# Patient Record
Sex: MALE | Race: WHITE | NOT HISPANIC OR LATINO | URBAN - METROPOLITAN AREA
[De-identification: names, ages, dates, MRNs, and addresses within clinical notes are randomized per-mention and may not be internally consistent; named-entity substitution may affect disease eponyms.]

---

## 2019-02-27 ENCOUNTER — HOSPITAL ENCOUNTER (OUTPATIENT)
Dept: MEDSURG UNIT | Facility: HOSPITAL | Age: 49
End: 2019-02-28
Attending: INTERNAL MEDICINE | Admitting: INTERNAL MEDICINE

## 2019-02-27 LAB
ABS NEUT (OLG): 8.96 X10(3)/MCL (ref 2.1–9.2)
ALBUMIN SERPL-MCNC: 3.9 GM/DL (ref 3.4–5)
ALBUMIN/GLOB SERPL: 0.9 RATIO (ref 1.1–2)
ALP SERPL-CCNC: 104 UNIT/L (ref 45–117)
ALT SERPL-CCNC: 97 UNIT/L (ref 12–78)
AMPHET UR QL SCN: NEGATIVE
APTT PPP: 27.1 SECOND(S) (ref 23.3–37)
AST SERPL-CCNC: 58 UNIT/L (ref 15–37)
BARBITURATE SCN PRESENT UR: NEGATIVE
BASOPHILS # BLD AUTO: 0.04 X10(3)/MCL
BASOPHILS NFR BLD AUTO: 0 %
BENZODIAZ UR QL SCN: NEGATIVE
BILIRUB SERPL-MCNC: 0.6 MG/DL (ref 0.2–1)
BILIRUBIN DIRECT+TOT PNL SERPL-MCNC: 0.2 MG/DL
BILIRUBIN DIRECT+TOT PNL SERPL-MCNC: 0.4 MG/DL
BUN SERPL-MCNC: 15 MG/DL (ref 7–18)
BUN SERPL-MCNC: 15 MG/DL (ref 7–18)
CALCIUM SERPL-MCNC: 8.9 MG/DL (ref 8.5–10.1)
CALCIUM SERPL-MCNC: 9 MG/DL (ref 8.5–10.1)
CANNABINOIDS UR QL SCN: NEGATIVE
CHLORIDE SERPL-SCNC: 100 MMOL/L (ref 98–107)
CHLORIDE SERPL-SCNC: 101 MMOL/L (ref 98–107)
CK MB SERPL-MCNC: 5.4 NG/ML (ref 1–3.6)
CK SERPL-CCNC: 180 UNIT/L (ref 39–308)
CO2 SERPL-SCNC: 28 MMOL/L (ref 21–32)
CO2 SERPL-SCNC: 31 MMOL/L (ref 21–32)
COCAINE UR QL SCN: NEGATIVE
CREAT SERPL-MCNC: 0.8 MG/DL (ref 0.6–1.3)
CREAT SERPL-MCNC: 0.8 MG/DL (ref 0.6–1.3)
CREAT/UREA NIT SERPL: 19
EOSINOPHIL # BLD AUTO: 0.07 X10(3)/MCL
EOSINOPHIL NFR BLD AUTO: 1 %
ERYTHROCYTE [DISTWIDTH] IN BLOOD BY AUTOMATED COUNT: 12.8 % (ref 11.5–14.5)
EST. AVERAGE GLUCOSE BLD GHB EST-MCNC: 180 MG/DL
GLOBULIN SER-MCNC: 4.5 GM/ML (ref 2.3–3.5)
GLUCOSE SERPL-MCNC: 220 MG/DL (ref 74–106)
GLUCOSE SERPL-MCNC: 246 MG/DL (ref 74–106)
HAV IGM SERPL QL IA: NONREACTIVE
HBA1C MFR BLD: 7.9 % (ref 4.2–6.3)
HBV CORE IGM SERPL QL IA: ABNORMAL
HBV SURFACE AG SERPL QL IA: NEGATIVE
HCT VFR BLD AUTO: 46.8 % (ref 40–51)
HCV AB SERPL QL IA: NONREACTIVE
HGB BLD-MCNC: 15.9 GM/DL (ref 13.5–17.5)
HIV 1+2 AB+HIV1 P24 AG SERPL QL IA: NONREACTIVE
IMM GRANULOCYTES # BLD AUTO: 0.07 10*3/UL
IMM GRANULOCYTES NFR BLD AUTO: 1 %
INR PPP: 0.97 (ref 0.9–1.2)
LYMPHOCYTES # BLD AUTO: 1.15 X10(3)/MCL
LYMPHOCYTES NFR BLD AUTO: 10 % (ref 13–40)
MAGNESIUM SERPL-MCNC: 2 MG/DL (ref 1.6–2.6)
MCH RBC QN AUTO: 31.7 PG (ref 26–34)
MCHC RBC AUTO-ENTMCNC: 34 GM/DL (ref 31–37)
MCV RBC AUTO: 93.2 FL (ref 80–100)
MONOCYTES # BLD AUTO: 0.79 X10(3)/MCL
MONOCYTES NFR BLD AUTO: 7 % (ref 4–12)
NEUTROPHILS # BLD AUTO: 8.96 X10(3)/MCL
NEUTROPHILS NFR BLD AUTO: 81 X10(3)/MCL
OPIATES UR QL SCN: NEGATIVE
PCP UR QL: NEGATIVE
PH UR STRIP.AUTO: 5 [PH] (ref 5–8)
PHOSPHATE SERPL-MCNC: 1.9 MG/DL (ref 2.5–4.9)
PHOSPHATE SERPL-MCNC: 2.7 MG/DL (ref 2.5–4.9)
PHOSPHATE SERPL-MCNC: 2.9 MG/DL (ref 2.5–4.9)
PLATELET # BLD AUTO: 245 X10(3)/MCL (ref 130–400)
PMV BLD AUTO: 10.8 FL (ref 7.4–10.4)
POC TROPONIN: 0.01 NG/ML (ref 0–0.08)
POTASSIUM SERPL-SCNC: 3.6 MMOL/L (ref 3.5–5.1)
POTASSIUM SERPL-SCNC: 3.8 MMOL/L (ref 3.5–5.1)
PROT SERPL-MCNC: 8.4 GM/DL (ref 6.4–8.2)
PROTHROMBIN TIME: 12.8 SECOND(S) (ref 11.9–14.4)
RBC # BLD AUTO: 5.02 X10(6)/MCL (ref 4.5–5.9)
SODIUM SERPL-SCNC: 136 MMOL/L (ref 136–145)
SODIUM SERPL-SCNC: 138 MMOL/L (ref 136–145)
TEMPERATURE, URINE (OHS): 25 DEGC (ref 20–25)
TROPONIN I SERPL-MCNC: 0.03 NG/ML (ref 0–0.05)
TSH SERPL-ACNC: 1.59 MIU/L (ref 0.36–3.74)
WBC # SPEC AUTO: 11.1 X10(3)/MCL (ref 4.5–11)

## 2019-02-28 LAB
ABS NEUT (OLG): 7.41 X10(3)/MCL (ref 2.1–9.2)
ALBUMIN SERPL-MCNC: 3.6 GM/DL (ref 3.4–5)
ALBUMIN/GLOB SERPL: 0.9 RATIO (ref 1.1–2)
ALP SERPL-CCNC: 89 UNIT/L (ref 45–117)
ALT SERPL-CCNC: 81 UNIT/L (ref 12–78)
AST SERPL-CCNC: 42 UNIT/L (ref 15–37)
BASOPHILS # BLD AUTO: 0.04 X10(3)/MCL
BASOPHILS NFR BLD AUTO: 0 %
BILIRUB SERPL-MCNC: 0.6 MG/DL (ref 0.2–1)
BILIRUBIN DIRECT+TOT PNL SERPL-MCNC: 0.2 MG/DL
BILIRUBIN DIRECT+TOT PNL SERPL-MCNC: 0.4 MG/DL
BUN SERPL-MCNC: 13 MG/DL (ref 7–18)
CALCIUM SERPL-MCNC: 8.7 MG/DL (ref 8.5–10.1)
CHLORIDE SERPL-SCNC: 104 MMOL/L (ref 98–107)
CHOLEST SERPL-MCNC: 258 MG/DL
CHOLEST/HDLC SERPL: 8.1 {RATIO} (ref 0–5)
CO2 SERPL-SCNC: 29 MMOL/L (ref 21–32)
CREAT SERPL-MCNC: 0.8 MG/DL (ref 0.6–1.3)
EOSINOPHIL # BLD AUTO: 0.13 10*3/UL
EOSINOPHIL NFR BLD AUTO: 1 %
ERYTHROCYTE [DISTWIDTH] IN BLOOD BY AUTOMATED COUNT: 12.7 % (ref 11.5–14.5)
GLOBULIN SER-MCNC: 3.9 GM/ML (ref 2.3–3.5)
GLUCOSE SERPL-MCNC: 159 MG/DL (ref 74–106)
HCT VFR BLD AUTO: 43.7 % (ref 40–51)
HDLC SERPL-MCNC: 32 MG/DL
HGB BLD-MCNC: 14.4 GM/DL (ref 13.5–17.5)
IMM GRANULOCYTES # BLD AUTO: 0.07 10*3/UL
IMM GRANULOCYTES NFR BLD AUTO: 1 %
LDLC SERPL CALC-MCNC: ABNORMAL MG/DL (ref 0–130)
LYMPHOCYTES # BLD AUTO: 2.17 X10(3)/MCL
LYMPHOCYTES NFR BLD AUTO: 20 % (ref 13–40)
MAGNESIUM SERPL-MCNC: 2.1 MG/DL (ref 1.6–2.6)
MCH RBC QN AUTO: 30.9 PG (ref 26–34)
MCHC RBC AUTO-ENTMCNC: 33 GM/DL (ref 31–37)
MCV RBC AUTO: 93.8 FL (ref 80–100)
MONOCYTES # BLD AUTO: 1.25 X10(3)/MCL
MONOCYTES NFR BLD AUTO: 11 % (ref 4–12)
NEUTROPHILS # BLD AUTO: 7.41 X10(3)/MCL
NEUTROPHILS NFR BLD AUTO: 67 X10(3)/MCL
PHOSPHATE SERPL-MCNC: 3.7 MG/DL (ref 2.5–4.9)
PLATELET # BLD AUTO: 255 X10(3)/MCL (ref 130–400)
PMV BLD AUTO: 11 FL (ref 7.4–10.4)
POTASSIUM SERPL-SCNC: 4 MMOL/L (ref 3.5–5.1)
PROT SERPL-MCNC: 7.5 GM/DL (ref 6.4–8.2)
RBC # BLD AUTO: 4.66 X10(6)/MCL (ref 4.5–5.9)
SODIUM SERPL-SCNC: 141 MMOL/L (ref 136–145)
TRIGL SERPL-MCNC: 522 MG/DL
TROPONIN I SERPL-MCNC: 0.02 NG/ML (ref 0–0.05)
VLDLC SERPL CALC-MCNC: ABNORMAL MG/DL
WBC # SPEC AUTO: 11.1 X10(3)/MCL (ref 4.5–11)

## 2019-06-04 ENCOUNTER — HISTORICAL (OUTPATIENT)
Dept: ADMINISTRATIVE | Facility: HOSPITAL | Age: 49
End: 2019-06-04

## 2019-06-04 LAB
ABS NEUT (OLG): 6.57 X10(3)/MCL (ref 2.1–9.2)
APTT PPP: 26.1 SECOND(S) (ref 23.3–37)
BASOPHILS # BLD AUTO: 0.05 X10(3)/MCL
BASOPHILS NFR BLD AUTO: 0 %
BUN SERPL-MCNC: 13 MG/DL (ref 7–18)
CALCIUM SERPL-MCNC: 8.7 MG/DL (ref 8.5–10.1)
CHLORIDE SERPL-SCNC: 102 MMOL/L (ref 98–107)
CO2 SERPL-SCNC: 31 MMOL/L (ref 21–32)
CREAT SERPL-MCNC: 0.8 MG/DL (ref 0.6–1.3)
CREAT/UREA NIT SERPL: 16
EOSINOPHIL # BLD AUTO: 0.36 X10(3)/MCL
EOSINOPHIL NFR BLD AUTO: 4 %
ERYTHROCYTE [DISTWIDTH] IN BLOOD BY AUTOMATED COUNT: 12.5 % (ref 11.5–14.5)
GLUCOSE SERPL-MCNC: 241 MG/DL (ref 74–106)
HCT VFR BLD AUTO: 42.2 % (ref 40–51)
HGB BLD-MCNC: 15 GM/DL (ref 13.5–17.5)
IMM GRANULOCYTES # BLD AUTO: 0.06 10*3/UL
IMM GRANULOCYTES NFR BLD AUTO: 1 %
INR PPP: 0.95 (ref 0.9–1.2)
LYMPHOCYTES # BLD AUTO: 1.7 X10(3)/MCL
LYMPHOCYTES NFR BLD AUTO: 18 % (ref 13–40)
MCH RBC QN AUTO: 33.2 PG (ref 26–34)
MCHC RBC AUTO-ENTMCNC: 35.5 GM/DL (ref 31–37)
MCV RBC AUTO: 93.4 FL (ref 80–100)
MONOCYTES # BLD AUTO: 0.84 X10(3)/MCL
MONOCYTES NFR BLD AUTO: 9 % (ref 4–12)
NEUTROPHILS # BLD AUTO: 6.57 X10(3)/MCL
NEUTROPHILS NFR BLD AUTO: 69 X10(3)/MCL
PLATELET # BLD AUTO: 227 X10(3)/MCL (ref 130–400)
PMV BLD AUTO: 10.7 FL (ref 7.4–10.4)
POTASSIUM SERPL-SCNC: 3.7 MMOL/L (ref 3.5–5.1)
PROTHROMBIN TIME: 12.6 SECOND(S) (ref 11.9–14.4)
RBC # BLD AUTO: 4.52 X10(6)/MCL (ref 4.5–5.9)
SODIUM SERPL-SCNC: 136 MMOL/L (ref 136–145)
WBC # SPEC AUTO: 9.6 X10(3)/MCL (ref 4.5–11)

## 2019-07-03 ENCOUNTER — HISTORICAL (OUTPATIENT)
Dept: CARDIOLOGY | Facility: HOSPITAL | Age: 49
End: 2019-07-03

## 2019-09-04 ENCOUNTER — HISTORICAL (OUTPATIENT)
Dept: ADMINISTRATIVE | Facility: HOSPITAL | Age: 49
End: 2019-09-04

## 2019-09-04 LAB
APTT PPP: 28.1 SECOND(S) (ref 23.3–37)
BUN SERPL-MCNC: 18 MG/DL (ref 7–18)
CALCIUM SERPL-MCNC: 8.7 MG/DL (ref 8.5–10.1)
CHLORIDE SERPL-SCNC: 98 MMOL/L (ref 98–107)
CO2 SERPL-SCNC: 27 MMOL/L (ref 21–32)
CREAT SERPL-MCNC: 0.8 MG/DL (ref 0.6–1.3)
CREAT/UREA NIT SERPL: 22
ERYTHROCYTE [DISTWIDTH] IN BLOOD BY AUTOMATED COUNT: 12.3 % (ref 11.5–14.5)
GLUCOSE SERPL-MCNC: 406 MG/DL (ref 74–106)
HCT VFR BLD AUTO: 45.5 % (ref 40–51)
HGB BLD-MCNC: 15.2 GM/DL (ref 13.5–17.5)
INR PPP: 0.94 (ref 0.9–1.2)
MCH RBC QN AUTO: 32 PG (ref 26–34)
MCHC RBC AUTO-ENTMCNC: 33.4 GM/DL (ref 31–37)
MCV RBC AUTO: 95.8 FL (ref 80–100)
PLATELET # BLD AUTO: 223 X10(3)/MCL (ref 130–400)
PMV BLD AUTO: 10.7 FL (ref 7.4–10.4)
POTASSIUM SERPL-SCNC: 4.1 MMOL/L (ref 3.5–5.1)
PROTHROMBIN TIME: 12.5 SECOND(S) (ref 11.9–14.4)
RBC # BLD AUTO: 4.75 X10(6)/MCL (ref 4.5–5.9)
SODIUM SERPL-SCNC: 134 MMOL/L (ref 136–145)
WBC # SPEC AUTO: 9.8 X10(3)/MCL (ref 4.5–11)

## 2019-09-25 ENCOUNTER — HOSPITAL ENCOUNTER (OUTPATIENT)
Dept: INTENSIVE CARE | Facility: HOSPITAL | Age: 49
End: 2019-09-26
Attending: INTERNAL MEDICINE | Admitting: PEDIATRICS

## 2019-09-25 LAB
ABS NEUT (OLG): 5.26 X10(3)/MCL (ref 2.1–9.2)
APTT PPP: 26.8 SECOND(S) (ref 23.3–37)
BASOPHILS # BLD AUTO: 0 X10(3)/MCL (ref 0–0.2)
BASOPHILS NFR BLD AUTO: 0 %
BUN SERPL-MCNC: 15 MG/DL (ref 7–18)
CALCIUM SERPL-MCNC: 8.4 MG/DL (ref 8.5–10.1)
CHLORIDE SERPL-SCNC: 101 MMOL/L (ref 98–107)
CHOLEST SERPL-MCNC: 202 MG/DL
CHOLEST/HDLC SERPL: 6.5 {RATIO} (ref 0–5)
CO2 SERPL-SCNC: 27 MMOL/L (ref 21–32)
CREAT SERPL-MCNC: 0.9 MG/DL (ref 0.6–1.3)
CREAT/UREA NIT SERPL: 17
EOSINOPHIL # BLD AUTO: 0.4 X10(3)/MCL (ref 0–0.9)
EOSINOPHIL NFR BLD AUTO: 4 %
ERYTHROCYTE [DISTWIDTH] IN BLOOD BY AUTOMATED COUNT: 12.5 % (ref 11.5–14.5)
GLUCOSE SERPL-MCNC: 303 MG/DL (ref 74–106)
HCT VFR BLD AUTO: 42 % (ref 40–51)
HDLC SERPL-MCNC: 31 MG/DL (ref 40–59)
HGB BLD-MCNC: 14.3 GM/DL (ref 13.5–17.5)
IMM GRANULOCYTES # BLD AUTO: 0.06 10*3/UL
IMM GRANULOCYTES NFR BLD AUTO: 1 %
INR PPP: 0.98 (ref 0.9–1.2)
LDLC SERPL CALC-MCNC: ABNORMAL MG/DL
LYMPHOCYTES # BLD AUTO: 1.6 X10(3)/MCL (ref 0.6–4.6)
LYMPHOCYTES NFR BLD AUTO: 19 %
MCH RBC QN AUTO: 32.4 PG (ref 26–34)
MCHC RBC AUTO-ENTMCNC: 34 GM/DL (ref 31–37)
MCV RBC AUTO: 95 FL (ref 80–100)
MONOCYTES # BLD AUTO: 0.9 X10(3)/MCL (ref 0.1–1.3)
MONOCYTES NFR BLD AUTO: 11 %
NEUTROPHILS # BLD AUTO: 5.26 X10(3)/MCL (ref 2.1–9.2)
NEUTROPHILS NFR BLD AUTO: 64 %
PLATELET # BLD AUTO: 208 X10(3)/MCL (ref 130–400)
PMV BLD AUTO: 10.6 FL (ref 7.4–10.4)
POTASSIUM SERPL-SCNC: 3.7 MMOL/L (ref 3.5–5.1)
PROTHROMBIN TIME: 12.9 SECOND(S) (ref 11.9–14.4)
RBC # BLD AUTO: 4.42 X10(6)/MCL (ref 4.5–5.9)
SODIUM SERPL-SCNC: 136 MMOL/L (ref 136–145)
TRIGL SERPL-MCNC: 898 MG/DL
VLDLC SERPL CALC-MCNC: ABNORMAL MG/DL
WBC # SPEC AUTO: 8.2 X10(3)/MCL (ref 4.5–11)

## 2019-09-26 LAB
ABS NEUT (OLG): 5.3 X10(3)/MCL (ref 2.1–9.2)
BASOPHILS # BLD AUTO: 0 X10(3)/MCL (ref 0–0.2)
BASOPHILS NFR BLD AUTO: 0 %
BUN SERPL-MCNC: 12 MG/DL (ref 7–18)
CALCIUM SERPL-MCNC: 8.3 MG/DL (ref 8.5–10.1)
CHLORIDE SERPL-SCNC: 102 MMOL/L (ref 98–107)
CO2 SERPL-SCNC: 29 MMOL/L (ref 21–32)
CREAT SERPL-MCNC: 0.7 MG/DL (ref 0.6–1.3)
CREAT/UREA NIT SERPL: 17
EOSINOPHIL # BLD AUTO: 0.3 X10(3)/MCL (ref 0–0.9)
EOSINOPHIL NFR BLD AUTO: 4 %
ERYTHROCYTE [DISTWIDTH] IN BLOOD BY AUTOMATED COUNT: 12.5 % (ref 11.5–14.5)
GLUCOSE SERPL-MCNC: 269 MG/DL (ref 74–106)
HCT VFR BLD AUTO: 42 % (ref 40–51)
HGB BLD-MCNC: 14.1 GM/DL (ref 13.5–17.5)
IMM GRANULOCYTES # BLD AUTO: 0.04 10*3/UL
IMM GRANULOCYTES NFR BLD AUTO: 0 %
LYMPHOCYTES # BLD AUTO: 2 X10(3)/MCL (ref 0.6–4.6)
LYMPHOCYTES NFR BLD AUTO: 23 %
MCH RBC QN AUTO: 32.1 PG (ref 26–34)
MCHC RBC AUTO-ENTMCNC: 33.6 GM/DL (ref 31–37)
MCV RBC AUTO: 95.7 FL (ref 80–100)
MONOCYTES # BLD AUTO: 0.9 X10(3)/MCL (ref 0.1–1.3)
MONOCYTES NFR BLD AUTO: 11 %
NEUTROPHILS # BLD AUTO: 5.3 X10(3)/MCL (ref 2.1–9.2)
NEUTROPHILS NFR BLD AUTO: 62 %
PLATELET # BLD AUTO: 213 X10(3)/MCL (ref 130–400)
PMV BLD AUTO: 10.5 FL (ref 7.4–10.4)
POTASSIUM SERPL-SCNC: 3.9 MMOL/L (ref 3.5–5.1)
RBC # BLD AUTO: 4.39 X10(6)/MCL (ref 4.5–5.9)
SODIUM SERPL-SCNC: 138 MMOL/L (ref 136–145)
TROPONIN I SERPL-MCNC: 1.42 NG/ML (ref 0–0.05)
WBC # SPEC AUTO: 8.6 X10(3)/MCL (ref 4.5–11)

## 2019-10-31 ENCOUNTER — HISTORICAL (OUTPATIENT)
Dept: RADIOLOGY | Facility: HOSPITAL | Age: 49
End: 2019-10-31

## 2020-01-17 ENCOUNTER — HISTORICAL (OUTPATIENT)
Dept: RADIOLOGY | Facility: HOSPITAL | Age: 50
End: 2020-01-17

## 2020-01-24 ENCOUNTER — HISTORICAL (OUTPATIENT)
Dept: ADMINISTRATIVE | Facility: HOSPITAL | Age: 50
End: 2020-01-24

## 2020-01-24 LAB
ABS NEUT (OLG): 4.96 X10(3)/MCL (ref 2.1–9.2)
ALBUMIN SERPL-MCNC: 3.8 GM/DL (ref 3.4–5)
ALBUMIN/GLOB SERPL: 0.9 RATIO (ref 1.1–2)
ALP SERPL-CCNC: 101 UNIT/L (ref 45–117)
ALT SERPL-CCNC: 68 UNIT/L (ref 12–78)
APPEARANCE, UA: CLEAR
AST SERPL-CCNC: 27 UNIT/L (ref 15–37)
BACTERIA #/AREA URNS AUTO: ABNORMAL /HPF
BASOPHILS # BLD AUTO: 0 X10(3)/MCL (ref 0–0.2)
BASOPHILS NFR BLD AUTO: 0 %
BILIRUB SERPL-MCNC: 0.7 MG/DL (ref 0.2–1)
BILIRUB UR QL STRIP: NEGATIVE
BILIRUBIN DIRECT+TOT PNL SERPL-MCNC: 0.2 MG/DL (ref 0–0.2)
BILIRUBIN DIRECT+TOT PNL SERPL-MCNC: 0.5 MG/DL
BUN SERPL-MCNC: 17 MG/DL (ref 7–18)
CALCIUM SERPL-MCNC: 9.1 MG/DL (ref 8.5–10.1)
CHLORIDE SERPL-SCNC: 98 MMOL/L (ref 98–107)
CHOLEST SERPL-MCNC: 188 MG/DL
CHOLEST/HDLC SERPL: 5.4 {RATIO} (ref 0–5)
CO2 SERPL-SCNC: 32 MMOL/L (ref 21–32)
COLOR UR: ABNORMAL
CREAT SERPL-MCNC: 0.8 MG/DL (ref 0.6–1.3)
CREAT UR-MCNC: 27 MG/DL
EOSINOPHIL # BLD AUTO: 0.4 X10(3)/MCL (ref 0–0.9)
EOSINOPHIL NFR BLD AUTO: 5 %
ERYTHROCYTE [DISTWIDTH] IN BLOOD BY AUTOMATED COUNT: 12.7 % (ref 11.5–14.5)
GLOBULIN SER-MCNC: 4.1 GM/ML (ref 2.3–3.5)
GLUCOSE (UA): 200 MG/DL
GLUCOSE SERPL-MCNC: 272 MG/DL (ref 74–106)
HCT VFR BLD AUTO: 45.9 % (ref 40–51)
HDLC SERPL-MCNC: 35 MG/DL (ref 40–59)
HGB BLD-MCNC: 15.4 GM/DL (ref 13.5–17.5)
HGB UR QL STRIP: NEGATIVE
HYALINE CASTS #/AREA URNS LPF: ABNORMAL /LPF
IMM GRANULOCYTES # BLD AUTO: 0.04 10*3/UL
IMM GRANULOCYTES NFR BLD AUTO: 0 %
KETONES UR QL STRIP: NEGATIVE
LDLC SERPL CALC-MCNC: ABNORMAL MG/DL
LEUKOCYTE ESTERASE UR QL STRIP: 75 LEU/UL
LYMPHOCYTES # BLD AUTO: 1.5 X10(3)/MCL (ref 0.6–4.6)
LYMPHOCYTES NFR BLD AUTO: 20 %
MCH RBC QN AUTO: 31.2 PG (ref 26–34)
MCHC RBC AUTO-ENTMCNC: 33.6 GM/DL (ref 31–37)
MCV RBC AUTO: 93.1 FL (ref 80–100)
MICROALBUMIN UR-MCNC: 6.1 MG/L (ref 0–19)
MICROALBUMIN/CREAT RATIO PNL UR: 22.6 MCG/MG CR (ref 0–29)
MONOCYTES # BLD AUTO: 0.8 X10(3)/MCL (ref 0.1–1.3)
MONOCYTES NFR BLD AUTO: 11 %
NEUTROPHILS # BLD AUTO: 4.96 X10(3)/MCL (ref 2.1–9.2)
NEUTROPHILS NFR BLD AUTO: 64 %
NITRITE UR QL STRIP: NEGATIVE
PH UR STRIP: 5 [PH] (ref 4.5–8)
PLATELET # BLD AUTO: 223 X10(3)/MCL (ref 130–400)
PMV BLD AUTO: 10.4 FL (ref 7.4–10.4)
POTASSIUM SERPL-SCNC: 3.9 MMOL/L (ref 3.5–5.1)
PROT SERPL-MCNC: 7.9 GM/DL (ref 6.4–8.2)
PROT UR QL STRIP: NEGATIVE
PSA SERPL-MCNC: 0.1 NG/ML
RBC # BLD AUTO: 4.93 X10(6)/MCL (ref 4.5–5.9)
RBC #/AREA URNS AUTO: ABNORMAL /HPF
SODIUM SERPL-SCNC: 135 MMOL/L (ref 136–145)
SP GR UR STRIP: 1.01 (ref 1–1.03)
SQUAMOUS #/AREA URNS LPF: ABNORMAL /LPF
TRIGL SERPL-MCNC: 627 MG/DL
TSH SERPL-ACNC: 1.19 MIU/L (ref 0.36–3.74)
UROBILINOGEN UR STRIP-ACNC: NORMAL
VLDLC SERPL CALC-MCNC: ABNORMAL MG/DL
WBC # SPEC AUTO: 7.8 X10(3)/MCL (ref 4.5–11)
WBC #/AREA URNS AUTO: ABNORMAL /HPF

## 2020-03-12 ENCOUNTER — HISTORICAL (OUTPATIENT)
Dept: INTERNAL MEDICINE | Facility: CLINIC | Age: 50
End: 2020-03-12

## 2020-03-12 LAB
EST. AVERAGE GLUCOSE BLD GHB EST-MCNC: 163 MG/DL
HBA1C MFR BLD: 7.3 % (ref 4.2–6.3)

## 2020-05-19 ENCOUNTER — HISTORICAL (OUTPATIENT)
Dept: INTERNAL MEDICINE | Facility: CLINIC | Age: 50
End: 2020-05-19

## 2020-05-19 LAB
APPEARANCE, UA: CLEAR
BACTERIA #/AREA URNS AUTO: ABNORMAL /HPF
BILIRUB UR QL STRIP: NEGATIVE
BUN SERPL-MCNC: 19 MG/DL (ref 7–18)
CALCIUM SERPL-MCNC: 9.6 MG/DL (ref 8.5–10.1)
CHLORIDE SERPL-SCNC: 104 MMOL/L (ref 98–107)
CHOLEST SERPL-MCNC: 163 MG/DL
CHOLEST/HDLC SERPL: 3.7 {RATIO} (ref 0–5)
CO2 SERPL-SCNC: 31 MMOL/L (ref 21–32)
COLOR UR: NORMAL
CREAT SERPL-MCNC: 1.2 MG/DL (ref 0.6–1.3)
CREAT/UREA NIT SERPL: 16
EST. AVERAGE GLUCOSE BLD GHB EST-MCNC: 157 MG/DL
GLUCOSE (UA): NEGATIVE
GLUCOSE SERPL-MCNC: 186 MG/DL (ref 74–106)
HBA1C MFR BLD: 7.1 % (ref 4.2–6.3)
HDLC SERPL-MCNC: 44 MG/DL (ref 40–59)
HGB UR QL STRIP: NEGATIVE
HYALINE CASTS #/AREA URNS LPF: ABNORMAL /LPF
KETONES UR QL STRIP: NEGATIVE
LDLC SERPL CALC-MCNC: 88 MG/DL
LEUKOCYTE ESTERASE UR QL STRIP: NEGATIVE
NITRITE UR QL STRIP: NEGATIVE
PH UR STRIP: 5 [PH] (ref 4.5–8)
POTASSIUM SERPL-SCNC: 3.8 MMOL/L (ref 3.5–5.1)
PROT UR QL STRIP: NEGATIVE
RBC #/AREA URNS AUTO: ABNORMAL /HPF
SODIUM SERPL-SCNC: 140 MMOL/L (ref 136–145)
SP GR UR STRIP: 1.01 (ref 1–1.03)
SQUAMOUS #/AREA URNS LPF: ABNORMAL /LPF
TRIGL SERPL-MCNC: 154 MG/DL
UROBILINOGEN UR STRIP-ACNC: NORMAL
VLDLC SERPL CALC-MCNC: 31 MG/DL
WBC #/AREA URNS AUTO: ABNORMAL /HPF

## 2020-08-24 ENCOUNTER — HISTORICAL (OUTPATIENT)
Dept: ADMINISTRATIVE | Facility: HOSPITAL | Age: 50
End: 2020-08-24

## 2020-08-24 LAB
ABS NEUT (OLG): 5.86 X10(3)/MCL (ref 2.1–9.2)
ALBUMIN SERPL-MCNC: 3.8 GM/DL (ref 3.4–5)
ALBUMIN/GLOB SERPL: 1 RATIO (ref 1.1–2)
ALP SERPL-CCNC: 68 UNIT/L (ref 45–117)
ALT SERPL-CCNC: 45 UNIT/L (ref 12–78)
AST SERPL-CCNC: 27 UNIT/L (ref 15–37)
BASOPHILS # BLD AUTO: 0 X10(3)/MCL (ref 0–0.2)
BASOPHILS NFR BLD AUTO: 1 %
BILIRUB SERPL-MCNC: 0.3 MG/DL (ref 0.2–1)
BILIRUBIN DIRECT+TOT PNL SERPL-MCNC: 0.1 MG/DL (ref 0–0.2)
BILIRUBIN DIRECT+TOT PNL SERPL-MCNC: 0.2 MG/DL
BUN SERPL-MCNC: 14 MG/DL (ref 7–18)
CALCIUM SERPL-MCNC: 9.2 MG/DL (ref 8.5–10.1)
CHLORIDE SERPL-SCNC: 104 MMOL/L (ref 98–107)
CO2 SERPL-SCNC: 26 MMOL/L (ref 21–32)
CREAT SERPL-MCNC: 1.1 MG/DL (ref 0.6–1.3)
EOSINOPHIL # BLD AUTO: 0.3 X10(3)/MCL (ref 0–0.9)
EOSINOPHIL NFR BLD AUTO: 4 %
ERYTHROCYTE [DISTWIDTH] IN BLOOD BY AUTOMATED COUNT: 12.2 % (ref 11.5–14.5)
GLOBULIN SER-MCNC: 3.9 GM/ML (ref 2.3–3.5)
GLUCOSE SERPL-MCNC: 386 MG/DL (ref 74–106)
HCT VFR BLD AUTO: 39.4 % (ref 40–51)
HGB BLD-MCNC: 13.5 GM/DL (ref 13.5–17.5)
IMM GRANULOCYTES # BLD AUTO: 0.06 10*3/UL
IMM GRANULOCYTES NFR BLD AUTO: 1 %
LYMPHOCYTES # BLD AUTO: 1.4 X10(3)/MCL (ref 0.6–4.6)
LYMPHOCYTES NFR BLD AUTO: 16 %
MCH RBC QN AUTO: 32 PG (ref 26–34)
MCHC RBC AUTO-ENTMCNC: 34.3 GM/DL (ref 31–37)
MCV RBC AUTO: 93.4 FL (ref 80–100)
MONOCYTES # BLD AUTO: 0.7 X10(3)/MCL (ref 0.1–1.3)
MONOCYTES NFR BLD AUTO: 8 %
NEUTROPHILS # BLD AUTO: 5.86 X10(3)/MCL (ref 2.1–9.2)
NEUTROPHILS NFR BLD AUTO: 70 %
PLATELET # BLD AUTO: 274 X10(3)/MCL (ref 130–400)
PMV BLD AUTO: 10.7 FL (ref 7.4–10.4)
POTASSIUM SERPL-SCNC: 4.1 MMOL/L (ref 3.5–5.1)
PROT SERPL-MCNC: 7.7 GM/DL (ref 6.4–8.2)
RBC # BLD AUTO: 4.22 X10(6)/MCL (ref 4.5–5.9)
SODIUM SERPL-SCNC: 138 MMOL/L (ref 136–145)
WBC # SPEC AUTO: 8.3 X10(3)/MCL (ref 4.5–11)

## 2020-11-17 ENCOUNTER — HISTORICAL (OUTPATIENT)
Dept: INTERNAL MEDICINE | Facility: CLINIC | Age: 50
End: 2020-11-17

## 2020-11-17 LAB
ALBUMIN SERPL-MCNC: 3.9 GM/DL (ref 3.5–5)
ALBUMIN/GLOB SERPL: 1.2 RATIO (ref 1.1–2)
ALP SERPL-CCNC: 51 UNIT/L (ref 40–150)
ALT SERPL-CCNC: 32 UNIT/L (ref 0–55)
APPEARANCE, UA: CLEAR
AST SERPL-CCNC: 23 UNIT/L (ref 5–34)
BACTERIA #/AREA URNS AUTO: ABNORMAL /HPF
BILIRUB SERPL-MCNC: 0.4 MG/DL
BILIRUB UR QL STRIP: NEGATIVE
BILIRUBIN DIRECT+TOT PNL SERPL-MCNC: 0.1 MG/DL (ref 0–0.8)
BILIRUBIN DIRECT+TOT PNL SERPL-MCNC: 0.3 MG/DL (ref 0–0.5)
BUN SERPL-MCNC: 15 MG/DL (ref 8.4–25.7)
CALCIUM SERPL-MCNC: 8.9 MG/DL (ref 8.4–10.2)
CHLORIDE SERPL-SCNC: 106 MMOL/L (ref 98–107)
CHOLEST SERPL-MCNC: 151 MG/DL
CHOLEST/HDLC SERPL: 4 {RATIO} (ref 0–5)
CO2 SERPL-SCNC: 26 MMOL/L (ref 22–29)
COLOR UR: NORMAL
CREAT SERPL-MCNC: 0.75 MG/DL (ref 0.73–1.18)
EST. AVERAGE GLUCOSE BLD GHB EST-MCNC: 145.6 MG/DL
GLOBULIN SER-MCNC: 3.3 GM/DL (ref 2.4–3.5)
GLUCOSE (UA): NEGATIVE
GLUCOSE SERPL-MCNC: 146 MG/DL (ref 74–100)
HBA1C MFR BLD: 6.7 %
HDLC SERPL-MCNC: 36 MG/DL (ref 35–60)
HGB UR QL STRIP: NEGATIVE
HYALINE CASTS #/AREA URNS LPF: ABNORMAL /LPF
KETONES UR QL STRIP: NEGATIVE
LDLC SERPL CALC-MCNC: 89 MG/DL (ref 50–140)
LEUKOCYTE ESTERASE UR QL STRIP: NEGATIVE
NITRITE UR QL STRIP: NEGATIVE
PH UR STRIP: 5.5 [PH] (ref 4.5–8)
POTASSIUM SERPL-SCNC: 3.8 MMOL/L (ref 3.5–5.1)
PROT SERPL-MCNC: 7.2 GM/DL (ref 6.4–8.3)
PROT UR QL STRIP: NEGATIVE
RBC #/AREA URNS AUTO: ABNORMAL /HPF
SODIUM SERPL-SCNC: 138 MMOL/L (ref 136–145)
SP GR UR STRIP: 1.02 (ref 1–1.03)
SQUAMOUS #/AREA URNS LPF: ABNORMAL /LPF
TRIGL SERPL-MCNC: 131 MG/DL (ref 34–140)
UROBILINOGEN UR STRIP-ACNC: NORMAL
VLDLC SERPL CALC-MCNC: 26 MG/DL
WBC #/AREA URNS AUTO: ABNORMAL /HPF

## 2020-12-07 ENCOUNTER — HISTORICAL (OUTPATIENT)
Dept: RADIOLOGY | Facility: HOSPITAL | Age: 50
End: 2020-12-07

## 2020-12-07 LAB
BUN SERPL-MCNC: 16 MG/DL (ref 8.4–25.7)
CALCIUM SERPL-MCNC: 9.3 MG/DL (ref 8.4–10.2)
CHLORIDE SERPL-SCNC: 107 MMOL/L (ref 98–107)
CO2 SERPL-SCNC: 25 MMOL/L (ref 22–29)
CREAT SERPL-MCNC: 0.9 MG/DL (ref 0.73–1.18)
CREAT/UREA NIT SERPL: 18
EST. AVERAGE GLUCOSE BLD GHB EST-MCNC: 137 MG/DL
GLUCOSE SERPL-MCNC: 153 MG/DL (ref 74–100)
HBA1C MFR BLD: 6.4 %
POTASSIUM SERPL-SCNC: 4.1 MMOL/L (ref 3.5–5.1)
SODIUM SERPL-SCNC: 140 MMOL/L (ref 136–145)

## 2021-02-04 ENCOUNTER — HISTORICAL (OUTPATIENT)
Dept: CARDIOLOGY | Facility: CLINIC | Age: 51
End: 2021-02-04

## 2021-02-04 LAB
BUN SERPL-MCNC: 15 MG/DL (ref 8.4–25.7)
CALCIUM SERPL-MCNC: 9 MG/DL (ref 8.4–10.2)
CHLORIDE SERPL-SCNC: 105 MMOL/L (ref 98–107)
CO2 SERPL-SCNC: 25 MMOL/L (ref 22–29)
CREAT SERPL-MCNC: 0.84 MG/DL (ref 0.73–1.18)
CREAT/UREA NIT SERPL: 18
GLUCOSE SERPL-MCNC: 188 MG/DL (ref 74–100)
POTASSIUM SERPL-SCNC: 4 MMOL/L (ref 3.5–5.1)
SODIUM SERPL-SCNC: 139 MMOL/L (ref 136–145)

## 2021-03-09 ENCOUNTER — HISTORICAL (OUTPATIENT)
Dept: INTERNAL MEDICINE | Facility: CLINIC | Age: 51
End: 2021-03-09

## 2021-03-11 ENCOUNTER — HISTORICAL (OUTPATIENT)
Dept: CARDIOLOGY | Facility: CLINIC | Age: 51
End: 2021-03-11

## 2021-03-11 LAB
ABS NEUT (OLG): 4.85 X10(3)/MCL (ref 2.1–9.2)
ALBUMIN SERPL-MCNC: 3.9 GM/DL (ref 3.5–5)
ALBUMIN/GLOB SERPL: 1.2 RATIO (ref 1.1–2)
ALP SERPL-CCNC: 52 UNIT/L (ref 40–150)
ALT SERPL-CCNC: 35 UNIT/L (ref 0–55)
AST SERPL-CCNC: 24 UNIT/L (ref 5–34)
BASOPHILS # BLD AUTO: 0 X10(3)/MCL (ref 0–0.2)
BASOPHILS NFR BLD AUTO: 0 %
BILIRUB SERPL-MCNC: 0.7 MG/DL
BILIRUBIN DIRECT+TOT PNL SERPL-MCNC: 0.3 MG/DL (ref 0–0.5)
BILIRUBIN DIRECT+TOT PNL SERPL-MCNC: 0.4 MG/DL (ref 0–0.8)
BUN SERPL-MCNC: 17 MG/DL (ref 8.4–25.7)
CALCIUM SERPL-MCNC: 9.5 MG/DL (ref 8.4–10.2)
CHLORIDE SERPL-SCNC: 102 MMOL/L (ref 98–107)
CHOLEST SERPL-MCNC: 145 MG/DL
CHOLEST/HDLC SERPL: 4 {RATIO} (ref 0–5)
CO2 SERPL-SCNC: 28 MMOL/L (ref 22–29)
CREAT SERPL-MCNC: 0.89 MG/DL (ref 0.73–1.18)
CREAT UR-MCNC: 102.6 MG/DL (ref 58–161)
EOSINOPHIL # BLD AUTO: 0.3 X10(3)/MCL (ref 0–0.9)
EOSINOPHIL NFR BLD AUTO: 5 %
ERYTHROCYTE [DISTWIDTH] IN BLOOD BY AUTOMATED COUNT: 12.6 % (ref 11.5–14.5)
EST. AVERAGE GLUCOSE BLD GHB EST-MCNC: 151.3 MG/DL
GLOBULIN SER-MCNC: 3.2 GM/DL (ref 2.4–3.5)
GLUCOSE SERPL-MCNC: 191 MG/DL (ref 74–100)
HBA1C MFR BLD: 6.9 %
HCT VFR BLD AUTO: 39.3 % (ref 40–51)
HDLC SERPL-MCNC: 39 MG/DL (ref 35–60)
HGB BLD-MCNC: 13.2 GM/DL (ref 13.5–17.5)
IMM GRANULOCYTES # BLD AUTO: 0.03 10*3/UL
IMM GRANULOCYTES NFR BLD AUTO: 0 %
LDLC SERPL CALC-MCNC: 81 MG/DL (ref 50–140)
LYMPHOCYTES # BLD AUTO: 1.3 X10(3)/MCL (ref 0.6–4.6)
LYMPHOCYTES NFR BLD AUTO: 17 %
MCH RBC QN AUTO: 31.2 PG (ref 26–34)
MCHC RBC AUTO-ENTMCNC: 33.6 GM/DL (ref 31–37)
MCV RBC AUTO: 92.9 FL (ref 80–100)
MICROALBUMIN UR-MCNC: 6.7 MG/L
MICROALBUMIN/CREAT RATIO PNL UR: 6.5 MG/GM CR (ref 0–30)
MONOCYTES # BLD AUTO: 0.8 X10(3)/MCL (ref 0.1–1.3)
MONOCYTES NFR BLD AUTO: 11 %
NEUTROPHILS # BLD AUTO: 4.85 X10(3)/MCL (ref 2.1–9.2)
NEUTROPHILS NFR BLD AUTO: 66 %
PLATELET # BLD AUTO: 286 X10(3)/MCL (ref 130–400)
PMV BLD AUTO: 9.7 FL (ref 7.4–10.4)
POTASSIUM SERPL-SCNC: 4.4 MMOL/L (ref 3.5–5.1)
PROT SERPL-MCNC: 7.1 GM/DL (ref 6.4–8.3)
PSA SERPL-MCNC: 0.13 NG/ML
RBC # BLD AUTO: 4.23 X10(6)/MCL (ref 4.5–5.9)
SODIUM SERPL-SCNC: 136 MMOL/L (ref 136–145)
TRIGL SERPL-MCNC: 125 MG/DL (ref 34–140)
TSH SERPL-ACNC: 0.87 UIU/ML (ref 0.35–4.94)
VLDLC SERPL CALC-MCNC: 25 MG/DL
WBC # SPEC AUTO: 7.3 X10(3)/MCL (ref 4.5–11)

## 2021-06-15 ENCOUNTER — HISTORICAL (OUTPATIENT)
Dept: INTERNAL MEDICINE | Facility: CLINIC | Age: 51
End: 2021-06-15

## 2021-06-15 LAB
EST. AVERAGE GLUCOSE BLD GHB EST-MCNC: 168.6 MG/DL
HBA1C MFR BLD: 7.5 %

## 2021-09-23 ENCOUNTER — HISTORICAL (OUTPATIENT)
Dept: INTERNAL MEDICINE | Facility: CLINIC | Age: 51
End: 2021-09-23

## 2021-09-23 LAB
ABS NEUT (OLG): 4.97 X10(3)/MCL (ref 2.1–9.2)
ALBUMIN SERPL-MCNC: 3.7 GM/DL (ref 3.5–5)
ALBUMIN/GLOB SERPL: 1.2 RATIO (ref 1.1–2)
ALP SERPL-CCNC: 53 UNIT/L (ref 40–150)
ALT SERPL-CCNC: 32 UNIT/L (ref 0–55)
AST SERPL-CCNC: 25 UNIT/L (ref 5–34)
BASOPHILS # BLD AUTO: 0 X10(3)/MCL (ref 0–0.2)
BASOPHILS NFR BLD AUTO: 0 %
BILIRUB SERPL-MCNC: 0.5 MG/DL
BILIRUBIN DIRECT+TOT PNL SERPL-MCNC: 0.2 MG/DL (ref 0–0.5)
BILIRUBIN DIRECT+TOT PNL SERPL-MCNC: 0.3 MG/DL (ref 0–0.8)
BUN SERPL-MCNC: 16.8 MG/DL (ref 8.4–25.7)
CALCIUM SERPL-MCNC: 9.3 MG/DL (ref 8.4–10.2)
CHLORIDE SERPL-SCNC: 106 MMOL/L (ref 98–107)
CO2 SERPL-SCNC: 26 MMOL/L (ref 22–29)
CREAT SERPL-MCNC: 0.85 MG/DL (ref 0.73–1.18)
EOSINOPHIL # BLD AUTO: 0.5 X10(3)/MCL (ref 0–0.9)
EOSINOPHIL NFR BLD AUTO: 7 %
ERYTHROCYTE [DISTWIDTH] IN BLOOD BY AUTOMATED COUNT: 12.5 % (ref 11.5–14.5)
EST. AVERAGE GLUCOSE BLD GHB EST-MCNC: 177.2 MG/DL
GLOBULIN SER-MCNC: 3.1 GM/DL (ref 2.4–3.5)
GLUCOSE SERPL-MCNC: 151 MG/DL (ref 74–100)
HBA1C MFR BLD: 7.8 %
HCT VFR BLD AUTO: 37.1 % (ref 40–51)
HGB BLD-MCNC: 12.7 GM/DL (ref 13.5–17.5)
IMM GRANULOCYTES # BLD AUTO: 0.04 10*3/UL
IMM GRANULOCYTES NFR BLD AUTO: 0 %
LYMPHOCYTES # BLD AUTO: 1.4 X10(3)/MCL (ref 0.6–4.6)
LYMPHOCYTES NFR BLD AUTO: 18 %
MCH RBC QN AUTO: 32.3 PG (ref 26–34)
MCHC RBC AUTO-ENTMCNC: 34.2 GM/DL (ref 31–37)
MCV RBC AUTO: 94.4 FL (ref 80–100)
MONOCYTES # BLD AUTO: 0.7 X10(3)/MCL (ref 0.1–1.3)
MONOCYTES NFR BLD AUTO: 9 %
NEUTROPHILS # BLD AUTO: 4.97 X10(3)/MCL (ref 2.1–9.2)
NEUTROPHILS NFR BLD AUTO: 65 %
NRBC BLD AUTO-RTO: 0 % (ref 0–0.2)
PLATELET # BLD AUTO: 245 X10(3)/MCL (ref 130–400)
PMV BLD AUTO: 10.2 FL (ref 7.4–10.4)
POTASSIUM SERPL-SCNC: 3.8 MMOL/L (ref 3.5–5.1)
PROT SERPL-MCNC: 6.8 GM/DL (ref 6.4–8.3)
RBC # BLD AUTO: 3.93 X10(6)/MCL (ref 4.5–5.9)
SODIUM SERPL-SCNC: 138 MMOL/L (ref 136–145)
WBC # SPEC AUTO: 7.6 X10(3)/MCL (ref 4.5–11)

## 2022-02-01 ENCOUNTER — HISTORICAL (OUTPATIENT)
Dept: INTERNAL MEDICINE | Facility: CLINIC | Age: 52
End: 2022-02-01

## 2022-02-01 LAB
EST. AVERAGE GLUCOSE BLD GHB EST-MCNC: 165.7 MG/DL
HBA1C MFR BLD: 7.4 %

## 2022-03-03 ENCOUNTER — HISTORICAL (OUTPATIENT)
Dept: INTERNAL MEDICINE | Facility: CLINIC | Age: 52
End: 2022-03-03

## 2022-03-03 LAB
ABS NEUT (OLG): 8.03 (ref 2.1–9.2)
ALBUMIN SERPL-MCNC: 4.1 G/DL (ref 3.5–5)
ALBUMIN/GLOB SERPL: 1.2 {RATIO} (ref 1.1–2)
ALP SERPL-CCNC: 53 U/L (ref 40–150)
ALT SERPL-CCNC: 30 U/L (ref 0–55)
APPEARANCE, UA: CLEAR
AST SERPL-CCNC: 21 U/L (ref 5–34)
BACTERIA SPEC CULT: NORMAL
BASOPHILS # BLD AUTO: 0 10*3/UL (ref 0–0.2)
BASOPHILS NFR BLD AUTO: 0 %
BILIRUB SERPL-MCNC: 0.4 MG/DL
BILIRUB UR QL STRIP: NEGATIVE
BILIRUBIN DIRECT+TOT PNL SERPL-MCNC: 0.2 (ref 0–0.5)
BILIRUBIN DIRECT+TOT PNL SERPL-MCNC: 0.2 (ref 0–0.8)
BUN SERPL-MCNC: 17.9 MG/DL (ref 8.4–25.7)
CALCIUM SERPL-MCNC: 9.4 MG/DL (ref 8.7–10.5)
CHLORIDE SERPL-SCNC: 105 MMOL/L (ref 98–107)
CHOLEST SERPL-MCNC: 137 MG/DL
CHOLEST/HDLC SERPL: 4 {RATIO} (ref 0–5)
CO2 SERPL-SCNC: 28 MMOL/L (ref 22–29)
COLOR UR: COLORLESS
CREAT SERPL-MCNC: 1.05 MG/DL (ref 0.73–1.18)
CREAT UR-MCNC: 38.8 MG/DL (ref 58–161)
EOSINOPHIL # BLD AUTO: 0.4 10*3/UL (ref 0–0.9)
EOSINOPHIL NFR BLD AUTO: 4 %
ERYTHROCYTE [DISTWIDTH] IN BLOOD BY AUTOMATED COUNT: 12.6 % (ref 11.5–14.5)
EST. AVERAGE GLUCOSE BLD GHB EST-MCNC: 157.1 MG/DL
FLAG2 (OHS): 80
FLAG3 (OHS): 90
FLAGS (OHS): 70
GLOBULIN SER-MCNC: 3.5 G/DL (ref 2.4–3.5)
GLUCOSE (UA): NORMAL
GLUCOSE SERPL-MCNC: 148 MG/DL (ref 74–100)
HBA1C MFR BLD: 7.1 %
HCT VFR BLD AUTO: 38.9 % (ref 40–51)
HDLC SERPL-MCNC: 39 MG/DL (ref 35–60)
HEMOLYSIS INTERF INDEX SERPL-ACNC: 4
HGB BLD-MCNC: 13.2 G/DL (ref 13.5–17.5)
HGB UR QL STRIP: NEGATIVE
HYALINE CASTS #/AREA URNS LPF: NORMAL /[LPF]
ICTERIC INTERF INDEX SERPL-ACNC: 0
IMM GRANULOCYTES # BLD AUTO: 0.04 10*3/UL
IMM GRANULOCYTES NFR BLD AUTO: 0 %
KETONES UR QL STRIP: NEGATIVE
LDLC SERPL CALC-MCNC: 68 MG/DL (ref 50–140)
LEUKOCYTE ESTERASE UR QL STRIP: 75
LIPEMIC INTERF INDEX SERPL-ACNC: 10
LOW EVENT # SUSPECT FLAG (OHS): 70
LYMPHOCYTES # BLD AUTO: 1.3 10*3/UL (ref 0.6–4.6)
LYMPHOCYTES NFR BLD AUTO: 12 %
MANUAL DIFF? (OHS): NO
MCH RBC QN AUTO: 32 PG (ref 26–34)
MCHC RBC AUTO-ENTMCNC: 33.9 G/DL (ref 31–37)
MCV RBC AUTO: 94.4 FL (ref 80–100)
MICROALBUMIN UR-MCNC: 6.7
MICROALBUMIN/CREAT RATIO PNL UR: 17.3 (ref 0–30)
MO BLASTS SUSPECT FLAG (OHS): 40
MONOCYTES # BLD AUTO: 0.8 10*3/UL (ref 0.1–1.3)
MONOCYTES NFR BLD AUTO: 8 %
MUCOUS THREADS URNS QL MICRO: NORMAL
NEUTROPHILS # BLD AUTO: 8.03 10*3/UL (ref 2.1–9.2)
NEUTROPHILS NFR BLD AUTO: 75 %
NITRITE UR QL STRIP: NEGATIVE
NRBC BLD AUTO-RTO: 0 % (ref 0–0.2)
PH UR STRIP: 5 [PH] (ref 4.5–8)
PLATELET # BLD AUTO: 315 10*3/UL (ref 130–400)
PLATELET CLUMPS SUSPECT FLAG (OHS): 10
PMV BLD AUTO: 10.4 FL (ref 7.4–10.4)
POTASSIUM SERPL-SCNC: 3.6 MMOL/L (ref 3.5–5.1)
PROT SERPL-MCNC: 7.6 G/DL (ref 6.4–8.3)
PROT UR QL STRIP: NEGATIVE
PSA SERPL-MCNC: 0.1 NG/ML
RBC # BLD AUTO: 4.12 10*6/UL (ref 4.5–5.9)
RBC #/AREA URNS HPF: NORMAL /[HPF] (ref 0–5)
SODIUM SERPL-SCNC: 140 MMOL/L (ref 136–145)
SP GR UR STRIP: 1.01 (ref 1–1.03)
SQUAMOUS EPITHELIAL, UA: NORMAL
TRIGL SERPL-MCNC: 150 MG/DL (ref 34–140)
TSH SERPL-ACNC: 0.87 M[IU]/L (ref 0.35–4.94)
UROBILINOGEN UR STRIP-ACNC: NORMAL
VLDLC SERPL CALC-MCNC: 30 MG/DL
WBC # SPEC AUTO: 10.7 10*3/UL (ref 4.5–11)
WBC #/AREA URNS HPF: NORMAL /[HPF] (ref 0–5)

## 2022-04-09 ENCOUNTER — HISTORICAL (OUTPATIENT)
Dept: ADMINISTRATIVE | Facility: HOSPITAL | Age: 52
End: 2022-04-09

## 2022-04-12 ENCOUNTER — HISTORICAL (OUTPATIENT)
Dept: INTERNAL MEDICINE | Facility: CLINIC | Age: 52
End: 2022-04-12

## 2022-04-12 LAB
ALBUMIN SERPL-MCNC: 3.6 G/DL (ref 3.5–5)
ALBUMIN/GLOB SERPL: 1.1 {RATIO} (ref 1.1–2)
ALP SERPL-CCNC: 47 U/L (ref 40–150)
ALT SERPL-CCNC: 25 U/L (ref 0–55)
AST SERPL-CCNC: 24 U/L (ref 5–34)
BILIRUB SERPL-MCNC: 0.4 MG/DL
BILIRUBIN DIRECT+TOT PNL SERPL-MCNC: 0.2 (ref 0–0.5)
BILIRUBIN DIRECT+TOT PNL SERPL-MCNC: 0.2 (ref 0–0.8)
BUN SERPL-MCNC: 20.8 MG/DL (ref 8.4–25.7)
CALCIUM SERPL-MCNC: 9 MG/DL (ref 8.7–10.5)
CHLORIDE SERPL-SCNC: 106 MMOL/L (ref 98–107)
CHOLEST SERPL-MCNC: 134 MG/DL
CHOLEST/HDLC SERPL: 4 {RATIO} (ref 0–5)
CO2 SERPL-SCNC: 27 MMOL/L (ref 22–29)
CREAT SERPL-MCNC: 1.13 MG/DL (ref 0.73–1.18)
GLOBULIN SER-MCNC: 3.2 G/DL (ref 2.4–3.5)
GLUCOSE SERPL-MCNC: 217 MG/DL (ref 74–100)
HDLC SERPL-MCNC: 35 MG/DL (ref 35–60)
HEMOLYSIS INTERF INDEX SERPL-ACNC: 8
ICTERIC INTERF INDEX SERPL-ACNC: 0
LDLC SERPL CALC-MCNC: 59 MG/DL (ref 50–140)
LIPEMIC INTERF INDEX SERPL-ACNC: 13
POTASSIUM SERPL-SCNC: 3.6 MMOL/L (ref 3.5–5.1)
PROT SERPL-MCNC: 6.8 G/DL (ref 6.4–8.3)
SODIUM SERPL-SCNC: 141 MMOL/L (ref 136–145)
TRIGL SERPL-MCNC: 200 MG/DL (ref 34–140)
VLDLC SERPL CALC-MCNC: 40 MG/DL

## 2022-04-27 VITALS
WEIGHT: 267.44 LBS | DIASTOLIC BLOOD PRESSURE: 75 MMHG | OXYGEN SATURATION: 96 % | HEIGHT: 65 IN | SYSTOLIC BLOOD PRESSURE: 115 MMHG | BODY MASS INDEX: 44.56 KG/M2

## 2022-04-29 DIAGNOSIS — Z12.11 SCREENING FOR MALIGNANT NEOPLASM OF COLON: Primary | ICD-10-CM

## 2022-04-29 DIAGNOSIS — E11.65 TYPE 2 DIABETES MELLITUS WITH HYPERGLYCEMIA, WITHOUT LONG-TERM CURRENT USE OF INSULIN: ICD-10-CM

## 2022-05-01 NOTE — H&P
Patient:   Mynor Recio            MRN: [id number]            FIN: [id number]               Age:   49 years     Sex:  Male     :  1970   Associated Diagnoses:   None   Author:   Darlin PAGAN, Jenise      DATE AND REASON OF ADMISSION:   PCP: N/A   Cardiologist: Dr. Raghavendra Delgado.   Patient was admitted to ICU after PCI x 3.     HISTORY OF PRESENT ILLNESS:   49 year old male with a past medical history of HFrEF - EF 30-35% 2019 - NYHA Class II-III, hypertension, hyperlipidemia, and morbid obesity. Patient has a LHC on 19 with RCA: 50% proximal lesion and 50% distal lesion. Luminal irregularities throughout RCA, PDA, and PLB; left circumflex: 80% proximal lesion. Today patient underwent LHC with PCI x3 in ostial OM1, mid/distal circumflex and proximal/mid circumflex. Patient was admitted to ICU post- procedure for observation. He denies any chest pain or SOB at this time. Feels good after procedure. He has been ambulating around the room. Tolerates oral intake well with no nausea or vomiting.      ALLERGIES:   No known allergies    REVIEW OF SYSTEMS:   As above in HPI otherwise negative    PAST MEDICAL HISTORY:   HFrEF - EF 30-35% 2019 - NYHA Class II-III, 2 vessel CAD, hypertension, hyperlipidemia, and morbid obesity    SOCIAL HISTORY: : Patient lives at home with his friend, patient reports previous history of heavy smoking, 1 pack/day for 20 years. Patient states that he quit 10 years ago. Denies any alcohol or illicit drug use.    FAMILY HISTORY:   Noncontributory     PHYSICAL EXAMINATION:   Temperature 36.4  (10:00)  Systolic Blood Pressure 159  (10:00)  Diastolic Blood Pressure 98  (10:00)  Pulse 94  (10:00)  SpO2 95  (10:00)  Respiratory Rate 18  (10:00)    GENERAL:  In no apparent distress, sitting on chair to watch TV  HEENT:  Pupils are equal and reactive. Oropharynx is normal.  NECK:  Supple. No adenopathy, no JVD.    CHEST: Clear breath sounds throughout  CARDIAC:  Regular  rate and rhythm.  S1 and S2, without murmurs, gallops, or rubs.  ABDOMEN:  Soft, NT, ND  EXTREMITIES:  No Edema, clubbing or cyanosis. Vascular access R wrist- no bleeding.   NEUROLOGIC EXAM:  Alert and oriented x 3.  No focal sensory or strength deficits. Speech normal.       LABORATORY DATA:   Labs Last 24 Hours   Chemistry  Hematology/Coagulation    Sodium Lvl: 136 mmol/L (19 09:38:00) PT: 12.9 second(s) (19 09:38:00)   Potassium Lvl: 3.7 mmol/L (19 09:38:00) INR: 0.98 (19 09:38:00)   Chloride: 101 mmol/L (19:38:00) PTT: 26.8 second(s) (19:38:00)   CO2: 27 mmol/L (19 09:38:00) POC ACT: 257 second(s) High (19 17:05:00)   Calcium Lvl: 8.4 mg/dL Low (19 09:38:00) WBC: 8.2 x10(3)/mcL (19 09:38:00)   Glucose Lvl: 303 mg/dL High (19 09:38:00) RBC: 4.42 x10(6)/mcL Low (19 09:38:00)   BUN: 15 mg/dL (19 09:38:00) Hgb: 14.3 gm/dL (19 09:38:00)   Creatinine: 0.9 mg/dL (19 09:38:00) Hct: 42 % (19 09:38:00)   BUN/Creat Ratio: 17 (19 09:38:00) Platelet: 208 x10(3)/mcL (19 09:38:00)   eGFR-AA: >105 (19 09:38:00) MCV: 95 fL (19 09:38:00)   eGFR-JEREMY: 95 mL/min (19 09:38:00) MCH: 32.4 pg (09/25/19 09:38:00)   Chol: 202 mg/dL High (19 09:38:00) MCHC: 34 gm/dL (19 09:38:00)   HDL: 31 mg/dL Low (19 09:38:00) RDW: 12.5 % (19 09:38:00)   Tri mg/dL High (19 09:38:00) MPV: 10.6 fL High (19 09:38:00)   LDL: See Comment. (19 09:38:00) Abs Neut: 5.26 x10(3)/mcL (19 09:38:00)   Chol/HDL: 6.5 High (19 09:38:00) Neutro Auto: 64 % (19 09:38:00)   VLDL: See Comment. (19 09:38:00) Lymph Auto: 19 % (19 09:38:00)    Mono Auto: 11 % (19 09:38:00)    Eos Auto: 4 % (19 09:38:00)    Abs Eos: 0.4 x10(3)/mcL (19 09:38:00)    Basophil Auto: 0 % (19 09:38:00)    Abs Neutro: 5.26 x10(3)/mcL (19 09:38:00)    Abs Lymph: 1.6  x10(3)/mcL (09/25/19 09:38:00)    Abs Mono: 0.9 x10(3)/mcL (09/25/19 09:38:00)    Abs Baso: 0 x10(3)/mcL (09/25/19 09:38:00)    IG%: 1 % (09/25/19 09:38:00)    IG#: 0.06 (09/25/19 09:38:00)       RADIOLOGICAL DATA:   None     IMPRESSION:  1.  Two vessel CAD s/p PCI x 3 today 9/25/19  2. HFrEF - EF 30-35%   3. DMII  4. HTN   5. HLD   6. Cigarette smoker.     PLAN:  Continue to monitor in  ICU for any signs of complications post procedure including chest pain, chest tightness, SOB, bleeding.   Follow cardiology recommendations:   - Monitor site and release air from TR band per protocol.   - Dual antiplatelet therapy (without any interruptions) for a minimum of 12 months.   - Patient is currently on Lipitor 40mg daily   Low SSI for DM  F/u AM labs and EKG    Nutrition: Cardiac diet   PPx: Lovenox 40mg to start in AM   Lines: peripheral lines

## 2022-05-01 NOTE — ED PROVIDER NOTES
Patient:   Mynor Recio            MRN: [id number]            FIN: [id number]               Age:   49 years     Sex:  Male     :  1970   Associated Diagnoses:   Uncontrolled hypertension; Uncontrolled diabetes mellitus; Acute CHF   Author:   Dandre PAGAN, Ja ULRICH      Basic Information   Time seen: Immediately upon arrival.   History source: Patient.   Arrival mode: Private vehicle.   History limitation: None.      History of Present Illness   The patient presents with difficulty breathing.  The onset was 3  days ago.  The course/duration of symptoms is constant.  Degree at onset mild.  Degree at present mild.  The Exacerbating factors is exertion.  The Relieving factors is rest.  Risk factors consist of diabetes mellitus.  Prior episodes: none.  Therapy today: none.  Associated symptoms: denies chest pain, denies fever, denies cough, denies nausea, denies abdominal pain, denies weight gain and denies hemoptysis.        Review of Systems   Constitutional symptoms:  No fever, no chills, no sweats, no weakness.    Skin symptoms:  No rash, no lesion.    Eye symptoms:  No recent vision problems,    Respiratory symptoms:  Negative except as documented in HPI, no orthopnea, no cough, no sputum production.    Cardiovascular symptoms:  No chest pain, no palpitations, no tachycardia, no syncope, no diaphoresis, no peripheral edema.    Gastrointestinal symptoms:  No abdominal pain, no nausea, no vomiting, no diarrhea, no constipation, no rectal bleeding.    Genitourinary symptoms:  No dysuria, no hematuria, no testicular pain.    Musculoskeletal symptoms:  No back pain, no Joint pain.    Neurologic symptoms:  Numbness, tingling, Bilateral hand/fingers area for 1 week, no headache, no dizziness.    Endocrine symptoms:  Polydipsia.             Additional review of systems information: All other systems reviewed and otherwise negative.      Health Status   Allergies:    Allergic Reactions  (Selected)  No Known Medication Allergies,    Allergies (1) Active Reaction  No Known Medication Allergies None Documented.   Medications:  (Selected)   Prescriptions  Prescribed  Flomax 0.4 mg oral capsule: 0.4 mg = 1 cap(s), Oral, Daily, # 30 cap(s), 0 Refill(s)  Zofran 4 mg oral tablet: 4 mg = 1 tab(s), Oral, q8hr, PRN PRN nausea, # 10 tab(s), 0 Refill(s)  Documented Medications  Documented  metFORMIN 1000 mg oral tablet: 1,000 mg = 1 tab(s), Oral, BID, 0 Refill(s).      Past Medical/ Family/ Social History   Medical history:    Active  Diabetes mellitus type 2 ([id number]).   Surgical history: Negative.   Family history:    No family history items have been selected or recorded..   Social history: Alcohol use: Denies, Tobacco use: Denies, Drug use: Denies.   Problem list:    No qualifying data available.      Physical Examination               Vital Signs      No qualifying data available.   Oxygen saturation.   General:  Alert, no acute distress.    Skin:  Warm, dry, pink, intact, no pallor.    Head:  Normocephalic.   Neck:  Supple, trachea midline, no JVD, no carotid bruit.    Eye:  Pupils are equal, round and reactive to light, normal conjunctiva.    Ears, nose, mouth and throat:  Oral mucosa moist.   Cardiovascular:  No murmur, Normal peripheral perfusion, No edema, irregularly irregular rhythm.    Respiratory:  Respirations are non-labored, breath sounds are equal, Symmetrical chest wall expansion, Breath sounds: Bilateral, base(s), crackles present.    Gastrointestinal:  Soft, Nontender, Non distended, Normal bowel sounds, No organomegaly.    Back:  Normal range of motion.   Musculoskeletal:  Normal ROM, normal strength, no swelling.    Neurological:  Alert and oriented to person, place, time, and situation, No focal neurological deficit observed, normal motor observed, normal speech observed, normal coordination observed.    Psychiatric:  Cooperative, appropriate mood & affect.       Medical Decision  Making   Electrocardiogram:  Time 2/27/2019 11:56:00, rate 118, EP Interp, The Rhythm is sinus tachycardia.  , The Axis is normal.  , Ectopy Frequent, premature ventricular contractions.    Results review:  Lab results : Lab View   2/27/2019 12:33 CST      POC Troponin              0.01 ng/mL    2/27/2019 12:25 CST      Potassium Lvl             3.6 mmol/L                             CO2                       28 mmol/L                             Magnesium Lvl             2.0 mg/dL                             Glucose Lvl               246 mg/dL  HI                             BUN                       15 mg/dL                             Creatinine                0.80 mg/dL                             Bili Total                0.6 mg/dL                             AST                       58 unit/L  HI                             ALT                       97 unit/L  HI                             Alk Phos                  104 unit/L                             Albumin Lvl               3.9 gm/dL                             Phosphorus                1.9 mg/dL  LOW                             NT pro BNP.               804 pg/mL  HI                             Total CK                  180 unit/L                             CK MB                     5.4 ng/mL  HI                             TSH                       1.590 mIU/L                             INR                       0.97                             WBC                       11.1 x10(3)/mcL  HI                             Hgb                       15.9 gm/dL                             Hct                       46.8 %                             Platelet                  245 x10(3)/mcL                             Neutro Auto               81 x10(3)/mcL  NA                             Lymph Auto                10 %  LOW                             Mono Auto                 7 %  ,    No qualifying data available.       Procedure   Critical care note    Total time: 30 minutes spent engaged in work directly related to patient care and/ or available for direct patient care.   Critical condition(s) addressed for impending deterioration include: cardiovascular.   Associated risk factors: hypertension.   Management: bedside assessment, supervision of care, Interpretation (chest x-ray, electrocardiogram), Interventions hemodynamic management.   Performed by: self.      Impression and Plan   Diagnosis   Uncontrolled hypertension (EWK55-TK I10)   Uncontrolled diabetes mellitus (TJH99-KC E11.65)   Acute CHF (BJQ95-YY I50.9)      Calls-Consults   -  2/27/2019 13:23:00 , Medicine, consult.    Plan   Condition: Stable.    Disposition: Admit time  2/27/2019 13:24:00, Admit to Inpatient Telemetry Unit.    Counseled: Patient, Regarding diagnosis, Regarding diagnostic results, Regarding treatment plan, Patient indicated understanding of instructions.

## 2022-05-02 NOTE — HISTORICAL [facility] CERNER
This is a historical note converted from Chris. Formatting and pictures may have been removed.  Please reference Chris for original formatting and attached multimedia. Chief Complaint  complains of shortness of breath for the past 3-4 days , worse this morning, pt is obese , pmh of diabetes. ekg taken, dr choudhary notified . complains of tingling in the fingers as well for the past 10 days  History of Present Illness  ?? The patient is a 50 yo man who with a PMH significant for DM type 2 and HTN who presents to the ED with complaints of shortness of breath. Patient states that he noticed a gradual worsening of symptoms beginning 10 days ago, however over the last 2-3 days SOB severely increased. Pt stated that he woke up this morning with the feeling of not being able to breathe. ?She states that at baseline he does have?dyspnea on exertion, but?this is also worsened.??Reports that he does have a history of hypertension and has never been on medications for it.? Otherwise patient denies chest pain, nausea/vomiting, palpitations, lower extremity swelling.? Patient has 2 pillow orthopnea, denies PND?or excessive daytime sleepiness. ?Denies any recent weight gain.? Denies any headaches, changes in vision, 8 focal neurological deficits,?urinary changes?or changes in bowel habit.  ?? The ED,?vital signs significant for blood pressure 185/91, tachycardia 118, respiratory rate 26, breathing 93% on room air.? Patient was then put on nasal cannula 2 L and improved O2 sats, tachycardia still persistent though improving.? Patient given 10 mg lisinopril with improvement in blood pressure, 20 mg IV Lasix also given with good urine output.? Medicine then consulted for new onset CHF.  ?  Social history: Patient lives at home with his friend,?patient reports previous history of?heavy smoking, 1 pack/day for 20 years.? Patient states that he quit 10 years ago. ?Denies any alcohol or illicit drug use.  Family history:  Noncontributory  Medical history:?Patient has diabetes mellitus, however does not?check his sugars on a regular basis. ?Does take metformin, reports compliance  Allergies?NKDA  Review of Systems  CONSTITUTIONAL: No weight loss, fever, chills, weakness or fatigue.  HEENT:?No visual loss, changes in vision.?No hearing loss, sneezing, congestion, runny nose or sore throat.  SKIN: No rash or itching. Skin thickening on L fingertips  CARDIOVASCULAR: No chest pain, chest pressure or chest discomfort. No palpitations or edema.  RESPIRATORY: No cough or sputum. +shortness of breath  GASTROINTESTINAL: No anorexia, nausea, vomiting or diarrhea. No abdominal pain or blood.  GENITOURINARY: No burning on urination, dysuria, hematuria, frequency/urgency.  NEUROLOGICAL: No headache, dizziness, syncope, paralysis, ataxia, numbness or tingling in the extremities. No change in bowel or bladder control.  MUSCULOSKELETAL: No muscle, back pain, joint pain or stiffness.  HEMATOLOGIC: No anemia, bleeding or bruising.  LYMPHATICS: No enlarged nodes.  ENDOCRINOLOGIC: No reports of sweating, cold or heat intolerance. No polyuria or polydipsia.  Physical Exam  Vitals & Measurements  T:?36.4? ?C (Oral)? HR:?111(Peripheral)? RR:?24? BP:?184/91? SpO2:?97%? WT:?135?kg?  General: No acute distress. Breathing on 2L via NC  Eye: Extraocular movements are intact, Normal conjunctiva. PERRLA.  HENT: Normal hearing, Oral mucosa is moist, no pharyngeal erythema  Neck: Supple, Non-tender, +?hepatojugular reflex, JVD  Respiratory: Crackles heard in bilateral bases. ?Respirations are non-labored, Breath sounds are equal, Symmetrical chest wall expansion.  Cardiovascular: Regular rhythm?and rate, normal?S1, S2, No murmurs?appreciated, Normal peripheral perfusion, 2+ pitting edema  Gastrointestinal: Soft, Non-tender, bowel sounds present, obese  Musculoskeletal: No tenderness, no swelling  Integumentary: Warm, Moist, No pallor. ?Mildly  diaphoretic.  Neurologic: Alert, Oriented, No gross focal deficits, Cranial Nerves II-XII are grossly intact.  Cognition and Speech: Oriented, Speech clear and coherent, Functional cognition intact.  Psychiatric: Cooperative, Appropriate mood & affect  Assessment/Plan  1. New onset CHF  -Suspect that etiology is uncontrolled HTN  -CXR results below, consistent with vascular congestion  -Ordered ECHO  -Pt given 20mg IV lasix in ED, currently having good urine output  -Will continue IV lasix and monitor volume status  -Clinically fits NYHA Class II  -Strict Is and Os, fluid restrict to 1.5L, daily weights  -Full dose aspirin given in ED, continue 81mg daily  -Will check EKG and trops q6hrs  -Initiate Atorvastatin 40mg daily  -Depending on ECHO results, will assess need for lexiscan/LHC, may need to be NPO after midnight  -After adequate diuresis, will initiate ACEi, beta blocker  ?   2. Hypertension, uncontrolled  -Admission /91  -Patient not currently on medications at home  -Given Lisinopril 10mg in the ED, SBP on my exam was in the 130s  -Will plan to continue ACEi upon discharge  ?   3. Diabetes Mellitus, uncontrolled  -Patient takes Metformin at home  -Will hold as inpatient and continue on sliding scale  -Presentation   -Ordered A1c  -Diabetic education to be given  ?   Disposition: Admit to telemetry unit. Continue IV diuresis for now, currently monitoring patients volume status. Fluid restriction, strict I/Os, daily weights. Lovenox for DVT prophylaxis. Possible lexiscan tomorrow if ECHO confirms reduced EF.   Problem List/Past Medical History  Ongoing  Diabetes mellitus type 2  Historical  No qualifying data  Medications  Inpatient  acetaminophen, 1000 mg= 2 tab(s), Oral, q6hr, PRN  acetaminophen, 650 mg= 2 tab(s), Oral, q6hr, PRN  Lovenox, 40 mg= 0.4 mL, Subcutaneous, Daily  Phenergan, 12.5 mg= 0.5 mL, IV Push, q4hr, PRN  Zofran, 4 mg= 2 mL, IV Push, q4hr,  PRN  Home  cyclobenzaprine  finasteride  metFORMIN  Allergies  No Known Medication Allergies  Social History  Tobacco  Former smoker, quit more than 30 days ago, No, 02/27/2019  Former smoker, Cigarettes, 01/05/2018  Family History  Non Contributory  Lab Results  Labs Last 24 Hours?  ?Chemistry? Hematology/Coagulation?   Sodium Lvl: 136 mmol/L (02/27/19 12:25:00) PT: 12.8 second(s) (02/27/19 12:25:00)   Potassium Lvl: 3.6 mmol/L (02/27/19 12:25:00) INR: 0.97 (02/27/19 12:25:00)   Chloride: 101 mmol/L (02/27/19 12:25:00) PTT: 27.1 second(s) (02/27/19 12:25:00)   CO2: 28 mmol/L (02/27/19 12:25:00) WBC:?11.1 x10(3)/mcL?High (02/27/19 12:25:00)   Calcium Lvl: 8.9 mg/dL (02/27/19 12:25:00) RBC: 5.02 x10(6)/mcL (02/27/19 12:25:00)   Magnesium Lvl: 2 mg/dL (02/27/19 12:25:00) Hgb: 15.9 gm/dL (02/27/19 12:25:00)   Glucose Lvl:?246 mg/dL?High (02/27/19 12:25:00) Hct: 46.8 % (02/27/19 12:25:00)   BUN: 15 mg/dL (02/27/19 12:25:00) Platelet: 245 x10(3)/mcL (02/27/19 12:25:00)   Creatinine: 0.8 mg/dL (02/27/19 12:25:00) MCV: 93.2 fL (02/27/19 12:25:00)   eGFR-AA: >105 (02/27/19 12:25:00) MCH: 31.7 pg (02/27/19 12:25:00)   eGFR-JEREMY: >105 (02/27/19 12:25:00) MCHC: 34 gm/dL (02/27/19 12:25:00)   Bili Total: 0.6 mg/dL (02/27/19 12:25:00) RDW: 12.8 % (02/27/19 12:25:00)   Bili Direct: 0.2 mg/dL (02/27/19 12:25:00) MPV:?10.8 fL?High (02/27/19 12:25:00)   Bili Indirect: 0.4 mg/dL (02/27/19 12:25:00) Abs Neut: 8.96 x10(3)/mcL (02/27/19 12:25:00)   AST:?58 unit/L?High (02/27/19 12:25:00) Neutro Auto: 81 x10(3)/mcL (02/27/19 12:25:00)   ALT:?97 unit/L?High (02/27/19 12:25:00) Lymph Auto:?10 %?Low (02/27/19 12:25:00)   Alk Phos: 104 unit/L (02/27/19 12:25:00) Mono Auto: 7 % (02/27/19 12:25:00)   Total Protein:?8.4 gm/dL?High (02/27/19 12:25:00) Eos Auto: 1 (02/27/19 12:25:00)   Albumin Lvl: 3.9 gm/dL (02/27/19 12:25:00) Abs Eos: 0.07 x10(3)/mcL (02/27/19 12:25:00)   Globulin:?4.5 gm/mL?High (02/27/19 12:25:00) Basophil Auto: 0 (02/27/19  12:25:00)   A/G Ratio:?0.9 ratio?Low (02/27/19 12:25:00) Abs Neutro: 8.96 x10(3)/mcL (02/27/19 12:25:00)   Phosphorus:?1.9 mg/dL?Low (02/27/19 12:25:00) Abs Lymph: 1.15 x10(3)/mcL (02/27/19 12:25:00)   NT pro BNP.:?804 pg/mL?High (02/27/19 12:25:00) Abs Mono: 0.79 x10(3)/mcL (02/27/19 12:25:00)   Total CK: 180 unit/L (02/27/19 12:25:00) Abs Baso: 0.04 x10(3)/mcL (02/27/19 12:25:00)   CK MB:?5.4 ng/mL?High (02/27/19 12:25:00) IG%: 1 % (02/27/19 12:25:00)   POC Troponin: 0.01 ng/mL (02/27/19 12:33:00) IG#: 0.07 (02/27/19 12:25:00)   TSH: 1.59 mIU/L (02/27/19 12:25:00)    ?  ?  Diagnostic Results  Accession:?[id number]  Order:?XR Chest 2 Views  Report Dt/Tm:?02/27/2019 13:26  Report:?  CHEST 2 VIEW RADIOGRAPHIC SERIES  ?  INDICATION: Chest Pain ?  No comparisons  ?  FINDINGS: The cardiac silhouette is enlarged. There is pulmonary  vascular congestion and mild increased interstitial markings in the  lower thorax. No sizable pleural effusion.  ?  IMPRESSION: Cardiogenic pulmonary edema likely      Essentially, a 49-year-old HM with past medical history of Bell?s palsy treated 10 years ago (does not remember treatment), chronic tobacco use, diabetes mellitus not on insulin, hypertension, chronic back pain who presents to the ED today with shortness of breath for the last several days. ?Patient reports that that for the last 7-10 days he is not short of breath but the last 2 days he became worse. ?He reports that exertion makes shortness of breath worse however denies any lower extremity edema, orthopnea but does report that he needs 2 pillows to sleep on however denies any PND. ?He reports that he can lay flat without getting short of breath. ?Patient denies any signs family history of cardiac disease however reviewing his clinical history, CXR he likely has a diagnosis of new onset CHF. ?Patients EKG was significant for multiple PVCs on negative troponins. Have obtained ECHO in the ED. ?Patient received Lasix 21 mg IV  once in ED with urine output of approximately 650 mL. ?Well monitor strict I/Os, will start 20-40mg IV BID depending on patients urine output, serum bicarb, creatinine and physical exam to adjust the dose of Lasix. ?Once the ECHO has returned, will decide a stress test such as Lexiscan vs treadmill stress test depending on physical tolerability and then decide on inpatient versus outpatient angiogram. ?Once patient is euvolemic, will start low-dose beta blocker, ACEi, high intensity statins, daily ASA. Awaiting UDS, A1C, hep panel, HIV, Mg/Phos level. Will place patient on CPAP 8mmHg at night, likely has underlying JOSE/OHS component.?   I was present with the resident during the history and physical examination.  ???  [x ] I discussed the case with the resident and agree with the findings and plan as documented in the residents note.  [ ] I discussed the case with the resident and agree with the findings and plan as documented in the residents note except:  New dx of heart failure  HX HTN, tobacco use, DM  Echo, TFTs, check Troponins  Diurese with lasix

## 2022-05-02 NOTE — HISTORICAL [facility]
This is a historical note converted from [employer]. Formatting and pictures may have been removed.  Please reference [employer] for original formatting and attached multimedia. Admit and Discharge Dates  Admit Date: 02/27/2019  Discharge Date: 02/28/2019  ?  Physicians  Attending Physician - Ramy PAGAN, Zoya AHN  Admitting Physician - Ramy PAGAN, Zoya AHN  Primary Care Physician - No PCP, No  ?  Discharge Diagnosis  Acute CHF?I50.9  HFrEF (heart failure with reduced ejection fraction)?I50.20  Shortness of breath?[id number]  Uncontrolled diabetes mellitus?E11.65  Uncontrolled hypertension?I10  Surgical Procedures  No procedures recorded for this visit.  Immunizations  No immunizations recorded for this visit.  ?  Admission Information  Patient is a?50 y/o man who with a significant past medical history of DM type 2 and HTN?admitted on 2018 for new onset of systolic heart failure. ?Patient presented to the emergency department complaining of shortness of breath that was worsening over the last 3 days. ?  ?  Significant Findings  On admission, patient was hypertensive, tachycardic, and fluid overloaded with a proBNP of 804.? Chest x-ray show pulmonary vascular congestion consistent with cardiogenic pulmonary edema.? EKG showed no ST elevation no acute ischemic changes.? Echocardiogram showed left ventricular ejection fraction of 30-35%.? Overall, hospital course was uneventful.? Patient was given Lasix with improvement of symptoms.? Has a total output of approximately 3 L since admission.? Completed Lexiscan on this admission.? Was started on lisinopril in order to control his hypertension.? Patient tolerated medications well.? He states he has significantly improved.? States that he would like to be discharged so that he can go home and attend to his pets and to get ready to go to work.? Patient is comfortable.? States his shortness improved and has no trouble breathing. denies any fever, headaches, near  syncope, vision changes,dizziness, chest pain,abdominal pain, nausea, vomiting, constipation, diarrhea, blood in the stool, dysuria, hematuria, numbness or tingling, and any weaknesses.  ?  ?  Time Spent on discharge  >45 minutes  Objective  Vitals & Measurements  T:?36.7? ?C (Oral)? TMIN:?36.4? ?C (Oral)? TMAX:?37.5? ?C (Oral)? HR:?82(Monitored)? RR:?20? BP:?136/73? SpO2:?95%? WT:?132.7?kg?  Physical Exam  General: No acute distress. Breathing on 2L via NC  Eye: Extraocular movements are intact, Normal conjunctiva. PERRLA.  HENT: Normal hearing, Oral mucosa is moist, no pharyngeal erythema  Neck: Supple, Non-tender, +?hepatojugular reflex, JVD  Respiratory: Crackles heard in bilateral bases. ?Respirations are non-labored, Breath sounds are equal, Symmetrical chest wall expansion.  Cardiovascular: Regular rhythm?and rate, normal?S1, S2, No murmurs?appreciated, Normal peripheral perfusion, 2+ pitting edema  Gastrointestinal: Soft, Non-tender, bowel sounds present, obese  Musculoskeletal: No tenderness, no swelling  Integumentary: Warm, Moist, No pallor. ?Mildly diaphoretic.  Neurologic: Alert, Oriented, No gross focal deficits, Cranial Nerves II-XII are grossly intact.  Cognition and Speech: Oriented, Speech clear and coherent, Functional cognition intact.  Psychiatric: Cooperative, Appropriate mood & affect  Patient Discharge Condition  Vital signs are stable. ?Ambulating without any difficulty. ?Tolerating p.o. intake without any nausea or vomiting. Patient is in stable condition and has no questions or concerns.  Discharge Disposition  Patient was counseled regarding any abnormal labs, differential diagnosis, treatment options, risk-benefit, lifestyle changes, prognosis, current condition, and medications. Patient was interactive and attentive.? Patients questions were answered in a respectful and timely manner. Patient was instructed to follow-up with PCP within 1 week and cardiology clinic within 2-4weeks and to  continue taking medications as prescribed.? Patient verbalized understanding and agrees to treatment plan.? Patient is stable for discharge.? Patient has no other questions or concerns at this time.? ED precautions discussed with the patient.  ?   Discharge medications:Lisinopril 5 mg daily, atorvastatin, Lasix 40 mg daily   Discharge Medication Reconciliation  Prescribed  atorvastatin (atorvastatin 20 mg oral tablet)?40 mg, Oral, Daily  Continue  cyclobenzaprine  finasteride  metFORMIN  ?  Education and Orders Provided  Heart Failure  Hypertension  Discharge - 02/28/19 9:39:00 CST, Home, May be discharged once has scripts/medications?  ?  Follow up  [facility] - Cardiology Clinic, within 1 to 2 weeks  [facility] - Medicine Clinic, within 2 to 4 weeks  ?     [1]?IM Admission H & P; Federico PAGAN, Amarilys 02/27/2019 14:22 CST   I was present with the Resident during discharge day management.  ???  [x ] I discussed the case with the Resident and agree with the discharge plan as above.  [ ] I discussed the case with the Resident and agree with the discharge plan as above except:  ???  Time spent on discharge [45 ] minutes  Lexiscan performed, will followup on results with referral to cardiology clinic  Will add beta blocker for HF treatment

## 2022-05-02 NOTE — HISTORICAL [facility]
This is a historical note converted from [employer]. Formatting and pictures may have been removed.  Please reference [employer] for original formatting and attached multimedia. Admit and Discharge Dates  Admit Date: 09/25/2019  Discharge Date: 09/26/2019  Physicians  Attending Physician - Raghavendra Delgado MD  Primary Care Physician - No PCP, No  Discharge Diagnosis  CAD s/p PCI x3  HFrEF  HTN  HLD  DM2  Surgical Procedures  No procedures recorded for this visit.  Immunizations  No immunizations recorded for this visit.  Admission Information  49 year old male with a past medical history of HFrEF - EF 30-35% Feb. 2019 - NYHA Class II-III, hypertension, hyperlipidemia, and morbid obesity. Patient has a LHC on 07/2/19 with RCA: 50% proximal lesion and 50% distal lesion. Luminal irregularities throughout RCA, PDA, and PLB; left circumflex: 80% proximal lesion. Today patient underwent LHC with PCI x3 in ostial OM1, mid/distal circumflex and proximal/mid circumflex. Patient was admitted to ICU post- procedure for observation. He denies any chest pain or SOB at this time. Feels good after procedure. He has been ambulating around the room. Tolerates oral intake well with no nausea or vomiting.  Hospital Course  Pt admitted to ICU for careful monitoring?following LHC with PCI. Procedure tolerated well, see?Cards note for detail. Pt had no events overnight including no chest pain. Ambulated in AM without difficultly. No signs of bleeding/hemorrhage at access site. Troponin <2.0, no need to trend per Cards recs. Pt discharged to home in clinically and hemodynamically stable condition.  Significant Findings  Labs Last 24 Hours?  ?Chemistry? Hematology/Coagulation?   Sodium Lvl: 138 mmol/L (09/26/19 04:25:00) WBC: 8.6 x10(3)/mcL (09/26/19 04:25:00)   Potassium Lvl: 3.9 mmol/L (09/26/19 04:25:00) RBC:?4.39 x10(6)/mcL?Low (09/26/19 04:25:00)   Chloride: 102 mmol/L (09/26/19 04:25:00) Hgb: 14.1 gm/dL (09/26/19 04:25:00)   CO2: 29 mmol/L  (09/26/19 04:25:00) Hct: 42 % (09/26/19 04:25:00)   Calcium Lvl:?8.3 mg/dL?Low (09/26/19 04:25:00) Platelet: 213 x10(3)/mcL (09/26/19 04:25:00)   Glucose Lvl:?269 mg/dL?High (09/26/19 04:25:00) MCV: 95.7 fL (09/26/19 04:25:00)   BUN: 12 mg/dL (09/26/19 04:25:00) MCH: 32.1 pg (09/26/19 04:25:00)   Creatinine: 0.7 mg/dL (09/26/19 04:25:00) MCHC: 33.6 gm/dL (09/26/19 04:25:00)   BUN/Creat Ratio: 17 (09/26/19 04:25:00) RDW: 12.5 % (09/26/19 04:25:00)   eGFR-AA: >105 (09/26/19 04:25:00) MPV:?10.5 fL?High (09/26/19 04:25:00)   eGFR-JEREMY: >105 (09/26/19 04:25:00) Abs Neut: 5.3 x10(3)/mcL (09/26/19 04:25:00)   Troponin-I:?1.42 ng/mL?Critical (09/26/19 04:25:00) Neutro Auto: 62 % (09/26/19 04:25:00)    Lymph Auto: 23 % (09/26/19 04:25:00)    Mono Auto: 11 % (09/26/19 04:25:00)    Eos Auto: 4 % (09/26/19 04:25:00)    Abs Eos: 0.3 x10(3)/mcL (09/26/19 04:25:00)    Basophil Auto: 0 % (09/26/19 04:25:00)    Abs Neutro: 5.3 x10(3)/mcL (09/26/19 04:25:00)    Abs Lymph: 2 x10(3)/mcL (09/26/19 04:25:00)    Abs Mono: 0.9 x10(3)/mcL (09/26/19 04:25:00)    Abs Baso: 0 x10(3)/mcL (09/26/19 04:25:00)    IG%: 0 % (09/26/19 04:25:00)    IG#: 0.04 (09/26/19 04:25:00)   Time Spent on discharge  >30 min  Objective  Vitals & Measurements  T:?36.8? ?C (Oral)? TMIN:?36.4? ?C (Oral)? TMAX:?37.2? ?C (Oral)? HR:?81(Peripheral)? HR:?79(Monitored)? RR:?16? BP:?131/87? SpO2:?95%? WT:?132.3?kg? BMI:?46.88?  Physical Exam  General:?AAO x 3, no acute distress, well appearing obese male  Eye: PERRLA, EOMI, clear conjunctiva, eyelids normal  HENT:?oropharynx without erythema/exudate, oropharynx and nasal mucosal surfaces moist  Respiratory:?clear to auscultation bilaterally, normal respiratory?rate and inspiratory effort  Cardiovascular:?RRR w/o murmurs, gallops or rubs, normal peripheral pulses, no LE edema  Gastrointestinal:?non-distended, normal bowel sounds, soft, non-tender, without masses to palpation  Integumentary: radial access site clean and no  sign of hematoma  Neurologic: moves all extremities spontaneously, speech is clear, cognition in tact  Patient Discharge Condition  stable  Discharge Disposition  discharge to home   Discharge Medication Reconciliation  Prescribed  clopidogrel (clopidogrel 75 mg oral tablet)?75 mg, Oral, Daily  nitroglycerin (nitroglycerin 0.4 mg sublingual TAB)?0.4 mg, SL, q5min, PRN as needed for chest pain  Continue  FLUoxetine (fluoxetine 20 mg oral capsule)?20 mg, Oral, Daily  aspirin (aspirin 81 mg oral Delayed Release (EC) tablet)?81 mg, Oral, Daily  aspirin (aspirin 81 mg oral Delayed Release (EC) tablet)?81 mg, Oral, Daily  atorvastatin (atorvastatin 40 mg oral tablet)?40 mg, Oral, Daily  cyclobenzaprine (cyclobenzaprine 10 mg oral tablet)?10 mg, Oral, BID  finasteride (finasteride 5 mg oral tablet)?5 mg, Oral, Daily  furosemide (Lasix 40 mg oral tablet)?40 mg, Oral, BID  glimepiride (glimepiride 4 mg oral tablet)?4 mg, Oral, Daily  glyBURIDE (glyBURIDE micronized 6 mg oral tablet)?6 mg, Oral, Daily  ibuprofen (Motrin 800 mg oral tablet)?1 tab(s), Oral, BID  metFORMIN  metoprolol (metoprolol succinate 50 mg oral tablet extended release)?50 mg, Oral, Daily  Education and Orders Provided  Radial Site Care ISIS UPDATED 4-2019 (HANNA)  Moderate Conscious Sedation, Adult, Care After  Surgical Site Infection (Custom) (Custom)  Discharge - 09/26/19 9:25:00 CDT, Home?  Follow up  [facility] Card[facility]  ????Keep scheduled follow up  [facility] Medicine Clinic, within 2 to 4 weeks  ????Post Wards Visit with Firm 2  Report to Emergency Department if symptoms return or worsen

## 2022-05-02 NOTE — HISTORICAL [facility]ER
This is a historical note converted from [employer]. Formatting and pictures may have been removed.  Please reference [employer] for original formatting and attached multimedia. Chief Complaint  F/U  History of Present Illness  Pt is a?50 Years?old? Male here for f/u visit. PMH uncontrolled diabetes,?hypertension,?HLD, HFrEF (LVEF?30-35%;?2/27/2019), and?s/p PCI with?stent x2 (OM1 and mid circumflex; September 2019), ventral hernia, morbid obesity, depression, and tobacco abuse.?Followed by [facility] cardio clinic. Reports med compliance. Reports CBGs ranging?in the high 100s?when checked sporadically in the am. Is not following ADA/dash diet; continues not to exercise.?Did not complete labs prior to visit as instructed; will complete prior to leaving today, pt is fasting. Denies chest pain, shortness of breath,?cough, fever, headache,?dizziness, weakness, abdominal pain, nausea,?vomiting, diarrhea, constipation, dysuria, SI/HI.  Review of Systems  Constitutional: negative except as stated in HPI  Eye: negative except as stated in HPI  ENT: negative except as stated in HPI  Respiratory: negative except as stated in HPI  Cardiovascular: negative except as stated in HPI  Gastrointestinal: negative except as stated in HPI  Genitourinary: negative except as stated in HPI  Heme/Lymph: negative except as stated in HPI  Endocrine: negative except as stated in HPI  Immunologic: negative except as stated in HPI  Musculoskeletal: negative except as stated in HPI  Integumentary: negative except as stated in HPI  Neurologic: negative except as stated in HPI  ?   All Other ROS_ negative except as stated in HPI  Physical Exam  Vitals & Measurements  T:?36.9? ?C (Oral)? HR:?91(Peripheral)? RR:?20? BP:?114/70?  HT:?168.00?cm? WT:?121.800?kg? BMI:?43.15?  General: Alert and oriented, No acute distress.  Head: Normocephalic, Atraumatic.  Eye: Pupils are equal, round and reactive to light, Extraocular movements are intact, Normal  conjunctiva, Sclera non-icteric.  Ears/Nose/Mouth/Throat: Normal hearing, Oral mucosa is moist, No pharyngeal erythema.  Neck/Thyroid: Supple, Non-tender, No lymphadenopathy, No thyromegaly, No carotid bruit, No JVD, Full range of motion.  Respiratory: Clear to auscultation bilaterally, Non-labored Respirations, Breath sounds are equal, Symmetrical chest wall expansion, No wheezes, rales or rhonchi.  Cardiovascular: Regular rate and rhythm, Normal S1/S2, No murmurs, rubs or gallops.?Normal peripheral perfusion, No edema.  Gastrointestinal: Soft, morbidly obese, Non-tender, Non-distended, Normal bowel sounds, No palpable organomegaly. Round, soft hernia to right abdomen lateral to umbilicus.  Genitourinary: No costovertebral angle tenderness, No inguinal tenderness.  Musculoskeletal: Normal range of motion, Normal strength, No tenderness, Normal gait.  Integumentary: Warm, Dry, Intact, No suspicious lesions or rashes.  Neurologic: No focal deficits, Cranial Nerves II-XII are grossly intact, Motor strength normal upper and lower extremities, Sensory exam intact.  Psychiatric: Normal interaction, Coherent speech, Euthymic mood, Appropriate affect.  Feet:? 2+ pedal pulses bilaterally.  Assessment/Plan  1.?Diabetes?E11.9  POC?A1C?10.5 not at goal.? Previous A1C?7.1.?  Refilled metformin 1000 mg BID.  Restart?glimepiride to 4 mg BID.  Increase januvia to 100 mg daily.  A1C in 1 month.  Continue medications as prescribed.  Follow ADA diet.  Avoid soda, simple sweets, and limit rice/pasta/bread/starches and consume brown options when possible.?  Maintain healthy weight with BMI goal <30.?  Perform aerobic exercise for 150 minutes per week (or 5 days a week for 30 minutes each day).?  Examine feet daily.?  Obtain annual dilated eye exam.  Eye exam: 1/24/20  Foot exam: 1/24/20  Ordered:  Clinic Follow up, *Est. 11/24/20 3:00:00 CST, Order for future visit, Diabetes  HLD (hyperlipidemia)  Benign essential hypertension   Coronary artery disease  Depression  Heart failure with reduced ejection fraction  Obesity, Class III, BMI 40-49.9 (morbid obesity...  Comprehensive Metabolic Panel, Routine collect, *Est. 11/24/20 3:00:00 CST, Blood, Order for future visit, *Est. Stop date 11/24/20 3:00:00 CST, Lab Collect, Diabetes, 08/24/20 13:28:00 CDT  Hemoglobin A1C [facility], Routine collect, *Est. 11/24/20 3:00:00 CST, Blood, Order for future visit, *Est. Stop date 11/24/20 3:00:00 CST, Lab Collect, Diabetes, 08/24/20 13:27:00 CDT  Urinalysis with Microscopic if Indicated, Routine collect, Urine, Order for future visit, *Est. 11/24/20 3:00:00 CST, *Est. Stop date 11/24/20 3:00:00 CST, Nurse collect, Diabetes  ?  2.?HLD (hyperlipidemia)?E78.5  LDL?88/ Trig?154 / HDL - 44.  Refilled fenofibrate 145 mg daily.  Continue atorvastatin 80 mg.  Encouraged to  fenofibrate as prescribed.  Follow a low cholesterol, low saturated fat diet with less than 200 mg of cholesterol a day.?  Avoid fried foods and high saturated fats (de jesus, sausage, cookies, cakes, chips, cheese, whole milk, butter, mayonnaise, creamy dressings, gravy and cream sauces).  Add flax seed or fish oil supplements to diet.?  Increase dietary fiber.?  Regular exercise improves cholesterol levels.  Physical activity 5 times a week for 30 minutes per day (or 150 minutes per week).?  Stressed importance of dietary modifications.  Ordered:  1160F- Medication reconciliation completed during visit, HLD (hyperlipidemia)  Benign essential hypertension  Coronary artery disease  Depression  Heart failure with reduced ejection fraction  Obesity, Class III, BMI 40-49.9 (morbid obesity)  Ventral hernia  Uncontrolled diabetes mellitus, [facility] Int Me...  Clinic Follow up, *Est. 11/24/20 3:00:00 CST, Order for future visit, Diabetes  HLD (hyperlipidemia)  Benign essential hypertension  Coronary artery disease  Depression  Heart failure with reduced ejection fraction  Obesity, Class III,  BMI 40-49.9 (morbid obesity...  Lipid Panel, Routine collect, *Est. 11/24/20 3:00:00 CST, Blood, Order for future visit, *Est. Stop date 11/24/20 3:00:00 CST, Lab Collect, HLD (hyperlipidemia), 08/24/20 13:28:00 CDT  Office/Outpatient Visit Level 4 Established [id number] PC, HLD (hyperlipidemia)  Benign essential hypertension  Coronary artery disease  Depression  Heart failure with reduced ejection fraction  Obesity, Class III, BMI 40-49.9 (morbid obesity)  Ventral hernia  Uncontrolled diabetes mellitus, [facility] Int Me...  ?  3.?Benign essential hypertension?I10  ?At goal.  Lisinopril d/cd per cardio.  Pt started on Entresto at last cardio visit.  Follow a low sodium (less than 2 grams of sodium per day), DASH diet.?  Continue medications as prescribed.  Monitor blood pressure and report any consistent values greater than 140/90 and keep a log.  Maintain healthy weight with a BMI goal of <30.?  Aerobic exercise for 150 minutes per week (or 5 days a week for 30 minutes each day).  Ordered:  1160F- Medication reconciliation completed during visit, HLD (hyperlipidemia)  Benign essential hypertension  Coronary artery disease  Depression  Heart failure with reduced ejection fraction  Obesity, Class III, BMI 40-49.9 (morbid obesity)  Ventral hernia  Uncontrolled diabetes mellitus, [facility] Int Me...  Clinic Follow up, *Est. 11/24/20 3:00:00 CST, Order for future visit, Diabetes  HLD (hyperlipidemia)  Benign essential hypertension  Coronary artery disease  Depression  Heart failure with reduced ejection fraction  Obesity, Class III, BMI 40-49.9 (morbid obesity...  Office/Outpatient Visit Level 4 Established [id number] PC, HLD (hyperlipidemia)  Benign essential hypertension  Coronary artery disease  Depression  Heart failure with reduced ejection fraction  Obesity, Class III, BMI 40-49.9 (morbid obesity)  Ventral hernia  Uncontrolled diabetes mellitus, [facility] Int Me...  ?  4.?Coronary artery disease?I25.10  Continue  daily Aspirin and plavix.  Keep cardio appts/diagnostics as scheduled.  Stressed importance of adequate LDL, HA1C, and BP control.  Discussed appropriate lifestyle?changes including smoking cessation, exercise, weight loss, and dietary modifications.  ED precautions reviewed including SOB, HALL, chest pain, dizziness, near syncope, palpitations,?or jaw/back/neck discomfort.?  Ordered:  1160F- Medication reconciliation completed during visit, HLD (hyperlipidemia)  Benign essential hypertension  Coronary artery disease  Depression  Heart failure with reduced ejection fraction  Obesity, Class III, BMI 40-49.9 (morbid obesity)  Ventral hernia  Uncontrolled diabetes mellitus, [facility] Int Me...  Clinic Follow up, *Est. 11/24/20 3:00:00 CST, Order for future visit, Diabetes  HLD (hyperlipidemia)  Benign essential hypertension  Coronary artery disease  Depression  Heart failure with reduced ejection fraction  Obesity, Class III, BMI 40-49.9 (morbid obesity...  Office/Outpatient Visit Level 4 Established [id number] , HLD (hyperlipidemia)  Benign essential hypertension  Coronary artery disease  Depression  Heart failure with reduced ejection fraction  Obesity, Class III, BMI 40-49.9 (morbid obesity)  Ventral hernia  Uncontrolled diabetes mellitus, [facility] Int Me...  ?  5.?Depression?F32.9  ?Refilled prozac 20 mg.  Denies SI/HI.  Read positive daily meditations, avoid negative?media, set healthy boundaries.  Exercise daily, keep consistent sleep pattern, eat?a healthy diet.  Establish good social support, make changes to reduce stress.  Do not drink alcohol or use illicit drugs.  Reports any symptoms of suicidal/homicidal ideations or self?harm?immediately, go to nearest emergency room.  Ordered:  1160F- Medication reconciliation completed during visit, HLD (hyperlipidemia)  Benign essential hypertension  Coronary artery disease  Depression  Heart failure with reduced ejection fraction  Obesity, Class III, BMI  40-49.9 (morbid obesity)  Ventral hernia  Uncontrolled diabetes mellitus, [facility] Int Me...  Clinic Follow up, *Est. 11/24/20 3:00:00 CST, Order for future visit, Diabetes  HLD (hyperlipidemia)  Benign essential hypertension  Coronary artery disease  Depression  Heart failure with reduced ejection fraction  Obesity, Class III, BMI 40-49.9 (morbid obesity...  Office/Outpatient Visit Level 4 Established [id number] PC, HLD (hyperlipidemia)  Benign essential hypertension  Coronary artery disease  Depression  Heart failure with reduced ejection fraction  Obesity, Class III, BMI 40-49.9 (morbid obesity)  Ventral hernia  Uncontrolled diabetes mellitus, [facility] Int Me...  ?  6.?Heart failure with reduced ejection fraction?I50.22  ?Continue lasix and entresto as prescribed per cardio.?  Keep?Cardiology Clinic appts as scheduled.  Last ECHO on?2/2019 showed EF of?30-35%.  Notify the clinic if you gain?3 or more pounds in one day?or 5 or more pounds in one week.  Stressed importance of daily morning weights after urination but prior to breakfast on the same scale.  Low Sodium Diet (Dash Diet - less than 2 grams of sodium per day).  Follow a?low cholesterol, low saturated fat diet with less that 200mg of cholesterol a day.  Cut down on alcohol if you have more than 1 drink a day (for women) or 2 drinks a day (for men).  Maintain healthy weight with goal BMI <30.?Perform 30 minutes of daily physical activity 5 days per week.  Report to ER for chest pain, SOB, difficulty breathing, abdominal?distention?or significant edema to lower extremities.  Ordered:  1160F- Medication reconciliation completed during visit, HLD (hyperlipidemia)  Benign essential hypertension  Coronary artery disease  Depression  Heart failure with reduced ejection fraction  Obesity, Class III, BMI 40-49.9 (morbid obesity)  Ventral hernia  Uncontrolled diabetes mellitus, [facility] Int Me...  Clinic Follow up, *Est. 11/24/20 3:00:00 CST, Order for future  visit, Diabetes  HLD (hyperlipidemia)  Benign essential hypertension  Coronary artery disease  Depression  Heart failure with reduced ejection fraction  Obesity, Class III, BMI 40-49.9 (morbid obesity...  Office/Outpatient Visit Level 4 Established [id number] PC, HLD (hyperlipidemia)  Benign essential hypertension  Coronary artery disease  Depression  Heart failure with reduced ejection fraction  Obesity, Class III, BMI 40-49.9 (morbid obesity)  Ventral hernia  Uncontrolled diabetes mellitus, [facility] Int Me...  ?  7.?Obesity, Class III, BMI 40-49.9 (morbid obesity)?E66.01  ?BMI 43. Goal BMI <30.  Refilled contrave (will begin to wean in January).  Aerobic exercise 150 minutes per week.  Avoid soda, simple sugars, sweets, excessive rice, pasta, potatoes or bread.?  Choose brown options when available and portion control.  Limit fast foods and fried foods.?  Choose complex carbs in moderation (ex: green, leafy vegetables, beans, oatmeal).  Eat plenty of fresh fruits and vegetables with lean meats daily.?  Consider permanent healthy lifestyle changes.  Ordered:  1160F- Medication reconciliation completed during visit, HLD (hyperlipidemia)  Benign essential hypertension  Coronary artery disease  Depression  Heart failure with reduced ejection fraction  Obesity, Class III, BMI 40-49.9 (morbid obesity)  Ventral hernia  Uncontrolled diabetes mellitus, [facility] Int Me...  Clinic Follow up, *Est. 11/24/20 3:00:00 CST, Order for future visit, Diabetes  HLD (hyperlipidemia)  Benign essential hypertension  Coronary artery disease  Depression  Heart failure with reduced ejection fraction  Obesity, Class III, BMI 40-49.9 (morbid obesity...  Office/Outpatient Visit Level 4 Established 42422 PC, HLD (hyperlipidemia)  Benign essential hypertension  Coronary artery disease  Depression  Heart failure with reduced ejection fraction  Obesity, Class III, BMI 40-49.9 (morbid obesity)  Ventral hernia  Uncontrolled  diabetes mellitus, [facility] Int Me...  ?  8.?Ventral hernia?K43.9  ?Encouraged to follow ADA/dash diet as instructed.  Aerobic exercise 150 minutes per week.  Will re-refer to surgery clinic if loss weight as advised per surgery clinic.  Ordered:  1160F- Medication reconciliation completed during visit, HLD (hyperlipidemia)  Benign essential hypertension  Coronary artery disease  Depression  Heart failure with reduced ejection fraction  Obesity, Class III, BMI 40-49.9 (morbid obesity)  Ventral hernia  Uncontrolled diabetes mellitus, [facility] Int Me...  Clinic Follow up, *Est. 11/24/20 3:00:00 CST, Order for future visit, Diabetes  HLD (hyperlipidemia)  Benign essential hypertension  Coronary artery disease  Depression  Heart failure with reduced ejection fraction  Obesity, Class III, BMI 40-49.9 (morbid obesity...  Office/Outpatient Visit Level 4 Established 26735 PC, HLD (hyperlipidemia)  Benign essential hypertension  Coronary artery disease  Depression  Heart failure with reduced ejection fraction  Obesity, Class III, BMI 40-49.9 (morbid obesity)  Ventral hernia  Uncontrolled diabetes mellitus, [facility] Int Me...  ?  9.?Wellness examination?Z00.00  Prostate Cancer Screening?- PSA 0.1 on 1/24/20.?Follow up?annually.  Colon Cancer Screening -?FIT negative on 1/24/20. Follow up annually.  Vaccinations: ?Flu - /?Pneumonia - /? Tetanus?- declines all vaccines  Labwork -?UTD  ?  Orders:  bupropion-naltrexone, 2 tab(s), Oral, qAM, do not crush or chew, # 120 tab(s), 0 Refill(s), Pharmacy: [facility], 168, cm, Height/Length Dosing, 08/24/20 13:35:00 CDT, 121.8, kg, Weight Dosing, 08/24/20 13:35:00 CDT  cyclobenzaprine, 10 mg = 1 tab(s), Oral, BID, # 60 tab(s), 1 Refill(s), Pharmacy: [place] [address] [id number], 168, cm, Height/Length Dosing, 08/24/20 13:35:00 CDT, 121.8, kg, Weight Dosing, 08/24/20 13:35:00 CDT  finasteride, 5 mg = 1 tab(s), Oral, Daily, # 30 tab(s), 3 Refill(s), Pharmacy: [id number], 168, cm, Height/Length Dosing, 08/24/20 13:35:00 CDT, 121.8, kg, Weight Dosing, 08/24/20 13:35:00 CDT  FLUoxetine, 20 mg = 1 cap(s), Oral, Daily, # 30 cap(s), 3 Refill(s), Pharmacy: [place] [address] [facility], 168, cm, Height/Length Dosing, 08/24/20 13:35:00 CDT, 121.8, kg, Weight Dosing, 08/24/20 13:35:00 CDT  glimepiride, 4 mg = 1 tab(s), Oral, Daily, # 30 tab(s), 3 Refill(s), Pharmacy: [place] [address] [facility], 168, cm, Height/Length Dosing, 08/24/20 13:35:00 CDT, 121.8, kg, Weight Dosing, 08/24/20 13:35:00 CDT  ibuprofen, 800 mg = 1 tab(s), Oral, BID, # 180 tab(s), 1 Refill(s), Pharmacy: [place] [address] [facility], 168, cm, Height/Length Dosing, 08/24/20 13:35:00 CDT, 121.8, kg, Weight Dosing, 08/24/20 13:35:00 CDT  metFORMIN, 1,000 mg = 1 tab(s), Oral, BID, # 60 tab(s), 3 Refill(s), Pharmacy: [id number], 168, cm, Height/Length Dosing, 08/24/20 13:35:00 CDT, 121.8, kg, Weight Dosing, 08/24/20 13:35:00 CDT  sitaGLIPtin, 50 mg = 1 tab(s), Oral, Daily, # 90 tab(s), 0 Refill(s), Pharmacy: [facility], 168, cm, Height/Length Dosing, 08/24/20 13:35:00 CDT, 121.8, kg, Weight Dosing, 08/24/20 13:35:00 CDT  Basic Metabolic Panel, Routine collect, *Est. 09/24/20 3:00:00 CDT, Blood, Order for future visit, *Est. Stop date 09/24/20 3:00:00 CDT, Lab Collect, Uncontrolled diabetes mellitus, 08/24/20 13:53:00 CDT  Hemoglobin A1C [facility], Routine collect, *Est. 09/24/20 3:00:00 CDT, Blood, Order for future visit, *Est. Stop date 09/24/20 3:00:00 CDT, Lab Collect, Uncontrolled diabetes mellitus, 08/24/20 13:53:00 CDT  Hemoglobin A1c [facility] [id number] POC, 08/24/20 13:27:00 CDT, [facility]  Will call with abn lab results.  A1C in 1 month.  RTC 3 months and prn. Labs one week prior to appt.  Referrals  Clinic Follow up, *Est. 11/23/20 14:00:00 CST, Order for future visit, Diabetes, [facility]   Problem List/Past Medical History  Ongoing  Benign essential hypertension  Coronary artery  disease  Depression  Diabetes  Heart failure with reduced ejection fraction  HLD (hyperlipidemia)  Obesity, Class III, BMI 40-49.9 (morbid obesity)  Ventral hernia  Historical  No qualifying data  Procedure/Surgical History  Dilation of Coronary Artery, One Artery with Three Drug-eluting Intraluminal Devices, Percutaneous Approach (09/25/2019)  Percutaneous transcatheter placement of drug eluting intracoronary stent(s), with coronary angioplasty when performed; single major coronary artery or branch (09/25/2019)  Catheter placement in coronary artery(s) for coronary angiography, including intraprocedural injection(s) for coronary angiography, imaging supervision and interpretation; with left heart catheterization including intraprocedural injection(s) for left dung (07/03/2019)  Fluoroscopy of Left Heart using Low Osmolar Contrast (07/03/2019)  Fluoroscopy of Multiple Coronary Arteries using Low Osmolar Contrast (07/03/2019)  Measurement of Cardiac Sampling and Pressure, Left Heart, Percutaneous Approach (07/03/2019)  Tonsillectomy (1983)   Medications  aspirin 81 mg oral Delayed Release (EC) tablet, 81 mg= 1 tab(s), Oral, Daily, 11 refills  atorvastatin 80 mg oral tablet, 80 mg= 1 tab(s), Oral, Once a day (at bedtime), 3 refills  clopidogrel 75 mg oral tablet, 75 mg= 1 tab(s), Oral, Daily, 3 refills  Contrave 8 mg-90 mg oral tablet, extended release, 2 tab(s), Oral, qAM  cyclobenzaprine 10 mg oral tablet, 10 mg= 1 tab(s), Oral, BID, 1 refills  Entresto 24 mg-26 mg oral tablet, 1 tab(s), Oral, BID, 9 refills  fenofibrate 145 mg oral tablet, 145 mg= 1 tab(s), Oral, Daily, 3 refills  finasteride 5 mg oral tablet, 5 mg= 1 tab(s), Oral, Daily, 3 refills  fluoxetine 20 mg oral capsule, 20 mg= 1 cap(s), Oral, Daily, 3 refills  glimepiride 4 mg oral tablet, 4 mg= 1 tab(s), Oral, Daily, 3 refills  Glucometer, See Instructions   mg oral tablet, 800 mg= 1 tab(s), Oral, BID, 1 refills  Januvia 100 mg oral tablet, 100 mg=  1 tab(s), Oral, Daily  Lancets and strips, See Instructions, 11 refills  Lasix 40 mg oral tablet, 40 mg= 1 tab(s), Oral, BID, 3 refills  metFORMIN 1000 mg oral tablet, 1000 mg= 1 tab(s), Oral, BID, 3 refills  metoprolol succinate 50 mg oral tablet extended release, 50 mg= 1 tab(s), Oral, Daily, 3 refills  nitroglycerin 0.4 mg sublingual TAB, 0.4 mg= 1 tab(s), SL, q5min, PRN  Allergies  No Known Medication Allergies  Social History  Abuse/Neglect  No, 08/24/2020  Alcohol  Never, 10/06/2019  Employment/School  Employed, 03/12/2019  Exercise  Home/Environment  Lives with Spouse., 03/12/2019  Nutrition/Health  Regular, 03/12/2019  Sexual  Gender Identity Identifies as male., 03/12/2019  Spiritual/Cultural  Latter day, Yes, 01/24/2020  Substance Use  Never, 10/06/2019  Tobacco  Former smoker, quit more than 30 days ago, No, 08/24/2020  Family History  Hypertension.: Mother.  Immunizations  Vaccine Date Status   influenza virus vaccine, inactivated 11/19/2019 Given   Health Maintenance  Health Maintenance  ???Pending?(in the next year)  ??? ??OverDue  ??? ? ? ?Diabetes Maintenance-Microalbumin due??and every?  ??? ? ? ?Influenza Vaccine due??and every?  ??? ? ? ?HF-LVEF due??02/27/20??and every 1??year(s)  ??? ? ? ?HF-ACEI/ARB Prescribed if Clinically Indicated due??02/27/20??and every 1??year(s)  ??? ??Due?  ??? ? ? ?HF-Ejection Fraction Past 13 Months if Hospitalized due??08/24/20??and every 1??year(s)  ??? ? ? ?HF-Fluid Restriction/Low Sodium Diet Education due??08/24/20??and every 1??year(s)  ??? ? ? ?Zoster Vaccine due??08/24/20??and every?  ??? ??Refused?  ??? ? ? ?Tetanus Vaccine due??08/24/20??and every 10??year(s)  ??? ??Due In Future?  ??? ? ? ?Obesity Screening not due until??01/01/21??and every 1??year(s)  ??? ? ? ?Alcohol Misuse Screening not due until??01/02/21??and every 1??year(s)  ??? ? ? ?Functional Assessment not due until??01/02/21??and every 1??year(s)  ??? ? ? ?Colorectal Screening not due  until??01/23/21??and every 1??year(s)  ??? ? ? ?Diabetes Maintenance-Foot Exam not due until??01/23/21??and every 1??year(s)  ??? ? ? ?Hypertension Management-Education not due until??01/24/21??and every 1??year(s)  ??? ? ? ?Hypertension Management-BMP not due until??05/19/21??and every 1??year(s)  ??? ? ? ?Diabetes Maintenance-Fasting Lipid Profile not due until??05/19/21??and every 1??year(s)  ??? ? ? ?Diabetes Maintenance-Serum Creatinine not due until??05/19/21??and every 1??year(s)  ??? ? ? ?ADL Screening not due until??05/21/21??and every 1??year(s)  ??? ? ? ?Aspirin Therapy for CVD Prevention not due until??05/21/21??and every 1??year(s)  ???Satisfied?(in the past 1 year)  ??? ??Satisfied?  ??? ? ? ?ADL Screening on??05/21/20.??Satisfied by Gabriella Howard LPN  ??? ? ? ?Alcohol Misuse Screening on??01/24/20.??Satisfied by Sadia Morillo NP  ??? ? ? ?Aspirin Therapy for CVD Prevention on??05/21/20.??Satisfied by Khalif ZAMBRANO, Stacey B  ??? ? ? ?Blood Pressure Screening on??08/24/20.??Satisfied by Gabriella Howard LPN  ??? ? ? ?Body Mass Index Check on??08/24/20.??Satisfied by Gabriella Howard LPN  ??? ? ? ?Colorectal Screening on??01/24/20.??Satisfied by Sherron Russ  ??? ? ? ?Coronary Artery Disease Maintenance-Lipid Lowering Therapy on??05/21/20.??Satisfied by Khalif ZAMBRANO, Stacey B  ??? ? ? ?Depression Screening on??08/24/20.??Satisfied by Gabriella Howard LPN  ??? ? ? ?Diabetes Maintenance-HgbA1c on??08/24/20.??Satisfied by Gabriella Howard LPN  ??? ? ? ?Diabetes Maintenance-Fasting Lipid Profile on??05/19/20.??Satisfied by Heath Dumont Jr.  ??? ? ? ?Diabetes Maintenance-Serum Creatinine on??05/19/20.??Satisfied by Heath Dumont Jr.  ??? ? ? ?Diabetes Maintenance-Eye Exam on??02/02/20.??Satisfied by Car Cuellar MD  ??? ? ? ?Diabetes Maintenance-Microalbumin on??01/24/20.??Satisfied by Marie Rabago  ??? ? ? ?Diabetes Maintenance-Foot Exam  on??01/24/20.??Satisfied by Sadia Morillo NP  ??? ? ? ?Diabetes Screening on??05/19/20.??Satisfied by Heath Dumont Jr.  ??? ? ? ?Functional Assessment on??08/24/20.??Satisfied by Gabriella Howard LPN  ??? ? ? ?HF-Beta Blocker Prescribed if Clinically Indicated on??03/11/20.??Satisfied by Xochitl Mcmahan  ??? ? ? ?Hypertension Management-Blood Pressure on??08/24/20.??Satisfied by Gabriella Howard LPN  ??? ? ? ?Hypertension Management-BMP on??05/19/20.??Satisfied by Heath Dumont Jr.  ??? ? ? ?Hypertension Management-Education on??01/24/20.??Satisfied by Sadia Morillo NP  ??? ? ? ?Influenza Vaccine on??11/19/19.??Satisfied by Alecia Burns LPN  ??? ? ? ?Lipid Screening on??05/19/20.??Satisfied by Heath Dumont Jr.  ??? ? ? ?Obesity Screening on??08/24/20.??Satisfied by Gabriella Howard LPN  ??? ??Refused?  ??? ? ? ?Tetanus Vaccine on??08/24/20.??Recorded by Gabriella Howard LPN??Reason: Patient Refuses  ??? ? ? ?Zoster Vaccine on??08/24/20.??Recorded by Gabriella Howard LPN??Reason: Patient Refuses  ?  Lab Results  Test Name Test Result Date/Time   Hgb A1C POC 10.5 08/24/2020 13:27 CDT

## 2022-05-02 NOTE — HISTORICAL [facility] CERNER
This is a historical note converted from Chris. Formatting and pictures may have been removed.  Please reference Chris for original formatting and attached multimedia. Chief Complaint  Here today for a follow up after [facility]. Patient does not wiskh to go to  for PCI. He states that he wants to have it completed here. Patient has been out of metoprolol and lisinipril for about 4 days. Needs new 90 day prescriptions. SOB with activity.  History of Present Illness  49 year old male with a past medical history of?heart failure with reduced ejection fraction, hypertension, hyperlipidemia,?and morbid obesity?presents for follow up?and ongoing care.? Patient was referred to [facility] for PCI in July?2019,?however he did not follow up stating he would like to have his PCI here at?[facility].??He denies chest pain, but continues to endorse shortness of breath with exertion. ?He also?denies palpitations, orthopnea, PND, or syncope. ?He states he has been out of his lisinopril and metoprolol for approximately 4 days and is requesting refills. ?Patient also states he developed?edema to his upper lip approximately 3 days ago.? He denies difficulty?swallowing or tongue swelling.? He states he ran out of his lisinopril prior to the start of the swelling.  ?   Left heart cath July 2, 2019:  Coronary anatomy:  Left main: No significant coronary artery disease.  LAD: Luminal irregularities  RI: No significant coronary artery disease  RCA: Dominant vessel, 50% proximal lesion and 50% distal lesion.? Luminal irregularities throughout RCA, PDA, and PLB.  Left circumflex: 80% proximal lesion.??  Review of Systems  Constitutional: negative for fever,chills, sweats, weakness, fatigue, decreased activity?????  Eye: negative  ENMT: upper lip swelling  Respiratory: HALL  Cardiovascular: negative  Gastrointestinal: negative?for nausea, vomiting, abdominal pain, constipation, diarrhea  Genitourinary: negative  Endocrine: negative  Musculoskeletal:  negative  Integumentary: negative  Neurologic: negative  Psychiatric: negative  All Other ROS: negative  Physical Exam  Vitals & Measurements  T:?36.9? ?C (Oral)? HR:?100(Peripheral)? RR:?18? BP:?148/88? SpO2:?96%? WT:?133.6?kg? WT:?133.6?kg?  General: alert and oriented/no acute distress  Eye: EOMI/normal conjunctiva  HENT: normocephalic/normal hearing/swelling noted to upper lip  Neck: supple/nontender/no carotid bruit/no JVD  Respiratory: lungs CTA/nonlabored respirations/BS equal/symmetrical expansion/no chest wall tenderness  Cardiovascular: normal rate/normal rhythm/no murmur/normal peripheral perfusion/no edema  Gastrointestinal: soft/nontender/obese abdomen  Musculoskeletal: normal ROM/normal strength  Integumentary: warm/dry/pink/intact  Neurologic: alert/oriented/normal sensory/no focal deficits  Psychiatric: cooperative/appropriate mood and affect/normal judgment  Assessment/Plan  CAD  LHC July 2019:? see results under HPI  Will schedule LHC with PCI  Risk, benefits, and alternatives of the procedure discussed with patient he wishes to proceed  Continue aspirin,?atorvastatin, and metoprolol succinate  We will hold off on resumption of lisinopril at this time due to swelling to upper lip - will plan to resume after swelling is evaluated and resolved  ?  ?   HFrEF?- EF 30-35% Feb. 2019 - NYHA Class II-III  He is euvolemic, warm, dry on exam today  Continues to endorse shortness of breath with exertion  Continue GDMT?with?Lasix and Metoprolol Succinate? - holding off?on lisinopril at this time due to swelling to upper lip  Counseled on low sodium diet  Will plan to repeat Echo after PCI - will discuss at follow up appt  ?   Hypertension  BP not at?goal -?148/88  Patient has been out of his Lisinopril and Metoprolol Succinate x 4 days - will send refill for Metoprolol Succinate?  Holding Lisinopril for now due to lip swelling  ?   Diabetes mellitus type 2  Continue management per PCP?  ?   Morbid  obesity  Counseled on diet?and exercise?  ?   Swelling to upper lip  He denies difficulty swallowing or tongue swelling  Recommended evaluation in the ER or walk in clinic  Will hold Lisinopril until after evaluation and lip swelling has resolved  Patient states swelling started 3 days ago, but he had been out of his Lisinopril x 4 days  ?  ?  Schedule LHC with PCI  Follow up in Cardiology Clinic after LHC  ?  ?   Problem List/Past Medical History  Ongoing  Abnormal nuclear stress test  Benign essential hypertension  Diabetes mellitus type 2  Heart failure with reduced ejection fraction  Historical  No qualifying data  Procedure/Surgical History  Catheter placement in coronary artery(s) for coronary angiography, including intraprocedural injection(s) for coronary angiography, imaging supervision and interpretation; with left heart catheterization including intraprocedural injection(s) for left dung (07/03/2019)  Fluoroscopy of Left Heart using Low Osmolar Contrast (07/03/2019)  Fluoroscopy of Multiple Coronary Arteries using Low Osmolar Contrast (07/03/2019)  Measurement of Cardiac Sampling and Pressure, Left Heart, Percutaneous Approach (07/03/2019)   Medications  aspirin 81 mg oral Delayed Release (EC) tablet, 81 mg= 1 tab(s), Oral, Daily, 9 refills  atorvastatin 20 mg oral tablet, 40 mg= 2 tab(s), Oral, Daily, 9 refills  cyclobenzaprine 10 mg oral tablet, 10 mg= 1 tab(s), Oral, BID  finasteride 5 mg oral tablet, 5 mg= 1 tab(s), Oral, Daily  fluoxetine 20 mg oral capsule, 20 mg= 1 cap(s), Oral, Daily  glimepiride 4 mg oral tablet, 4 mg= 1 tab(s), Oral, Daily  glyBURIDE micronized 6 mg oral tablet, 6 mg= 1 tab(s), Oral, Daily  Lasix 40 mg oral tablet, 40 mg= 1 tab(s), Oral, BID, 5 refills  lisinopril 10 mg oral tablet, 10 mg= 1 tab(s), Oral, Daily, 4 refills  metFORMIN  metoprolol succinate 50 mg oral capsule, extended release, 50 mg= 1 cap(s), Oral, Daily, 5 refills  Motrin 800 mg oral tablet, 1 tab(s), Oral,  BID  Allergies  No Known Medication Allergies  Social History  Abuse/Neglect  No, 07/03/2019  Alcohol  Never, 03/12/2019  Employment/School  Employed, 03/12/2019  Home/Environment  Lives with Spouse., 03/12/2019  Nutrition/Health  Regular, 03/12/2019  Sexual  Gender Identity Identifies as male., 03/12/2019  Substance Use  Never, 03/12/2019  Tobacco  Former smoker, quit more than 30 days ago, N/A, 07/03/2019  Health Maintenance  Health Maintenance  ???Pending?(in the next year)  ??? ??OverDue  ??? ? ? ?Alcohol Misuse Screening due??01/01/19??and every 1??year(s)  ??? ? ? ?Functional Assessment due??01/01/19??and every 1??year(s)  ??? ? ? ?Diabetes Maintenance-Urine Dipstick due??01/05/19??and every 1??year(s)  ??? ??Due?  ??? ? ? ?Colorectal Screening due??09/04/19??and every?  ??? ? ? ?Diabetes Maintenance-Eye Exam due??09/04/19??and every?  ??? ? ? ?Diabetes Maintenance-Foot Exam due??09/04/19??and every?  ??? ? ? ?HF-Ejection Fraction Past 13 Months if Hospitalized due??09/04/19??and every 1??year(s)  ??? ? ? ?HF-Fluid Restriction/Low Sodium Diet Education due??09/04/19??Variable frequency  ??? ? ? ?Hypertension Management-Education due??09/04/19??and every 1??year(s)  ??? ? ? ?Influenza Vaccine due??09/04/19??and every?  ??? ? ? ?Tetanus Vaccine due??09/04/19??and every 10??year(s)  ??? ??Due In Future?  ??? ? ? ?Obesity Screening not due until??01/01/20??and every 1??year(s)  ??? ? ? ?Diabetes Maintenance-HgbA1c not due until??02/27/20??and every 1??year(s)  ??? ? ? ?Diabetes Maintenance-Fasting Lipid Profile not due until??02/28/20??and every 1??year(s)  ??? ? ? ?Aspirin Therapy for CVD Prevention not due until??03/12/20??and every 1??year(s)  ??? ? ? ?Depression Screening not due until??04/16/20??and every 1??year(s)  ??? ? ? ?ADL Screening not due until??04/17/20??and every 1??year(s)  ??? ? ? ?Blood Pressure Screening not due until??06/03/20??and every 1??year(s)  ??? ? ? ?Hypertension Management-BMP not due  until??06/03/20??and every 1??year(s)  ??? ? ? ?Hypertension Management-Blood Pressure not due until??06/03/20??and every 1??year(s)  ??? ? ? ?HF-ACEI/ARB Prescribed if Clinically Indicated not due until??06/04/20??and every 1??year(s)  ??? ? ? ?Diabetes Maintenance-Serum Creatinine not due until??06/04/20??and every 1??year(s)  ??? ? ? ?Body Mass Index Check not due until??07/02/20??and every 1??year(s)  ??? ? ? ?HF-Heart Failure Education not due until??07/02/20??and every 1??year(s)  ???Satisfied?(in the past 1 year)  ??? ??Satisfied?  ??? ? ? ?ADL Screening on??04/17/19.??Satisfied by Radha Jaquez LPN  ??? ? ? ?Aspirin Therapy for CVD Prevention on??03/12/19.??Satisfied by Heath Cm MD  ??? ? ? ?Blood Pressure Screening on??07/03/19.??Satisfied by Colten FRENCH, Stacey D.  ??? ? ? ?Body Mass Index Check on??07/03/19.??Satisfied by Colten FRENCH, Stacey D.  ??? ? ? ?Depression Screening on??04/17/19.??Satisfied by Radha Jaquez LPN  ??? ? ? ?Diabetes Maintenance-Serum Creatinine on??06/04/19.??Satisfied by Marie Rabago  ??? ? ? ?Diabetes Maintenance-Fasting Lipid Profile on??02/28/19.??Satisfied by Rocio Vidales  ??? ? ? ?Diabetes Maintenance-HgbA1c on??02/27/19.??Satisfied by Daphne Lanier  ??? ? ? ?Diabetes Screening on??06/04/19.??Satisfied by Marie Rabago  ??? ? ? ?HF-ACEI/ARB Prescribed if Clinically Indicated on??06/04/19.??Satisfied by Lenny Mesa MD  ??? ? ? ?HF-Beta Blocker Prescribed if Clinically Indicated on??07/03/19.??Satisfied by Imtiaz Lovelace MD  ??? ? ? ?Hypertension Management-Blood Pressure on??07/03/19.??Satisfied by Colten FRENCH, Stacey BRUMFIELD.  ??? ? ? ?Hypertension Management-BMP on??06/04/19.??Satisfied by Marie Rabago  ??? ? ? ?Influenza Vaccine on??02/27/19.??Satisfied by Yanira Robertson RN  ??? ? ? ?Lipid Screening on??02/28/19.??Satisfied by Rocio Vidales  ??? ? ? ?Obesity Screening on??07/03/19.??Satisfied by Stacey Abel RN.  ??? ? ? ?Smoking Cessation  (Coronary Artery Disease) on??02/28/19.??Satisfied by Ruby Conrad LPN  ??? ? ? ?Smoking Cessation (Diabetes) on??02/28/19.??Satisfied by Ruby Conrad LPN  ?

## 2022-05-02 NOTE — HISTORICAL [facility] CERNER
This is a historical note converted from Chris. Formatting and pictures may have been removed.  Please reference Chris for original formatting and attached multimedia. Chief Complaint  Need PCP,Med refills  History of Present Illness  Pt is a?49 Years?old? Male here to establish PCP in [facility]. PMH diabetes type 2,?hypertension,?HLD, HFrEF (LVEF?30-35%;?2/27/2019), and?s/p PCI with?stent x2 (OM1 and mid circumflex; September 2019), ventral hernia, morbid obesity, depression, and tobacco abuse.?Followed by [facility] cardio clinic; was evaluated by [facility] surg clinic for hernia repair-needs to lose ~ 50 lbs prior to consideration of surgical intervention. Does not follow ADA/dash diet or exercise. Reports takes meds as prescribed. States does not have glucometer, does not know what last A1C is. Is amendable to diabetes education. States quit smoking 15?ago, onset age?16, smoked?1.5 ppd at highest point.?Declines pneumo and tetanus vaccines today. Denies chest pain, shortness of breath,?cough, fever, headache,?dizziness, weakness, abdominal pain, nausea,?vomiting, diarrhea, constipation, dysuria, depression, anxiety, SI/HI.  Review of Systems  Constitutional: negative except as stated in HPI  Eye: negative except as stated in HPI  ENT: negative except as stated in HPI  Respiratory: negative except as stated in HPI  Cardiovascular: negative except as stated in HPI  Gastrointestinal: negative except as stated in HPI  Genitourinary: negative except as stated in HPI  Heme/Lymph: negative except as stated in HPI  Endocrine: negative except as stated in HPI  Immunologic: negative except as stated in HPI  Musculoskeletal: negative except as stated in HPI  Integumentary: negative except as stated in HPI  Neurologic: negative except as stated in HPI  ?   All Other ROS_ negative except as stated in HPI  Physical Exam  Vitals & Measurements  T:?36.6? ?C (Oral)? HR:?85(Peripheral)? RR:?20? BP:?137/81?  HT:?168?cm? WT:?129.6?kg?  BMI:?45.92?  General: Alert and oriented, No acute distress.  Head: Normocephalic, Atraumatic.  Eye: Pupils are equal, round and reactive to light, Extraocular movements are intact, Normal conjunctiva, Sclera non-icteric.  Ears/Nose/Mouth/Throat: Normal hearing, Oral mucosa is moist, No pharyngeal erythema.  Neck/Thyroid: Supple, Non-tender, No lymphadenopathy, No thyromegaly, No carotid bruit, No JVD, Full range of motion.  Respiratory: Clear to auscultation bilaterally, Non-labored Respirations, Breath sounds are equal, Symmetrical chest wall expansion, No wheezes, rales or rhonchi.  Cardiovascular: Regular rate and rhythm, Normal S1/S2, No murmurs, rubs or gallops.?Normal peripheral perfusion, No edema.  Gastrointestinal: Soft, morbidly obese, Non-tender, Non-distended, Normal bowel sounds, No palpable organomegaly. Round, soft hernia to right abdomen lateral to umbilicus.  Genitourinary: No costovertebral angle tenderness, No inguinal tenderness.  Musculoskeletal: Normal range of motion, Normal strength, No tenderness, Normal gait.  Integumentary: Warm, Dry, Intact, No suspicious lesions or rashes.  Neurologic: No focal deficits, Cranial Nerves II-XII are grossly intact, Motor strength normal upper and lower extremities, Sensory exam intact.  Psychiatric: Normal interaction, Coherent speech, Euthymic mood, Appropriate affect.  Feet:? 2+ pedal pulses bilaterally.  Assessment/Plan  1.?Uncontrolled diabetes mellitus?E11.65  ?A1C?9.5 not at goal.? Previous A1C?7.9.?  D/C glyburide.  Increase glimiperide to 8 mg daily.  Refilled metformin.  Rx januvia 25 mg daily.  A1C in 1 month.  Continue medications as prescribed.  Referral to diabetes education; encouraged to keep apt when scheduled.  Rx glucometer, strips and lancets.  Encouraged to take CBG at least BID.  Follow ADA diet.  Avoid soda, simple sweets, and limit rice/pasta/bread/starches and consume brown options when possible.?  Maintain healthy weight with BMI  goal <30.?  Perform aerobic exercise for 150 minutes per week (or 5 days a week for 30 minutes each day).?  Examine feet daily.?  Obtain annual dilated eye exam.  Eye exam: 1/24/20  Foot exam: 1/24/20  Ordered:  Basic Metabolic Panel, Routine collect, *Est. 04/24/20 3:00:00 CDT, Blood, Order for future visit, *Est. Stop date 04/24/20 3:00:00 CDT, Lab Collect, Uncontrolled diabetes mellitus  Benign essential hypertension, 01/24/20 10:16:00 CST  Clinic Follow up, *Est. 03/12/20 13:40:00 CDT, Order for future visit, Obesity, Class III, BMI 40-49.9 (morbid obesity), [facility]  Hemoglobin A1C [facility], Routine collect, *Est. 04/24/20 3:00:00 CDT, Blood, Order for future visit, *Est. Stop date 04/24/20 3:00:00 CDT, Lab Collect, Uncontrolled diabetes mellitus, 01/24/20 10:16:00 CST  Hemoglobin A1C [facility], Routine collect, *Est. 02/24/20 3:00:00 CST, Blood, Order for future visit, *Est. Stop date 02/24/20 3:00:00 CST, Lab Collect, Uncontrolled diabetes mellitus, 01/24/20 10:16:00 CST  Internal Referral to Nutrition, uncontrolled diabetes, *Est. 02/24/20 3:00:00 CST, Future Visit?, Uncontrolled diabetes mellitus  HLD (hyperlipidemia)  Benign essential hypertension  Urinalysis with Microscopic if Indicated, Routine collect, Urine, Order for future visit, *Est. 04/24/20 3:00:00 CDT, *Est. Stop date 04/24/20 3:00:00 CDT, Nurse collect, Uncontrolled diabetes mellitus  Benign essential hypertension  ?  2.?Coronary artery disease?I25.10  ?Continue daily Aspirin, and plavix.  Keep cardio appts/diagnostics as scheduled.  Stressed importance of adequate LDL, HA1C, and BP control.  Discussed appropriate lifestyle?changes including smoking cessation, exercise, weight loss, and dietary modifications.  ED precautions reviewed including SOB, HALL, chest pain, dizziness, near syncope, palpitations,?or jaw/back/neck discomfort.?  Ordered:  1160F- Medication reconciliation completed during visit, Diabetes mellitus  Coronary artery disease   HLD (hyperlipidemia)  Benign essential hypertension  Heart failure with reduced ejection fraction  Ventral hernia  Obesity, Class III, BMI 40-49.9 (morbid obesity)  Wellness examination  Screening fo...  Office/Outpatient Visit Level 4 Established [id number] PC, Diabetes mellitus  Coronary artery disease  HLD (hyperlipidemia)  Benign essential hypertension  Heart failure with reduced ejection fraction  Ventral hernia  Obesity, Class III, BMI 40-49.9 (morbid obesity)  Wellness examination  Screening fo...  ?  3.?Heart failure with reduced ejection fraction?I50.22  ?Continue metoprolol and lasix.?  Keep?Cardiology Clinic appts as scheduled.  Last ECHO?2/2019 showed EF of?30-35%.  Notify the clinic if you gain?3 or more pounds in one day?or 5 or more pounds in one week.  Stressed importance of daily morning weights after urination but prior to breakfast on the same scale.  Low Sodium Diet (Dash Diet - less than 2 grams of sodium per day).  Follow a?low cholesterol, low saturated fat diet with less that 200mg of cholesterol a day.  Cut down on alcohol if you have more than 1 drink a day (for women) or 2 drinks a day (for men).  Maintain healthy weight with goal BMI <30.?Perform 30 minutes of daily physical activity 5 days per week.  Report to ER for chest pain, SOB, difficulty breathing, abdominal?distention?or significant edema to lower extremities.  Ordered:  1160F- Medication reconciliation completed during visit, Diabetes mellitus  Coronary artery disease  HLD (hyperlipidemia)  Benign essential hypertension  Heart failure with reduced ejection fraction  Ventral hernia  Obesity, Class III, BMI 40-49.9 (morbid obesity)  Wellness examination  Screening fo...  Office/Outpatient Visit Level 4 Established [id number] PC, Diabetes mellitus  Coronary artery disease  HLD (hyperlipidemia)  Benign essential hypertension  Heart failure with reduced ejection fraction  Ventral hernia  Obesity, Class III, BMI  40-49.9 (morbid obesity)  Wellness examination  Screening fo...  ?  4.?Ventral hernia?K43.9  ?Encouraged ~ 50 lbs as instructed per surgery clinic.  Keep surg clinic appts as scheduled.  ED precautions advsed.  Ordered:  1160F- Medication reconciliation completed during visit, Diabetes mellitus  Coronary artery disease  HLD (hyperlipidemia)  Benign essential hypertension  Heart failure with reduced ejection fraction  Ventral hernia  Obesity, Class III, BMI 40-49.9 (morbid obesity)  Wellness examination  Screening fo...  Office/Outpatient Visit Level 4 Established [id number] PC, Diabetes mellitus  Coronary artery disease  HLD (hyperlipidemia)  Benign essential hypertension  Heart failure with reduced ejection fraction  Ventral hernia  Obesity, Class III, BMI 40-49.9 (morbid obesity)  Wellness examination  Screening fo...  ?  5.?Obesity, Class III, BMI 40-49.9 (morbid obesity)?E66.01  ?BMI 45. Goal BMI <30.  Rx contrave as instructed.  RTC 1 month to eval.  Aerobic exercise 150 minutes per week.  Avoid soda, simple sugars, sweets, excessive rice, pasta, potatoes or bread.?  Choose brown options when available and portion control.  Limit fast foods and fried foods.?  Choose complex carbs in moderation (ex: green, leafy vegetables, beans, oatmeal).  Eat plenty of fresh fruits and vegetables with lean meats daily.?  Consider permanent healthy lifestyle changes.  Ordered:  1160F- Medication reconciliation completed during visit, Diabetes mellitus  Coronary artery disease  HLD (hyperlipidemia)  Benign essential hypertension  Heart failure with reduced ejection fraction  Ventral hernia  Obesity, Class III, BMI 40-49.9 (morbid obesity)  Wellness examination  Screening fo...  Clinic Follow up, *Est. 03/12/20 13:40:00 CDT, Order for future visit, Obesity, Class III, BMI 40-49.9 (morbid obesity), [facility]  Office/Outpatient Visit Level 4 Established [id number] PC, Diabetes mellitus  Coronary artery disease   HLD (hyperlipidemia)  Benign essential hypertension  Heart failure with reduced ejection fraction  Ventral hernia  Obesity, Class III, BMI 40-49.9 (morbid obesity)  Wellness examination  Screening fo...  ?  6.?Wellness examination?Z00.00  ?Prostate Cancer Screening?- PSA 0.1 today.?Follow up?annually.  Colon Cancer Screening -?FIT ordered. Follow up annually.  Vaccinations: ?Flu - /?Pneumonia - /? Tetanus?- declines all vaccines  Labwork -?ordered; pt is fasting.  Ordered:  1160F- Medication reconciliation completed during visit, Diabetes mellitus  Coronary artery disease  HLD (hyperlipidemia)  Benign essential hypertension  Heart failure with reduced ejection fraction  Ventral hernia  Obesity, Class III, BMI 40-49.9 (morbid obesity)  Wellness examination  Screening fo...  Office/Outpatient Visit Level 4 Established [id number] PC, Diabetes mellitus  Coronary artery disease  HLD (hyperlipidemia)  Benign essential hypertension  Heart failure with reduced ejection fraction  Ventral hernia  Obesity, Class III, BMI 40-49.9 (morbid obesity)  Wellness examination  Screening fo...  ?  7.?Screening for malignant neoplasm of colon?Z12.11  ?FIT ordered.  Ordered:  1160F- Medication reconciliation completed during visit, Diabetes mellitus  Coronary artery disease  HLD (hyperlipidemia)  Benign essential hypertension  Heart failure with reduced ejection fraction  Ventral hernia  Obesity, Class III, BMI 40-49.9 (morbid obesity)  Wellness examination  Screening fo...  Occult Blood Fecal Immunoassay, Routine collect, Stool, Order for future visit, *Est. 01/24/20 3:00:00 CST, *Est. Stop date 01/24/20 3:00:00 CST, Nurse collect, Screening for malignant neoplasm of colon  Office/Outpatient Visit Level 4 Established [id number] PC, Diabetes mellitus  Coronary artery disease  HLD (hyperlipidemia)  Benign essential hypertension  Heart failure with reduced ejection fraction  Ventral hernia  Obesity, Class III,  BMI 40-49.9 (morbid obesity)  Wellness examination  Screening fo...  ?  8.?Depression?F32.9  ?D/C prozac day before starting contrave.  Denies SI/HI.  Read positive daily meditations, avoid negative?media, set healthy boundaries.  Exercise daily, keep consistent sleep pattern, eat?a healthy diet.  Establish good social support, make changes to reduce stress.  Do not drink alcohol or use illicit drugs.  Reports any symptoms of suicidal/homicidal ideations or self?harm?immediately, go to nearest emergency room.  ?  Benign essential hypertension?I10  ?At goal.  Refilled lisinopril.  Follow a low sodium (less than 2 grams of sodium per day), DASH diet.?  Continue medications as prescribed.  Monitor blood pressure and report any consistent values greater than 140/90 and keep a log.  Encouraged?smoking cessation to aid in BP reduction and co-morbidities.?  Maintain healthy weight with a BMI goal of <30.?  Aerobic exercise for 150 minutes per week (or 5 days a week for 30 minutes each day).  Ordered:  1160F- Medication reconciliation completed during visit, Diabetes mellitus  Coronary artery disease  HLD (hyperlipidemia)  Benign essential hypertension  Heart failure with reduced ejection fraction  Ventral hernia  Obesity, Class III, BMI 40-49.9 (morbid obesity)  Wellness examination  Screening fo...  Basic Metabolic Panel, Routine collect, *Est. 04/24/20 3:00:00 CDT, Blood, Order for future visit, *Est. Stop date 04/24/20 3:00:00 CDT, Lab Collect, Uncontrolled diabetes mellitus  Benign essential hypertension, 01/24/20 10:16:00 CST  Internal Referral to Nutrition, uncontrolled diabetes, *Est. 02/24/20 3:00:00 CST, Future Visit?, Uncontrolled diabetes mellitus  HLD (hyperlipidemia)  Benign essential hypertension  Office/Outpatient Visit Level 4 Established [id number] PC, Diabetes mellitus  Coronary artery disease  HLD (hyperlipidemia)  Benign essential hypertension  Heart failure with reduced ejection fraction   Ventral hernia  Obesity, Class III, BMI 40-49.9 (morbid obesity)  Wellness examination  Screening fo...  Urinalysis with Microscopic if Indicated, Routine collect, Urine, Order for future visit, *Est. 04/24/20 3:00:00 CDT, *Est. Stop date 04/24/20 3:00:00 CDT, Nurse collect, Uncontrolled diabetes mellitus  Benign essential hypertension  ?  HLD (hyperlipidemia)?E78.5  ?LDL?unable to calc?/ Trig?627 / HDL - 35.  Increase atorvastatin to 80 mg daily at bedtime.  Rx fenofibrate 145 mg daily.  Continue statin as prescribed.?  Follow a low cholesterol, low saturated fat diet with less than 200 mg of cholesterol a day.?  Avoid fried foods and high saturated fats (de jesus, sausage, cookies, cakes, chips, cheese, whole milk, butter, mayonnaise, creamy dressings, gravy and cream sauces).  Add flax seed or fish oil supplements to diet.?  Increase dietary fiber.?  Regular exercise improves cholesterol levels.  Physical activity 5 times a week for 30 minutes per day (or 150 minutes per week).?  Stressed importance of dietary modifications.  Ordered:  1160F- Medication reconciliation completed during visit, Diabetes mellitus  Coronary artery disease  HLD (hyperlipidemia)  Benign essential hypertension  Heart failure with reduced ejection fraction  Ventral hernia  Obesity, Class III, BMI 40-49.9 (morbid obesity)  Wellness examination  Screening fo...  Internal Referral to Nutrition, uncontrolled diabetes, *Est. 02/24/20 3:00:00 CST, Future Visit?, Uncontrolled diabetes mellitus  HLD (hyperlipidemia)  Benign essential hypertension  Lipid Panel, Routine collect, *Est. 04/24/20 3:00:00 CDT, Blood, Order for future visit, *Est. Stop date 04/24/20 3:00:00 CDT, Lab Collect, HLD (hyperlipidemia), 01/24/20 10:16:00 CST  Office/Outpatient Visit Level 4 Established [id number] PC, Diabetes mellitus  Coronary artery disease  HLD (hyperlipidemia)  Benign essential hypertension  Heart failure with reduced ejection fraction  Ventral  hernia  Obesity, Class III, BMI 40-49.9 (morbid obesity)  Wellness examination  Screening fo...  ?  Orders:  atorvastatin, 80 mg = 1 tab(s), Oral, Once a day (at bedtime), # 30 tab(s), 3 Refill(s), Pharmacy: [id number], 168, cm, Height/Length Dosing, 01/24/20 9:14:00 CST, 129.6, kg, Weight Dosing, 01/24/20 9:14:00 CST  bupropion-naltrexone, 2 tab(s), Oral, BID, See instructions do not crush or chew, # 120 tab(s), 0 Refill(s), Pharmacy: [facility], 168, cm, Height/Length Dosing, 01/24/20 9:14:00 CST, 129.6, kg, Weight Dosing, 01/24/20 9:14:00 CST  fenofibrate, 145 mg = 1 tab(s), Oral, Daily, # 30 tab(s), 3 Refill(s), Pharmacy: [place] [facility] [facility], 168, cm, Height/Length Dosing, 01/24/20 9:14:00 CST, 129.6, kg, Weight Dosing, 01/24/20 9:14:00 CST  finasteride, 5 mg = 1 tab(s), Oral, Daily, # 30 tab(s), 3 Refill(s), Pharmacy: [facility], 168, cm, Height/Length Dosing, 01/24/20 9:14:00 CST, 129.6, kg, Weight Dosing, 01/24/20 9:14:00 CST  FLUoxetine, 20 mg = 1 cap(s), Oral, Daily, # 30 cap(s), 3 Refill(s), Pharmacy: [facility], 168, cm, Height/Length Dosing, 01/24/20 9:14:00 CST, 129.6, kg, Weight Dosing, 01/24/20 9:14:00 CST  glimepiride, 8 mg = 2 tab(s), Oral, Daily, with the first meal of the day, # 60 tab(s), 3 Refill(s), Pharmacy: [facility], 168, cm, Height/Length Dosing, 01/24/20 9:14:00 CST, 129.6, kg, Weight Dosing, 01/24/20 9:14:00 CST  lisinopril, 10 mg = 1 tab(s), Oral, Daily, # 30 tab(s), 1 Refill(s), Pharmacy: [facility] [facility], 168, cm, Height/Length Dosing, 01/24/20 9:14:00 CST, 129.6, kg, Weight Dosing, 01/24/20 9:14:00 CST  metFORMIN, 1,000 mg = 1 tab(s), Oral, BID, # 60 tab(s), 3 Refill(s), Pharmacy: [facility], 168, cm, Height/Length Dosing, 01/24/20 9:14:00 CST, 129.6, kg, Weight Dosing, 01/24/20 9:14:00 CST  Misc Prescription, Glucometer, See Instructions, Use to check CBG TID, # 1 EA, 0 Refill(s)  Misc Prescription,  Lancets and strips, See Instructions, Use to check CBGs TID, # 100 EA, 11 Refill(s)  sitaGLIPtin, 25 mg = 1 tab(s), Oral, Daily, # 90 tab(s), 1 Refill(s), Pharmacy: [facility], 168, cm, Height/Length Dosing, 01/24/20 9:14:00 CST, 129.6, kg, Weight Dosing, 01/24/20 9:14:00 CST  Will call with lab results.  RTC 1 month with A1C.  RTC 3 months and prn. Labs one week prior to appt.  Referrals  Internal Referral to Nutrition, uncontrolled diabetes, *Est. 02/24/20 3:00:00 CST, Future Visit?, Uncontrolled diabetes mellitus  HLD (hyperlipidemia)  Benign essential hypertension  [facility] Internal Referral to [facility], Specialty: Ophthalmology, Reason: diabetes, Start: 01/24/20 9:30:00 CST  Clinic Follow up, *Est. 03/12/20 13:40:00 CDT, Order for future visit, Obesity, Class III, BMI 40-49.9 (morbid obesity), [facility]   Problem List/Past Medical History  Ongoing  Benign essential hypertension  Coronary artery disease  Heart failure with reduced ejection fraction  HLD (hyperlipidemia)  Obesity, Class III, BMI 40-49.9 (morbid obesity)  Uncontrolled diabetes mellitus  Ventral hernia  Historical  No qualifying data  Procedure/Surgical History  Dilation of Coronary Artery, One Artery with Three Drug-eluting Intraluminal Devices, Percutaneous Approach (09/25/2019)  Percutaneous transcatheter placement of drug eluting intracoronary stent(s), with coronary angioplasty when performed; single major coronary artery or branch (09/25/2019)  Catheter placement in coronary artery(s) for coronary angiography, including intraprocedural injection(s) for coronary angiography, imaging supervision and interpretation; with left heart catheterization including intraprocedural injection(s) for left dung (07/03/2019)  Fluoroscopy of Left Heart using Low Osmolar Contrast (07/03/2019)  Fluoroscopy of Multiple Coronary Arteries using Low Osmolar Contrast (07/03/2019)  Measurement of Cardiac Sampling and Pressure, Left  Heart, Percutaneous Approach (07/03/2019)  Tonsillectomy (1983)   Medications  aspirin 81 mg oral Delayed Release (EC) tablet, 81 mg= 1 tab(s), Oral, Daily, 11 refills  atorvastatin 80 mg oral tablet, 80 mg= 1 tab(s), Oral, Once a day (at bedtime), 3 refills  clopidogrel 75 mg oral tablet, 75 mg= 1 tab(s), Oral, Daily, 11 refills  Contrave 8 mg-90 mg oral tablet, extended release, 2 tab(s), Oral, BID  cyclobenzaprine 10 mg oral tablet, 10 mg= 1 tab(s), Oral, BID  fenofibrate 145 mg oral tablet, 145 mg= 1 tab(s), Oral, Daily, 3 refills  finasteride 5 mg oral tablet, 5 mg= 1 tab(s), Oral, Daily, 3 refills  fluoxetine 20 mg oral capsule, 20 mg= 1 cap(s), Oral, Daily, 3 refills  glimepiride 4 mg oral tablet, 8 mg= 2 tab(s), Oral, Daily, 3 refills  Glucometer, See Instructions  Januvia 25 mg oral tablet, 25 mg= 1 tab(s), Oral, Daily, 1 refills  Lancets and strips, See Instructions, 11 refills  Lasix 40 mg oral tablet, 40 mg= 1 tab(s), Oral, BID, 6 refills  lisinopril 10 mg oral tablet, 10 mg= 1 tab(s), Oral, Daily, 1 refills  metFORMIN 1000 mg oral tablet, 1000 mg= 1 tab(s), Oral, BID, 3 refills  metoprolol succinate 50 mg oral tablet extended release, 50 mg= 1 tab(s), Oral, Daily, 3 refills  Motrin 800 mg oral tablet, 1 tab(s), Oral, BID  nitroglycerin 0.4 mg sublingual TAB, 0.4 mg= 1 tab(s), SL, q5min, PRN  Allergies  No Known Medication Allergies  Social History  Abuse/Neglect  No, No, Yes, 01/24/2020  Alcohol  Never, 10/06/2019  Employment/School  Employed, 03/12/2019  Exercise  Home/Environment  Lives with Spouse., 03/12/2019  Nutrition/Health  Regular, 03/12/2019  Sexual  Gender Identity Identifies as male., 03/12/2019  Spiritual/Cultural  Voodoo, Yes, 01/24/2020  Substance Use  Never, 10/06/2019  Tobacco  Former smoker, quit more than 30 days ago, Cigarettes, No, 01/24/2020  Family History  Hypertension.: Mother.  Immunizations  Vaccine Date Status   influenza virus vaccine, inactivated 11/19/2019 Given   Health  Maintenance  Health Maintenance  ???Pending?(in the next year)  ??? ??OverDue  ??? ? ? ?Coronary Artery Disease Maintenance-Antiplatelet Agent Prescribed due??and every?  ??? ? ? ?Coronary Artery Disease Maintenance-Lipid Lowering Therapy due??and every?  ??? ??Due?  ??? ? ? ?Functional Assessment due??01/01/20??and every 1??year(s)  ??? ? ? ?Colorectal Screening due??01/24/20??and every?  ??? ? ? ?Diabetes Maintenance-Eye Exam due??01/24/20??and every?  ??? ? ? ?HF-Ejection Fraction Past 13 Months if Hospitalized due??01/24/20??and every 1??year(s)  ??? ? ? ?HF-Fluid Restriction/Low Sodium Diet Education due??01/24/20??Variable frequency  ??? ??Refused?  ??? ? ? ?Tetanus Vaccine due??01/24/20??and every 10??year(s)  ??? ??Due In Future?  ??? ? ? ?Aspirin Therapy for CVD Prevention not due until??09/26/20??and every 1??year(s)  ??? ? ? ?ADL Screening not due until??10/21/20??and every 1??year(s)  ??? ? ? ?Alcohol Misuse Screening not due until??01/01/21??and every 1??year(s)  ??? ? ? ?Obesity Screening not due until??01/01/21??and every 1??year(s)  ??? ? ? ?Blood Pressure Screening not due until??01/23/21??and every 1??year(s)  ??? ? ? ?Body Mass Index Check not due until??01/23/21??and every 1??year(s)  ??? ? ? ?Diabetes Maintenance-Fasting Lipid Profile not due until??01/23/21??and every 1??year(s)  ??? ? ? ?Diabetes Maintenance-Foot Exam not due until??01/23/21??and every 1??year(s)  ??? ? ? ?Diabetes Maintenance-HgbA1c not due until??01/23/21??and every 1??year(s)  ??? ? ? ?HF-Heart Failure Education not due until??01/23/21??and every 1??year(s)  ??? ? ? ?Hypertension Management-BMP not due until??01/23/21??and every 1??year(s)  ??? ? ? ?Hypertension Management-Blood Pressure not due until??01/23/21??and every 1??year(s)  ???Satisfied?(in the past 1 year)  ??? ??Satisfied?  ??? ? ? ?ADL Screening on??10/21/19.??Satisfied by Radha Jaquez LPN  ??? ? ? ?Alcohol Misuse Screening on??01/24/20.??Satisfied by Ilia  Sadia WASHINGTON  ??? ? ? ?Aspirin Therapy for CVD Prevention on??09/26/19.??Satisfied by Susanne Grider RN  ??? ? ? ?Blood Pressure Screening on??01/24/20.??Satisfied by Matilda Cat LPN S.  ??? ? ? ?Body Mass Index Check on??01/24/20.??Satisfied by Day Matilda BUCK S.  ??? ? ? ?Coronary Artery Disease Maintenance-Lipid Lowering Therapy on??01/24/20.??Satisfied by Sadia Morillo NP  ??? ? ? ?Depression Screening on??10/21/19.??Satisfied by Radha Jaquez LPN  ??? ? ? ?Diabetes Maintenance-Fasting Lipid Profile on??01/24/20.??Satisfied by Marie Rabago  ??? ? ? ?Diabetes Maintenance-Serum Creatinine on??01/24/20.??Satisfied by Marie Rabago  ??? ? ? ?Diabetes Maintenance-Urine Dipstick on??01/24/20.??Satisfied by Roxy Moeller  ??? ? ? ?Diabetes Maintenance-Foot Exam on??01/24/20.??Satisfied by Sadia Morillo NP  ??? ? ? ?Diabetes Maintenance-HgbA1c on??01/24/20.??Satisfied by Matilda Cat LPN SYamilet  ??? ? ? ?Diabetes Screening on??01/24/20.??Satisfied by Marie Rabago  ??? ? ? ?HF-ACEI/ARB Prescribed if Clinically Indicated on??01/24/20.??Satisfied by Sadia Morillo NP  ??? ? ? ?HF-Beta Blocker Prescribed if Clinically Indicated on??09/12/19.??Satisfied by Xochitl Mcmahan  ??? ? ? ?Hypertension Management-BMP on??01/24/20.??Satisfied by Marie Rabago  ??? ? ? ?Hypertension Management-Education on??01/24/20.??Satisfied by Sadia Morillo NP  ??? ? ? ?Hypertension Management-Blood Pressure on??01/24/20.??Satisfied by Matilda Cat LPN.  ??? ? ? ?Influenza Vaccine on??11/19/19.??Satisfied by Alecia Burns LPN  ??? ? ? ?Lipid Screening on??01/24/20.??Satisfied by Marie Rabago  ??? ? ? ?Obesity Screening on??01/24/20.??Satisfied by Matilda Cat LPN  ??? ? ? ?Smoking Cessation (Coronary Artery Disease) on??02/28/19.??Satisfied by Ruby Conrad LPN  ??? ? ? ?Smoking Cessation (Diabetes) on??02/28/19.??Satisfied by Ruby Conrad LPN  ??? ??Refused?  ???  ? ? ?Tetanus Vaccine on??01/24/20.??Recorded by Day Matilda BUCK??Reason: Patient Refuses  ?  Lab Results  Test Name Test Result Date/Time Comments   Sodium Lvl 135 mmol/L (Low) 01/24/2020 11:26 CST    Potassium Lvl 3.9 mmol/L 01/24/2020 11:26 CST    Chloride 98 mmol/L 01/24/2020 11:26 CST    CO2 32 mmol/L 01/24/2020 11:26 CST    Calcium Lvl 9.1 mg/dL 01/24/2020 11:26 CST    Glucose Lvl 272 mg/dL (High) 01/24/2020 11:26 CST    BUN 17 mg/dL 01/24/2020 11:26 CST    Creatinine 0.80 mg/dL 01/24/2020 11:26 CST    eGFR-JEREMY >105 mL/min 01/24/2020 11:26 CST    Bili Total 0.7 mg/dL 01/24/2020 11:26 CST    Bili Direct 0.2 mg/dL 01/24/2020 11:26 CST    Bili Indirect 0.5 mg/dL 01/24/2020 11:26 CST    AST 27 unit/L 01/24/2020 11:26 CST    ALT 68 unit/L 01/24/2020 11:26 CST    Alk Phos 101 unit/L 01/24/2020 11:26 CST    Total Protein 7.9 gm/dL 01/24/2020 11:26 CST    Albumin Lvl 3.8 gm/dL 01/24/2020 11:26 CST    Globulin 4.10 gm/mL (High) 01/24/2020 11:26 CST    A/G Ratio 0.9 ratio (Low) 01/24/2020 11:26 CST    Hgb A1C POC 9.5% 01/24/2020 09:27 CST    PSA 0.1 ng/mL 01/24/2020 11:26 CST    Chol 188 mg/dL 01/24/2020 11:26 CST    HDL 35 mg/dL (Low) 01/24/2020 11:26 CST    Trig 627 mg/dL (High) 01/24/2020 11:26 CST    LDL See Comment. 01/24/2020 11:26 CST Calculated LDL not valid with Triglyceride > 400.   Chol/HDL 5.4 (High) 01/24/2020 11:26 CST    VLDL See Comment. 01/24/2020 11:26 CST Calculated VLDL not valid with Triglyceride > 400.   TSH 1.190 mIU/L 01/24/2020 11:26 CST    U Creatinine 27.0 mg/dL 01/24/2020 11:26 CST No established reference range available   U Microalb 6.1 mg/L 01/24/2020 11:26 CST    Microalb/Creat 22.6 mcg/mg Cr 01/24/2020 11:26 CST    WBC 7.8 x10(3)/mcL 01/24/2020 11:26 CST    RBC 4.93 x10(6)/mcL 01/24/2020 11:26 CST    Hgb 15.4 gm/dL 01/24/2020 11:26 CST    Hct 45.9 % 01/24/2020 11:26 CST    Platelet 223 x10(3)/mcL 01/24/2020 11:26 CST    MCV 93.1 fL 01/24/2020 11:26 CST    MCH 31.2 pg 01/24/2020 11:26  CST    MCHC 33.6 gm/dL 01/24/2020 11:26 CST    RDW 12.7 % 01/24/2020 11:26 CST    MPV 10.4 fL 01/24/2020 11:26 CST    Abs Neut 4.96 x10(3)/mcL 01/24/2020 11:26 CST    Neutro Auto 64 % 01/24/2020 11:26 CST    Lymph Auto 20 % 01/24/2020 11:26 CST    Mono Auto 11 % 01/24/2020 11:26 CST    Eos Auto 5 % 01/24/2020 11:26 CST    Abs Eos 0.4 x10(3)/mcL 01/24/2020 11:26 CST    Basophil Auto 0 % 01/24/2020 11:26 CST    Abs Neutro 4.96 x10(3)/mcL 01/24/2020 11:26 CST    Abs Lymph 1.5 x10(3)/mcL 01/24/2020 11:26 CST    Abs Mono 0.8 x10(3)/[facility] 01/24/2020 11:26 CST    Abs Baso 0.0 x10(3)/[facility] 01/24/2020 11:26 CST    IG% 0 % 01/24/2020 11:26 CST    IG# 0.0400 01/24/2020 11:26 CST    UA Appear Clear 01/24/2020 11:26 CST    UA Color LIGHT YELLOW 01/24/2020 11:26 CST    UA Spec Grav 1.007 01/24/2020 11:26 CST    UA Bili Negative 01/24/2020 11:26 CST    UA pH 5.0 01/24/2020 11:26 CST    UA Urobilinogen Normal 01/24/2020 11:26 CST    UA Blood Negative 01/24/2020 11:26 CST    UA Glucose 200 (Abnormal) 01/24/2020 11:26 CST    UA Ketones Negative 01/24/2020 11:26 CST    UA Protein Negative 01/24/2020 11:26 CST    UA Nitrite Negative 01/24/2020 11:26 CST    UA Leuk Est 75 (Abnormal) 01/24/2020 11:26 CST    UA WBC Interp 0-2 01/24/2020 11:26 CST    UA RBC Interp 0-2 01/24/2020 11:26 CST    UA Bact Interp None Seen 01/24/2020 11:26 CST    UA Squam Epi Interp  (Abnormal) 01/24/2020 11:26 CST    UA Hyal Cast Interp 0-2 (Abnormal) 01/24/2020 11:26 CST

## 2022-08-11 ENCOUNTER — TELEPHONE (OUTPATIENT)
Dept: INTERNAL MEDICINE | Facility: CLINIC | Age: 52
End: 2022-08-11

## 2022-08-11 NOTE — TELEPHONE ENCOUNTER
----- Message from Tracee Ernst sent at 8/4/2022  2:36 PM CDT -----  Regarding: labs/Patience Ilia  Patient appt 9/1 will need labs reorder.

## 2022-08-29 ENCOUNTER — LAB VISIT (OUTPATIENT)
Dept: LAB | Facility: HOSPITAL | Age: 52
End: 2022-08-29
Attending: NURSE PRACTITIONER

## 2022-08-29 DIAGNOSIS — E11.65 TYPE 2 DIABETES MELLITUS WITH HYPERGLYCEMIA, WITHOUT LONG-TERM CURRENT USE OF INSULIN: ICD-10-CM

## 2022-08-29 LAB
ALBUMIN SERPL-MCNC: 4.1 GM/DL (ref 3.5–5)
ALBUMIN/GLOB SERPL: 1.2 RATIO (ref 1.1–2)
ALP SERPL-CCNC: 61 UNIT/L (ref 40–150)
ALT SERPL-CCNC: 30 UNIT/L (ref 0–55)
AST SERPL-CCNC: 23 UNIT/L (ref 5–34)
BASOPHILS # BLD AUTO: 0.03 X10(3)/MCL (ref 0–0.2)
BASOPHILS NFR BLD AUTO: 0.3 %
BILIRUBIN DIRECT+TOT PNL SERPL-MCNC: 0.6 MG/DL
BUN SERPL-MCNC: 26.7 MG/DL (ref 8.4–25.7)
CALCIUM SERPL-MCNC: 10.4 MG/DL (ref 8.4–10.2)
CHLORIDE SERPL-SCNC: 102 MMOL/L (ref 98–107)
CO2 SERPL-SCNC: 27 MMOL/L (ref 22–29)
CREAT SERPL-MCNC: 1.27 MG/DL (ref 0.73–1.18)
EOSINOPHIL # BLD AUTO: 0.53 X10(3)/MCL (ref 0–0.9)
EOSINOPHIL NFR BLD AUTO: 6.1 %
ERYTHROCYTE [DISTWIDTH] IN BLOOD BY AUTOMATED COUNT: 12.4 % (ref 11.5–17)
EST. AVERAGE GLUCOSE BLD GHB EST-MCNC: 191.5 MG/DL
GFR SERPLBLD CREATININE-BSD FMLA CKD-EPI: >60 MLS/MIN/1.73/M2
GLOBULIN SER-MCNC: 3.3 GM/DL (ref 2.4–3.5)
GLUCOSE SERPL-MCNC: 235 MG/DL (ref 74–100)
HBA1C MFR BLD: 8.3 %
HCT VFR BLD AUTO: 38.5 % (ref 42–52)
HGB BLD-MCNC: 13.6 GM/DL (ref 14–18)
IMM GRANULOCYTES # BLD AUTO: 0.03 X10(3)/MCL (ref 0–0.04)
IMM GRANULOCYTES NFR BLD AUTO: 0.3 %
LYMPHOCYTES # BLD AUTO: 1.71 X10(3)/MCL (ref 0.6–4.6)
LYMPHOCYTES NFR BLD AUTO: 19.6 %
MCH RBC QN AUTO: 31.9 PG (ref 27–31)
MCHC RBC AUTO-ENTMCNC: 35.3 MG/DL (ref 33–36)
MCV RBC AUTO: 90.2 FL (ref 80–94)
MONOCYTES # BLD AUTO: 0.95 X10(3)/MCL (ref 0.1–1.3)
MONOCYTES NFR BLD AUTO: 10.9 %
NEUTROPHILS # BLD AUTO: 5.5 X10(3)/MCL (ref 2.1–9.2)
NEUTROPHILS NFR BLD AUTO: 62.8 %
NRBC BLD AUTO-RTO: 0 %
PLATELET # BLD AUTO: 309 X10(3)/MCL (ref 130–400)
PMV BLD AUTO: 10.9 FL (ref 7.4–10.4)
POTASSIUM SERPL-SCNC: 4.7 MMOL/L (ref 3.5–5.1)
PROT SERPL-MCNC: 7.4 GM/DL (ref 6.4–8.3)
RBC # BLD AUTO: 4.27 X10(6)/MCL (ref 4.7–6.1)
SODIUM SERPL-SCNC: 140 MMOL/L (ref 136–145)
WBC # SPEC AUTO: 8.7 X10(3)/MCL (ref 4.5–11.5)

## 2022-08-29 PROCEDURE — 80053 COMPREHEN METABOLIC PANEL: CPT

## 2022-08-29 PROCEDURE — 83036 HEMOGLOBIN GLYCOSYLATED A1C: CPT

## 2022-08-29 PROCEDURE — 85025 COMPLETE CBC W/AUTO DIFF WBC: CPT

## 2022-08-29 PROCEDURE — 36415 COLL VENOUS BLD VENIPUNCTURE: CPT

## 2022-09-01 ENCOUNTER — OFFICE VISIT (OUTPATIENT)
Dept: INTERNAL MEDICINE | Facility: CLINIC | Age: 52
End: 2022-09-01

## 2022-09-01 VITALS
BODY MASS INDEX: 45.62 KG/M2 | TEMPERATURE: 98 F | HEART RATE: 83 BPM | RESPIRATION RATE: 18 BRPM | WEIGHT: 273.81 LBS | DIASTOLIC BLOOD PRESSURE: 66 MMHG | SYSTOLIC BLOOD PRESSURE: 104 MMHG | HEIGHT: 65 IN

## 2022-09-01 DIAGNOSIS — S90.415A ABRASION OF TOE OF LEFT FOOT, INITIAL ENCOUNTER: ICD-10-CM

## 2022-09-01 DIAGNOSIS — Z12.11 ENCOUNTER FOR SCREENING FOR MALIGNANT NEOPLASM OF COLON: ICD-10-CM

## 2022-09-01 DIAGNOSIS — I10 BENIGN ESSENTIAL HYPERTENSION: ICD-10-CM

## 2022-09-01 DIAGNOSIS — S90.412A ABRASION OF SKIN OF LEFT GREAT TOE: ICD-10-CM

## 2022-09-01 DIAGNOSIS — F32.A DEPRESSIVE DISORDER: Primary | ICD-10-CM

## 2022-09-01 DIAGNOSIS — I50.22 CHRONIC SYSTOLIC HEART FAILURE: ICD-10-CM

## 2022-09-01 DIAGNOSIS — E78.2 MIXED HYPERLIPIDEMIA: ICD-10-CM

## 2022-09-01 DIAGNOSIS — E66.01 MORBID OBESITY: ICD-10-CM

## 2022-09-01 DIAGNOSIS — I25.10 ARTERIOSCLEROSIS OF CORONARY ARTERY: ICD-10-CM

## 2022-09-01 DIAGNOSIS — E11.65 UNCONTROLLED TYPE 2 DIABETES MELLITUS WITH HYPERGLYCEMIA: ICD-10-CM

## 2022-09-01 PROBLEM — E11.9 DIABETES MELLITUS: Status: ACTIVE | Noted: 2022-09-01

## 2022-09-01 PROBLEM — K43.9 HERNIA OF ANTERIOR ABDOMINAL WALL: Status: ACTIVE | Noted: 2022-09-01

## 2022-09-01 PROBLEM — E78.5 HYPERLIPIDEMIA: Status: ACTIVE | Noted: 2022-09-01

## 2022-09-01 PROBLEM — G62.9 NEUROPATHY: Status: ACTIVE | Noted: 2022-09-01

## 2022-09-01 PROCEDURE — 99214 OFFICE O/P EST MOD 30 MIN: CPT | Mod: PBBFAC | Performed by: NURSE PRACTITIONER

## 2022-09-01 PROCEDURE — 99214 PR OFFICE/OUTPT VISIT, EST, LEVL IV, 30-39 MIN: ICD-10-PCS | Mod: S$PBB,,, | Performed by: NURSE PRACTITIONER

## 2022-09-01 PROCEDURE — 99214 OFFICE O/P EST MOD 30 MIN: CPT | Mod: S$PBB,,, | Performed by: NURSE PRACTITIONER

## 2022-09-01 RX ORDER — FENOFIBRATE 145 MG/1
145 TABLET, FILM COATED ORAL DAILY
COMMUNITY
Start: 2022-07-15 | End: 2022-09-01 | Stop reason: SDUPTHER

## 2022-09-01 RX ORDER — METOPROLOL SUCCINATE 50 MG/1
50 TABLET, EXTENDED RELEASE ORAL DAILY
COMMUNITY
Start: 2022-03-17 | End: 2022-12-22 | Stop reason: SDUPTHER

## 2022-09-01 RX ORDER — FLUOXETINE HYDROCHLORIDE 20 MG/1
20 CAPSULE ORAL
COMMUNITY
Start: 2021-06-17 | End: 2022-09-01 | Stop reason: SDUPTHER

## 2022-09-01 RX ORDER — ATORVASTATIN CALCIUM 80 MG/1
80 TABLET, FILM COATED ORAL DAILY
COMMUNITY
End: 2022-12-22 | Stop reason: SDUPTHER

## 2022-09-01 RX ORDER — ASPIRIN 81 MG/1
81 TABLET ORAL
COMMUNITY
Start: 2021-06-17 | End: 2023-06-15 | Stop reason: SDUPTHER

## 2022-09-01 RX ORDER — METFORMIN HYDROCHLORIDE 1000 MG/1
1000 TABLET ORAL
COMMUNITY
Start: 2021-06-17 | End: 2022-09-01 | Stop reason: SDUPTHER

## 2022-09-01 RX ORDER — SITAGLIPTIN 100 MG/1
100 TABLET, FILM COATED ORAL DAILY
Qty: 90 TABLET | Refills: 1 | Status: SHIPPED | OUTPATIENT
Start: 2022-09-01 | End: 2022-12-22 | Stop reason: SDUPTHER

## 2022-09-01 RX ORDER — FINASTERIDE 5 MG/1
5 TABLET, FILM COATED ORAL
COMMUNITY
Start: 2022-02-02 | End: 2023-02-01

## 2022-09-01 RX ORDER — FENOFIBRATE 145 MG/1
145 TABLET, FILM COATED ORAL DAILY
Qty: 180 TABLET | Refills: 1 | Status: SHIPPED | OUTPATIENT
Start: 2022-09-01 | End: 2022-09-01 | Stop reason: SDUPTHER

## 2022-09-01 RX ORDER — FLUOXETINE HYDROCHLORIDE 20 MG/1
20 CAPSULE ORAL DAILY
Qty: 90 CAPSULE | Refills: 1 | Status: SHIPPED | OUTPATIENT
Start: 2022-09-01 | End: 2022-12-22 | Stop reason: SDUPTHER

## 2022-09-01 RX ORDER — SACUBITRIL AND VALSARTAN 97; 103 MG/1; MG/1
1 TABLET, FILM COATED ORAL 2 TIMES DAILY
COMMUNITY
End: 2023-02-09 | Stop reason: SDUPTHER

## 2022-09-01 RX ORDER — EZETIMIBE 10 MG/1
10 TABLET ORAL DAILY
Qty: 90 TABLET | Refills: 1 | Status: SHIPPED | OUTPATIENT
Start: 2022-09-01 | End: 2022-12-22 | Stop reason: SDUPTHER

## 2022-09-01 RX ORDER — GLIMEPIRIDE 4 MG/1
4 TABLET ORAL
COMMUNITY
Start: 2021-06-17 | End: 2022-09-01 | Stop reason: SDUPTHER

## 2022-09-01 RX ORDER — EZETIMIBE 10 MG/1
10 TABLET ORAL
COMMUNITY
Start: 2021-12-20 | End: 2022-09-01 | Stop reason: SDUPTHER

## 2022-09-01 RX ORDER — CLOPIDOGREL BISULFATE 75 MG/1
75 TABLET ORAL
COMMUNITY
Start: 2021-06-21 | End: 2023-05-22 | Stop reason: SDUPTHER

## 2022-09-01 RX ORDER — GLIMEPIRIDE 4 MG/1
4 TABLET ORAL 2 TIMES DAILY WITH MEALS
Qty: 180 TABLET | Refills: 1 | Status: SHIPPED | OUTPATIENT
Start: 2022-09-01 | End: 2022-12-22 | Stop reason: SDUPTHER

## 2022-09-01 RX ORDER — SITAGLIPTIN 100 MG/1
100 TABLET, FILM COATED ORAL DAILY
Qty: 90 TABLET | Refills: 1 | Status: SHIPPED | OUTPATIENT
Start: 2022-09-01 | End: 2022-09-01 | Stop reason: SDUPTHER

## 2022-09-01 RX ORDER — METFORMIN HYDROCHLORIDE 1000 MG/1
1000 TABLET ORAL 2 TIMES DAILY WITH MEALS
Qty: 180 TABLET | Refills: 1 | Status: SHIPPED | OUTPATIENT
Start: 2022-09-01 | End: 2023-05-15 | Stop reason: SDUPTHER

## 2022-09-01 RX ORDER — FENOFIBRATE 145 MG/1
145 TABLET, FILM COATED ORAL DAILY
Qty: 180 TABLET | Refills: 1 | Status: SHIPPED | OUTPATIENT
Start: 2022-09-01 | End: 2022-12-22 | Stop reason: SDUPTHER

## 2022-09-01 NOTE — PROGRESS NOTES
CECILE Rainey   [facility]  [address] [facility] [address]      PATIENT NAME: Mynor Recio  : 1970  DATE: 22  MRN: [id number]      Billing Provider: CECILE Rainey  Level of Service: IN OFFICE/OUTPT VISIT, EST, LEVL IV, 30-39 MIN  Patient PCP Information       Provider PCP Type    CECILE Rainey General            Reason for Visit / Chief Complaint: Follow-up (Lab results, needs refills)       History of Present Illness / Problem Focused Workflow     Mynor Recio is a 52 y.o. White male presents to the clinic for DM f/u. PMH diabetes, hypertension, HLD, HFrEF (LVEF 30-35%; 2019), and s/p PCI with stent x2 (OM1 and mid circumflex; 2019), ventral hernia, morbid obesity, depression, and tobacco abuse. Followed by [facility] cardio clinic. Reports med compliance. Reports has resumed drinking sodas daily; has not been checking CBGs. Reports has a sore and his big toe that has been bothering him. Does not follow ADA/dash diet or exercise. Labs reviewed with pt. Denies chest pain, shortness of breath, cough, fever, headache, dizziness, weakness, abdominal pain, nausea, vomiting, diarrhea, constipation, dysuria, depression, anxiety, SI/HI.      Review of Systems     Review of Systems   Constitutional: Negative.    HENT: Negative.     Eyes: Negative.    Respiratory: Negative.     Cardiovascular: Negative.    Gastrointestinal: Negative.    Endocrine: Negative.    Genitourinary: Negative.    Musculoskeletal: Negative.    Integumentary:         Positive for sore to left great toe Negative.   Neurological: Negative.    Psychiatric/Behavioral: Negative.        Medical / Social / Family History     Past Medical History:   Diagnosis Date    Diabetes mellitus, type 2     Hyperlipidemia     Hypertension        Past Surgical History:   Procedure Laterality Date    TONSILLECTOMY         Social History  Mr. Recio  reports that he has never smoked. He has never used smokeless tobacco. He reports that he does not drink alcohol and does not use drugs.    Family History  's Family history is unknown by patient.    Medications and Allergies     Medications  Medication List with Changes/Refills   Current Medications    ASPIRIN (ECOTRIN) 81 MG EC TABLET    Take 81 mg by mouth.    ATORVASTATIN (LIPITOR) 80 MG TABLET    Take 80 mg by mouth once daily.    CLOPIDOGREL (PLAVIX) 75 MG TABLET    Take 75 mg by mouth.    CYCLOBENZAPRINE (FLEXERIL) 10 MG TABLET    TAKE ONE TABLET BY MOUTH TWICE A DAY    FINASTERIDE (PROSCAR) 5 MG TABLET    Take 5 mg by mouth.    IBUPROFEN (ADVIL,MOTRIN) 800 MG TABLET    TAKE ONE TABLET BY MOUTH TWICE A DAY AS NEEDED FOR PAIN    METOPROLOL SUCCINATE (TOPROL-XL) 50 MG 24 HR TABLET    Take 50 mg by mouth once daily.    SACUBITRIL-VALSARTAN (ENTRESTO)  MG PER TABLET    Take 1 tablet by mouth 2 (two) times daily.   Changed and/or Refilled Medications    Modified Medication Previous Medication    EZETIMIBE (ZETIA) 10 MG TABLET ezetimibe (ZETIA) 10 mg tablet       Take 1 tablet (10 mg total) by mouth once daily.    Take 10 mg by mouth.    FENOFIBRATE (TRICOR) 145 MG TABLET fenofibrate (TRICOR) 145 MG tablet       Take 1 tablet (145 mg total) by mouth once daily.    Take 145 mg by mouth once daily.    FLUOXETINE 20 MG CAPSULE FLUoxetine 20 MG capsule       Take 1 capsule (20 mg total) by mouth once daily.    Take 20 mg by mouth.    GLIMEPIRIDE (AMARYL) 4 MG TABLET glimepiride (AMARYL) 4 MG tablet       Take 1 tablet (4 mg total) by mouth 2 (two) times daily with meals.    Take 4 mg by mouth.    JANUVIA 100 MG TAB JANUVIA 100 mg Tab       Take 1 tablet (100 mg total) by mouth once daily.    Take 1 tablet (100 mg total) by mouth once daily.    METFORMIN (GLUCOPHAGE) 1000 MG TABLET metFORMIN (GLUCOPHAGE) 1000 MG tablet       Take 1 tablet (1,000 mg total) by mouth 2 (two) times daily with meals.    Take  "1,000 mg by mouth.         Allergies  Review of patient's allergies indicates:  No Known Allergies    Physical Examination   Vital Signs  Temp: 97.5 °F (36.4 °C)  Temp src: Oral  Pulse: 83  Resp: 18  BP: 104/66  BP Location: Left arm  Patient Position: Sitting  Pain Score: 0-No pain  Height and Weight  Height: 5' 5" (165.1 cm)  Weight: 124.2 kg (273 lb 13 oz)  BSA (Calculated - sq m): 2.39 sq meters  BMI (Calculated): 45.6  Weight in (lb) to have BMI = 25: 149.9]    Physical Exam  Constitutional:       Appearance: Normal appearance. He is morbidly obese.   Cardiovascular:      Rate and Rhythm: Normal rate and regular rhythm.      Pulses:           Dorsalis pedis pulses are 2+ on the right side and 2+ on the left side.        Posterior tibial pulses are 2+ on the right side and 2+ on the left side.   Pulmonary:      Effort: Pulmonary effort is normal.      Breath sounds: Normal breath sounds.   Musculoskeletal:         General: Normal range of motion.      Cervical back: Normal range of motion.        Feet:    Feet:      Right foot:      Protective Sensation: 9 sites tested.  9 sites sensed.      Skin integrity: Skin integrity normal.      Toenail Condition: Right toenails are normal.      Left foot:      Protective Sensation: 9 sites tested.  9 sites sensed.      Skin integrity: Skin breakdown present.      Toenail Condition: Left toenails are normal.      Comments: Reddened area to left great toe; tender to touch.  Skin:     General: Skin is warm and dry.   Neurological:      General: No focal deficit present.      Mental Status: He is alert and oriented to person, place, and time.   Psychiatric:         Mood and Affect: Mood normal.         Results     Lab Results   Component Value Date    WBC 8.7 08/29/2022    RBC 4.27 (L) 08/29/2022    HGB 13.6 (L) 08/29/2022    HCT 38.5 (L) 08/29/2022    MCV 90.2 08/29/2022    MCH 31.9 (H) 08/29/2022    MCHC 35.3 08/29/2022    RDW 12.4 08/29/2022     08/29/2022    MPV " 10.9 (H) 08/29/2022     Sodium Level   Date Value Ref Range Status   08/29/2022 140 136 - 145 mmol/L Final     Potassium Level   Date Value Ref Range Status   08/29/2022 4.7 3.5 - 5.1 mmol/L Final     Carbon Dioxide   Date Value Ref Range Status   08/29/2022 27 22 - 29 mmol/L Final     Blood Urea Nitrogen   Date Value Ref Range Status   08/29/2022 26.7 (H) 8.4 - 25.7 mg/dL Final     Creatinine   Date Value Ref Range Status   08/29/2022 1.27 (H) 0.73 - 1.18 mg/dL Final     Calcium Level Total   Date Value Ref Range Status   08/29/2022 10.4 (H) 8.4 - 10.2 mg/dL Final     Albumin Level   Date Value Ref Range Status   08/29/2022 4.1 3.5 - 5.0 gm/dL Final     Bilirubin Total   Date Value Ref Range Status   08/29/2022 0.6 <=1.5 mg/dL Final     Alkaline Phosphatase   Date Value Ref Range Status   08/29/2022 61 40 - 150 unit/L Final     Aspartate Aminotransferase   Date Value Ref Range Status   08/29/2022 23 5 - 34 unit/L Final     Alanine Aminotransferase   Date Value Ref Range Status   08/29/2022 30 0 - 55 unit/L Final     Estimated GFR-Non    Date Value Ref Range Status   04/12/2022 72 >=90      Lab Results   Component Value Date    CHOL 134 04/12/2022     Lab Results   Component Value Date    HDL 35 04/12/2022     No results found for: LDLCALC  Lab Results   Component Value Date    TRIG 200 04/12/2022     No results found for: CHOLHDL  Lab Results   Component Value Date    TSH 0.8738 03/03/2022     Lab Results   Component Value Date    PHUR 5.0 03/03/2022    PROTEINUA Negative 03/03/2022    GLUCUA Normal 03/03/2022    KETONESU Negative 03/03/2022    OCCULTUA Negative 03/03/2022    NITRITE Negative 03/03/2022    LEUKOCYTESUR 75 03/03/2022     Lab Results   Component Value Date    HGBA1C 8.3 (H) 08/29/2022    HGBA1C 7.1 03/03/2022    HGBA1C 7.4 02/01/2022     No results found for: MICROALBUR, [id number]   No results found for this or any previous visit.         Assessment and Plan (including Health  Maintenance)     Plan:         Health Maintenance Due   Topic Date Due    Hepatitis C Screening  Never done    Pneumococcal Vaccines (Age 0-64) (1 - PCV) Never done    Foot Exam  Never done    Eye Exam  Never done    Colorectal Cancer Screening  Never done    Shingles Vaccine (1 of 2) Never done    COVID-19 Vaccine (4 - Booster for Moderna series) 04/02/2022    Influenza Vaccine (1) 09/01/2022       Problem List Items Addressed This Visit          Psychiatric    Depressive disorder - Primary    Current Assessment & Plan     Continue fluoxetine.  Denies SI/HI.  Read positive daily meditations, avoid negative media, set healthy boundaries.  Exercise daily, keep consistent sleep pattern, eat a healthy diet.  Establish good social support, make changes to reduce stress.  Do not drink alcohol or use illicit drugs.  Reports any symptoms of suicidal/homicidal ideations or self harm immediately, go to nearest emergency room.                Cardiac/Vascular    Arteriosclerosis of coronary artery    Current Assessment & Plan     Continue daily Aspirin, metoprolol, plavix and atorvastatin.  Keep cardio appts/diagnostics as scheduled.  Stressed importance of adequate LDL, HA1C, and BP control.  Discussed appropriate lifestyle changes including smoking cessation, exercise, weight loss, and dietary modifications.  ED precautions reviewed including SOB, HALL, chest pain, dizziness, near syncope, palpitations, or jaw/back/neck discomfort.            Benign essential hypertension    Current Assessment & Plan     Continue entresto.  Keep cardio appts/diagnostics as scheduled.  Follow a low sodium (less than 2 grams of sodium per day), DASH diet.   Continue medications as prescribed.  Monitor blood pressure and report any consistent values greater than 140/90 and keep a log.  Maintain healthy weight with a BMI goal of <30.   Aerobic exercise for 150 minutes per week (or 5 days a week for 30 minutes each day).           Chronic systolic  heart failure    Current Assessment & Plan     Continue entresto, metoprolol, asa and lipitor.   Keep Cardiology Clinic appts as scheduled.  Notify the clinic if you gain 3 or more pounds in one day or 5 or more pounds in one week.  Stressed importance of daily morning weights after urination but prior to breakfast on the same scale.  Low Sodium Diet (Dash Diet - less than 2 grams of sodium per day).  Follow a low cholesterol, low saturated fat diet with less that 200mg of cholesterol a day.  Cut down on alcohol if you have more than 1 drink a day (for women) or 2 drinks a day (for men).  Maintain healthy weight with goal BMI <30. Perform 30 minutes of daily physical activity 5 days per week.  Report to ER for chest pain, SOB, difficulty breathing, abdominal distention or significant edema to lower extremities.           Hyperlipidemia    Current Assessment & Plan     Continue lipitor and zetia.  Follow a low cholesterol, low saturated fat diet with less than 200 mg of cholesterol a day.   Avoid fried foods and high saturated fats (de jesus, sausage, cookies, cakes, chips, cheese, whole milk, butter, mayonnaise, creamy dressings, gravy and cream sauces).  Add flax seed or fish oil supplements to diet.   Increase dietary fiber.   Regular exercise improves cholesterol levels.  Physical activity 5 times a week for 30 minutes per day (or 150 minutes per week).   Stressed importance of dietary modifications.              Endocrine    Morbid obesity    Current Assessment & Plan     BMI 45. Goal BMI <30.  Aerobic exercise 150 minutes per week.  Avoid soda, simple sugars, sweets, excessive rice, pasta, potatoes or bread.   Choose brown options when available and portion control.  Limit fast foods and fried foods.   Choose complex carbs in moderation (ex: green, leafy vegetables, beans, oatmeal).  Eat plenty of fresh fruits and vegetables with lean meats daily.   Consider permanent healthy lifestyle changes.            Uncontrolled type 2 diabetes mellitus with hyperglycemia    Current Assessment & Plan      A1C 8.3 Previous A1C 7.7.   Refilled metformin 1000 mg BID.  Increase glimepiride 4 mg BID.  Refilled januvia 100 mg daily.  Continue medications as prescribed.  Follow ADA diet.  Avoid soda, simple sweets, and limit rice/pasta/bread/starches and consume brown options when possible.   Maintain healthy weight with BMI goal <30.   Perform aerobic exercise for 150 minutes per week (or 5 days a week for 30 minutes each day).   Examine feet daily.   Obtain annual dilated eye exam.  Eye exam: 11/27/21  Foot exam: 9/1/22           Relevant Medications    glimepiride (AMARYL) 4 MG tablet    metFORMIN (GLUCOPHAGE) 1000 MG tablet    JANUVIA 100 mg Tab    Other Relevant Orders    Hemoglobin A1C    Comprehensive Metabolic Panel    CBC Auto Differential    Ambulatory referral/consult to Wound Clinic       Orthopedic    Abrasion of toe of left foot    Current Assessment & Plan     Referral to wound clinic to eval/treat.  Do not wear tight/ill fitting shoes.  Do not pick, scratch or rub area.  Keep appt when scheduled.          Other Visit Diagnoses       Encounter for screening for malignant neoplasm of colon        Relevant Orders    OCCULT BLOOD FECAL IMMUNOASSAY    Abrasion of skin of left great toe        Relevant Orders    Ambulatory referral/consult to Wound Clinic            Health Maintenance Topics with due status: Not Due       Topic Last Completion Date    TETANUS VACCINE 10/05/2017    Diabetes Urine Screening 03/03/2022    Lipid Panel 04/12/2022    Hemoglobin A1c 08/29/2022    High Dose Statin 09/01/2022       Future Appointments   Date Time Provider Department Center   12/1/2022 12:00 PM CECILE Mcmanus [facility]            Signature:  CECILE Rainey  [facility]  [address] [facility] [address]    Date of encounter: 9/1/22

## 2022-09-05 PROBLEM — S90.415A ABRASION OF TOE OF LEFT FOOT: Status: ACTIVE | Noted: 2022-09-05

## 2022-09-06 NOTE — ASSESSMENT & PLAN NOTE
Continue entresto, metoprolol, asa and lipitor.   Keep Cardiology Clinic appts as scheduled.  Notify the clinic if you gain 3 or more pounds in one day or 5 or more pounds in one week.  Stressed importance of daily morning weights after urination but prior to breakfast on the same scale.  Low Sodium Diet (Dash Diet - less than 2 grams of sodium per day).  Follow a low cholesterol, low saturated fat diet with less that 200mg of cholesterol a day.  Cut down on alcohol if you have more than 1 drink a day (for women) or 2 drinks a day (for men).  Maintain healthy weight with goal BMI <30. Perform 30 minutes of daily physical activity 5 days per week.  Report to ER for chest pain, SOB, difficulty breathing, abdominal distention or significant edema to lower extremities.

## 2022-09-06 NOTE — ASSESSMENT & PLAN NOTE
Continue entresto.  Keep cardio appts/diagnostics as scheduled.  Follow a low sodium (less than 2 grams of sodium per day), DASH diet.   Continue medications as prescribed.  Monitor blood pressure and report any consistent values greater than 140/90 and keep a log.  Maintain healthy weight with a BMI goal of <30.   Aerobic exercise for 150 minutes per week (or 5 days a week for 30 minutes each day).

## 2022-09-06 NOTE — ASSESSMENT & PLAN NOTE
Continue daily Aspirin, metoprolol, plavix and atorvastatin.  Keep cardio appts/diagnostics as scheduled.  Stressed importance of adequate LDL, HA1C, and BP control.  Discussed appropriate lifestyle changes including smoking cessation, exercise, weight loss, and dietary modifications.  ED precautions reviewed including SOB, HALL, chest pain, dizziness, near syncope, palpitations, or jaw/back/neck discomfort.

## 2022-09-06 NOTE — ASSESSMENT & PLAN NOTE
Continue fluoxetine.  Denies SI/HI.  Read positive daily meditations, avoid negative media, set healthy boundaries.  Exercise daily, keep consistent sleep pattern, eat a healthy diet.  Establish good social support, make changes to reduce stress.  Do not drink alcohol or use illicit drugs.  Reports any symptoms of suicidal/homicidal ideations or self harm immediately, go to nearest emergency room.

## 2022-09-06 NOTE — ASSESSMENT & PLAN NOTE
A1C 8.3 Previous A1C 7.7.   Refilled metformin 1000 mg BID.  Increase glimepiride 4 mg BID.  Refilled januvia 100 mg daily.  Continue medications as prescribed.  Follow ADA diet.  Avoid soda, simple sweets, and limit rice/pasta/bread/starches and consume brown options when possible.   Maintain healthy weight with BMI goal <30.   Perform aerobic exercise for 150 minutes per week (or 5 days a week for 30 minutes each day).   Examine feet daily.   Obtain annual dilated eye exam.  Eye exam: 11/27/21  Foot exam: 9/1/22

## 2022-09-06 NOTE — ASSESSMENT & PLAN NOTE
Continue lipitor and zetia.  Follow a low cholesterol, low saturated fat diet with less than 200 mg of cholesterol a day.   Avoid fried foods and high saturated fats (de jesus, sausage, cookies, cakes, chips, cheese, whole milk, butter, mayonnaise, creamy dressings, gravy and cream sauces).  Add flax seed or fish oil supplements to diet.   Increase dietary fiber.   Regular exercise improves cholesterol levels.  Physical activity 5 times a week for 30 minutes per day (or 150 minutes per week).   Stressed importance of dietary modifications.

## 2022-09-06 NOTE — ASSESSMENT & PLAN NOTE
Referral to wound clinic to eval/treat.  Do not wear tight/ill fitting shoes.  Do not pick, scratch or rub area.  Keep appt when scheduled.

## 2022-09-19 ENCOUNTER — HOSPITAL ENCOUNTER (OUTPATIENT)
Dept: WOUND CARE | Facility: HOSPITAL | Age: 52
Discharge: HOME | End: 2022-09-19
Attending: NURSE PRACTITIONER

## 2022-09-19 VITALS
TEMPERATURE: 98 F | RESPIRATION RATE: 18 BRPM | DIASTOLIC BLOOD PRESSURE: 74 MMHG | HEART RATE: 106 BPM | SYSTOLIC BLOOD PRESSURE: 150 MMHG

## 2022-09-19 DIAGNOSIS — E66.01 MORBID OBESITY WITH BMI OF 45.0-49.9, ADULT: ICD-10-CM

## 2022-09-19 DIAGNOSIS — E11.69 ONYCHOMYCOSIS OF MULTIPLE TOENAILS WITH TYPE 2 DIABETES MELLITUS: ICD-10-CM

## 2022-09-19 DIAGNOSIS — B35.1 TINEA UNGUIUM: Primary | ICD-10-CM

## 2022-09-19 DIAGNOSIS — B35.1 ONYCHOMYCOSIS OF MULTIPLE TOENAILS WITH TYPE 2 DIABETES MELLITUS: ICD-10-CM

## 2022-09-19 PROBLEM — S90.415A ABRASION OF TOE OF LEFT FOOT: Status: RESOLVED | Noted: 2022-09-05 | Resolved: 2022-09-19

## 2022-09-19 PROCEDURE — 99203 OFFICE O/P NEW LOW 30 MIN: CPT | Mod: ,,, | Performed by: NURSE PRACTITIONER

## 2022-09-19 PROCEDURE — 99203 PR OFFICE/OUTPT VISIT, NEW, LEVL III, 30-44 MIN: ICD-10-PCS | Mod: ,,, | Performed by: NURSE PRACTITIONER

## 2022-09-19 NOTE — PROGRESS NOTES
CHIEF COMPLAINT:    Yellow and brittle big toe nails      HISTORY OF  PRESENT ILLNESS:  52 y.o. White male being seen today for evaluation and management of discolored and brittle toenails.  Left big toe has been problematic for about ten years.  Toenail has been removed in past; however, when it grew back, it was yellow and brittle.  Right big toe is mildly yellow and discolored.  Has never used any topical medications on nails, as far as he can recall.  Cannot recall taking any long-term oral medications for toenail infection.   Goes to [facility] to have toenails trimmed, which he plans to continue doing.  Originally from [facility].  Lived in [place] for approximately 16 years; moved to [place] about 6 years ago.  Followed by CECILE Rainey for PCP. History includes:        Past Medical History:   Diagnosis Date    Arteriosclerosis of coronary artery 9/1/2022    Benign essential hypertension 9/1/2022    Chronic systolic heart failure 9/1/2022    Depressive disorder 9/1/2022    Diabetes mellitus, type 2     Hernia of anterior abdominal wall 9/1/2022    Hyperlipidemia     Hypertension     Neuropathy 9/1/2022    Onychomycosis of multiple toenails with type 2 diabetes mellitus 9/19/2022    Tinea unguium 9/19/2022      Past Surgical History:   Procedure Laterality Date    TONSILLECTOMY        Social History     Socioeconomic History    Marital status:    Tobacco Use    Smoking status: Never    Smokeless tobacco: Never   Substance and Sexual Activity    Alcohol use: Never    Drug use: Never     Social Determinants of Health     Transportation Needs: No Transportation Needs    Lack of Transportation (Medical): No    Lack of Transportation (Non-Medical): No   Housing Stability: Low Risk     Unable to Pay for Housing in the Last Year: No    Number of Places Lived in the Last Year: 1    Unstable Housing in the Last Year: No       Review of Most Recent Wound Care-Related Labs:  Lab Results   Component Value  Date/Time    WBC 8.7 08/29/2022 01:54 PM    RBC 4.27 (L) 08/29/2022 01:54 PM    HGB 13.6 (L) 08/29/2022 01:54 PM    HCT 38.5 (L) 08/29/2022 01:54 PM    MCV 90.2 08/29/2022 01:54 PM    MCH 31.9 (H) 08/29/2022 01:54 PM    CREATININE 1.27 (H) 08/29/2022 01:54 PM    HGBA1C 8.3 (H) 08/29/2022 01:54 PM        Today 9/19/2022:  Agreeable to buying topically compounded antibiotics, in a nail polish formula, from [facility].  Denies numbness, tingling, and pain in feet/toes.  No swelling in feet.  Denies pain in legs with walking and laying down.   No new skin breakdown, rashes, or other skin issues.       REVIEW OF SYSTEMS:  Except as stated in HPI, all other 10 body systems normal.     Current Outpatient Medications   Medication Sig Dispense Refill    aspirin (ECOTRIN) 81 MG EC tablet Take 81 mg by mouth.      atorvastatin (LIPITOR) 80 MG tablet Take 80 mg by mouth once daily.      clopidogreL (PLAVIX) 75 mg tablet Take 75 mg by mouth.      cyclobenzaprine (FLEXERIL) 10 MG tablet TAKE ONE TABLET BY MOUTH TWICE A  tablet 1    ezetimibe (ZETIA) 10 mg tablet Take 1 tablet (10 mg total) by mouth once daily. 90 tablet 1    fenofibrate (TRICOR) 145 MG tablet Take 1 tablet (145 mg total) by mouth once daily. 180 tablet 1    finasteride (PROSCAR) 5 mg tablet Take 5 mg by mouth.      FLUoxetine 20 MG capsule Take 1 capsule (20 mg total) by mouth once daily. 90 capsule 1    glimepiride (AMARYL) 4 MG tablet Take 1 tablet (4 mg total) by mouth 2 (two) times daily with meals. 180 tablet 1    ibuprofen (ADVIL,MOTRIN) 800 MG tablet TAKE ONE TABLET BY MOUTH TWICE A DAY AS NEEDED FOR PAIN 90 tablet 1    JANUVIA 100 mg Tab Take 1 tablet (100 mg total) by mouth once daily. 90 tablet 1    metFORMIN (GLUCOPHAGE) 1000 MG tablet Take 1 tablet (1,000 mg total) by mouth 2 (two) times daily with meals. 180 tablet 1    metoprolol succinate (TOPROL-XL) 50 MG 24 hr tablet Take 50 mg by mouth once daily.      sacubitriL-valsartan  (ENTRESTO)  mg per tablet Take 1 tablet by mouth 2 (two) times daily.      UNABLE TO FIND MYCOTIC TOENAIL POLISH (PATIENT WILL PURCHASE OUT-OF-POCKET):    Ibuprofen 2%, Itraconazole 3%, and Vancomycin 2%.  Apply to clean and dry affected toenails twice a day (2 ml). 1 each 5     No current facility-administered medications for this encounter.         PHYSICAL EXAMINATION AND VITAL SIGNS:    Vitals:    09/19/22 1328   BP: (!) 150/74   Pulse: 106   Resp: 18   Temp: 98.2 °F (36.8 °C)     There is no height or weight on file to calculate BMI.    General:   Hypertensive, asymptomatic with, afebrile.  Morbidly obese, in no acute distress.   Respiratory: breathing even, quiet, and unlabored.  No coughing.  Cardiovascular:   Mild non-pitting edema over bilateral lateral ankles.  DP and PT pulses palpable bilaterally.   No hemosiderin staining or varicosities.  Significant hair distrubition over BLE and toes.  Toenails well-trimmed.  Bilateral hallux nails with mycotic, thickened, and dystrophic changes (L worse than R).    Musculoskeletal:  full range of motion of all extremities; no bunions, hammertoes, or claw toes  Neurologic:  A&O X 3.  Cranial nerves grossly intact.   Normal  monofilament testing bilaterally.  Psychiatric:  Calm, cooperative.  Mood and effect normal.  Responses appropriate.   Integumentary:    As above, bilateral hallux nails yellowed, thickened, and dystrophic.                          ASSESSMENT AND PLAN:    Morbid obesity, BMI 45.56:  This has been identified as a co-factor in the development of diabetic foot ulcers, non-healing wounds, and wound complications.     Type 2 diabetes, with onychomycosis of bilateral hallux toenails:  Last HgbA1C 8.3%.  Non-insulin dependent.  Diabetes being managed by PCP, ADILENE Mcmanus.    Diabetic foot care teaching, with handout given today.  Mr. Recio is going to continue having nails trimmed by .      Tinea Unguium of Bilateral Hallux  Toenails:  Teaching provided on underlying cause of condition, s/s of condition, complications, and treatment options of condition.    Handout given today.    RX:  Topically CMPD toenail polish from [facility] (Ibuprofen 2%, Itraconazole 3%, and Vancomycin 2%) twice/day until discoloration resolved.  Mr. Recio is going to purchase medication out-of-pocket.  Discussed cost of medications with him; address and phone number to [facility] provided to him today.                Return to clinic in six weeks for evaluation.  Teaching provided on s/s to call wound clinic for promptly.  UCC and ER precautions taught for after hours and weekends.

## 2022-10-31 ENCOUNTER — HOSPITAL ENCOUNTER (OUTPATIENT)
Dept: WOUND CARE | Facility: HOSPITAL | Age: 52
Discharge: HOME | End: 2022-10-31
Attending: NURSE PRACTITIONER

## 2022-10-31 VITALS — SYSTOLIC BLOOD PRESSURE: 143 MMHG | DIASTOLIC BLOOD PRESSURE: 82 MMHG | TEMPERATURE: 98 F | HEART RATE: 78 BPM

## 2022-10-31 DIAGNOSIS — E11.69 ONYCHOMYCOSIS OF MULTIPLE TOENAILS WITH TYPE 2 DIABETES MELLITUS: ICD-10-CM

## 2022-10-31 DIAGNOSIS — E66.01 MORBID OBESITY WITH BMI OF 45.0-49.9, ADULT: ICD-10-CM

## 2022-10-31 DIAGNOSIS — B35.1 ONYCHOMYCOSIS OF MULTIPLE TOENAILS WITH TYPE 2 DIABETES MELLITUS: ICD-10-CM

## 2022-10-31 DIAGNOSIS — B35.1 TINEA UNGUIUM: Primary | ICD-10-CM

## 2022-10-31 PROCEDURE — 99212 PR OFFICE/OUTPT VISIT, EST, LEVL II, 10-19 MIN: ICD-10-PCS | Mod: ,,, | Performed by: NURSE PRACTITIONER

## 2022-10-31 PROCEDURE — 99211 OFF/OP EST MAY X REQ PHY/QHP: CPT

## 2022-10-31 PROCEDURE — 99212 OFFICE O/P EST SF 10 MIN: CPT | Mod: ,,, | Performed by: NURSE PRACTITIONER

## 2022-10-31 NOTE — PROGRESS NOTES
TODAY'S VISIT NOTE WAS IMPORTED FROM LAST WOUND CLINIC VISIT OF 9/19/2022.  I ATTEST THAT I REVIEWED THE HPI, ROS, LABS, WOUND-RELATED IMAGING, PHYSICAL EXAMINATION, EVALUATION AND PLAN SECTIONS OF IMPORTED NOTE AND REVISED TODAY'S VISIT NOTE TO REFLECT TODAY'S NEW ASSESSMENT FINDINGS AND TODAY'S UPDATES TO WOUND TREATMENT PLAN.        CHIEF COMPLAINT:    Yellow and brittle big toe nails      HISTORY OF  PRESENT ILLNESS:  52 y.o. White male being seen today for follow-up of discolored and brittle toenails.  Left big toe has been problematic for about ten years.  Toenail has been removed in past; however, when it grew back, it was yellow and brittle.  Right big toe is mildly yellow and discolored.  Prescribed topically CMPD Ibuprofen 2%, Itraconazole 3%, and Vancomycin 2% nail polish, which he is applying twice/day.  Goes to  to have toenails trimmed, which he plans to continue doing.  Originally from [facility].  Lived in [place] for approximately 16 years; moved to [place] about 6 years ago.  Followed by CECILE Rainey for PCP. History includes:        Past Medical History:   Diagnosis Date    Arteriosclerosis of coronary artery 9/1/2022    Benign essential hypertension 9/1/2022    Chronic systolic heart failure 9/1/2022    Depressive disorder 9/1/2022    Diabetes mellitus, type 2     Hernia of anterior abdominal wall 9/1/2022    Hyperlipidemia     Hypertension     Neuropathy 9/1/2022    Onychomycosis of multiple toenails with type 2 diabetes mellitus 9/19/2022    Tinea unguium 9/19/2022      Past Surgical History:   Procedure Laterality Date    TONSILLECTOMY        Social History     Socioeconomic History    Marital status:    Tobacco Use    Smoking status: Never    Smokeless tobacco: Never   Substance and Sexual Activity    Alcohol use: Never    Drug use: Never     Social Determinants of Health     Transportation Needs: No Transportation Needs    Lack of Transportation (Medical): No     Lack of Transportation (Non-Medical): No   Housing Stability: Low Risk     Unable to Pay for Housing in the Last Year: No    Number of Places Lived in the Last Year: 1    Unstable Housing in the Last Year: No       Review of Most Recent Wound Care-Related Labs:  Lab Results   Component Value Date/Time    WBC 8.7 08/29/2022 01:54 PM    RBC 4.27 (L) 08/29/2022 01:54 PM    HGB 13.6 (L) 08/29/2022 01:54 PM    HCT 38.5 (L) 08/29/2022 01:54 PM    MCV 90.2 08/29/2022 01:54 PM    MCH 31.9 (H) 08/29/2022 01:54 PM    CREATININE 1.27 (H) 08/29/2022 01:54 PM    HGBA1C 8.3 (H) 08/29/2022 01:54 PM        Today 10/31/2022:  Is very  happy with how well left big toenail has responded to topically CMPD nail polish.  Both big toenails look much better; he is wondering if he can use medication on left thumb nail, which is turning yellow.  Medication was $56.00 cash-pay, as he is self-pay status.  No new rashes, lesions, or skin breakdown.     9/19/2022:  Agreeable to buying topically compounded antibiotics, in a nail polish formula, from [facility].  Denies numbness, tingling, and pain in feet/toes.  No swelling in feet.  Denies pain in legs with walking and laying down.   No new skin breakdown, rashes, or other skin issues.       REVIEW OF SYSTEMS:  Except as stated in HPI, all other 10 body systems normal.     Current Outpatient Medications   Medication Sig Dispense Refill    aspirin (ECOTRIN) 81 MG EC tablet Take 81 mg by mouth.      atorvastatin (LIPITOR) 80 MG tablet Take 80 mg by mouth once daily.      clopidogreL (PLAVIX) 75 mg tablet Take 75 mg by mouth.      cyclobenzaprine (FLEXERIL) 10 MG tablet TAKE ONE TABLET BY MOUTH TWICE A  tablet 1    ezetimibe (ZETIA) 10 mg tablet Take 1 tablet (10 mg total) by mouth once daily. 90 tablet 1    fenofibrate (TRICOR) 145 MG tablet Take 1 tablet (145 mg total) by mouth once daily. 180 tablet 1    finasteride (PROSCAR) 5 mg tablet Take 5 mg by mouth.      FLUoxetine  20 MG capsule Take 1 capsule (20 mg total) by mouth once daily. 90 capsule 1    glimepiride (AMARYL) 4 MG tablet Take 1 tablet (4 mg total) by mouth 2 (two) times daily with meals. 180 tablet 1    ibuprofen (ADVIL,MOTRIN) 800 MG tablet TAKE ONE TABLET BY MOUTH TWICE A DAY AS NEEDED FOR PAIN 90 tablet 1    JANUVIA 100 mg Tab Take 1 tablet (100 mg total) by mouth once daily. 90 tablet 1    metFORMIN (GLUCOPHAGE) 1000 MG tablet Take 1 tablet (1,000 mg total) by mouth 2 (two) times daily with meals. 180 tablet 1    metoprolol succinate (TOPROL-XL) 50 MG 24 hr tablet Take 50 mg by mouth once daily.      sacubitriL-valsartan (ENTRESTO)  mg per tablet Take 1 tablet by mouth 2 (two) times daily.      UNABLE TO FIND MYCOTIC TOENAIL POLISH (PATIENT WILL PURCHASE OUT-OF-POCKET):    Ibuprofen 2%, Itraconazole 3%, and Vancomycin 2%.  Apply to clean and dry affected toenails twice a day (2 ml). 1 each 5     No current facility-administered medications for this encounter.         PHYSICAL EXAMINATION AND VITAL SIGNS:    Vitals:    10/31/22 1307   BP: (!) 143/82   Pulse: 78   Temp: 97.9 °F (36.6 °C)     There is no height or weight on file to calculate BMI.    General:   Hypertensive, asymptomatic with, afebrile.  Morbidly obese, in no acute distress.   Respiratory: breathing even, quiet, and unlabored.  No coughing.  Cardiovascular:   Mild non-pitting edema over bilateral lateral ankles.  No hemosiderin staining or varicosities.  Significant hair distrubition over BLE and toes.  Toenails well-trimmed.  Musculoskeletal:  full range of motion of all extremities; no bunions, hammertoes, or claw toes  Neurologic:  A&O X 3.  Cranial nerves grossly intact.     Psychiatric:  Calm, cooperative.  Mood and effect normal.  Responses appropriate.   Integumentary:      Significant improvement in bilateral hallux toenails.  Right toenail is completely normal in color.  Left toenail is now white, with evidence of new nail growth.  Left  thumb nail with punctate lesions, but of normal color.                  ASSESSMENT AND PLAN:    Morbid obesity, BMI 45.56:  This has been identified as a co-factor in the development of diabetic foot ulcers, non-healing wounds, and wound complications.     Type 2 diabetes, with onychomycosis of bilateral hallux toenails:  Last HgbA1C 8.3%.  Non-insulin dependent.  Diabetes being managed by PCP, ADILENE Mcmanus.    Mr. Recio has toenails trimmed by a  and wishes to continue with that.      Tinea Unguium of Bilateral Hallux Toenails:  Significant improvement with current tx plan.     Prescribed topically CMPD toenail polish from [facility] (Ibuprofen 2%, Itraconazole 3%, and Vancomycin 2%) twice/day.  Instructed him to continue with current tx for a total of six months, or until toenail color and presentation are normal, in comparison to other toenails.   Instructed him that he has refills on nail polish medications and can get new scripts, as needed, from PCP,  ADILENE Mcmanus.                We will see Mr. Recio on an as-needed basis for any future nail or wound-related issues.

## 2022-11-29 ENCOUNTER — LAB VISIT (OUTPATIENT)
Dept: LAB | Facility: HOSPITAL | Age: 52
End: 2022-11-29
Attending: NURSE PRACTITIONER

## 2022-11-29 DIAGNOSIS — E11.65 UNCONTROLLED TYPE 2 DIABETES MELLITUS WITH HYPERGLYCEMIA: ICD-10-CM

## 2022-11-29 LAB
ALBUMIN SERPL-MCNC: 3.9 GM/DL (ref 3.5–5)
ALBUMIN/GLOB SERPL: 1.1 RATIO (ref 1.1–2)
ALP SERPL-CCNC: 60 UNIT/L (ref 40–150)
ALT SERPL-CCNC: 25 UNIT/L (ref 0–55)
AST SERPL-CCNC: 17 UNIT/L (ref 5–34)
BASOPHILS # BLD AUTO: 0.04 X10(3)/MCL (ref 0–0.2)
BASOPHILS NFR BLD AUTO: 0.5 %
BILIRUBIN DIRECT+TOT PNL SERPL-MCNC: 0.6 MG/DL
BUN SERPL-MCNC: 14.9 MG/DL (ref 8.4–25.7)
CALCIUM SERPL-MCNC: 9.7 MG/DL (ref 8.4–10.2)
CHLORIDE SERPL-SCNC: 106 MMOL/L (ref 98–107)
CO2 SERPL-SCNC: 24 MMOL/L (ref 22–29)
CREAT SERPL-MCNC: 0.88 MG/DL (ref 0.73–1.18)
EOSINOPHIL # BLD AUTO: 0.48 X10(3)/MCL (ref 0–0.9)
EOSINOPHIL NFR BLD AUTO: 5.7 %
ERYTHROCYTE [DISTWIDTH] IN BLOOD BY AUTOMATED COUNT: 12.8 % (ref 11.5–17)
EST. AVERAGE GLUCOSE BLD GHB EST-MCNC: 168.6 MG/DL
GFR SERPLBLD CREATININE-BSD FMLA CKD-EPI: >60 MLS/MIN/1.73/M2
GLOBULIN SER-MCNC: 3.4 GM/DL (ref 2.4–3.5)
GLUCOSE SERPL-MCNC: 205 MG/DL (ref 74–100)
HBA1C MFR BLD: 7.5 %
HCT VFR BLD AUTO: 36.2 % (ref 42–52)
HGB BLD-MCNC: 12.8 GM/DL (ref 14–18)
IMM GRANULOCYTES # BLD AUTO: 0.05 X10(3)/MCL (ref 0–0.04)
IMM GRANULOCYTES NFR BLD AUTO: 0.6 %
LYMPHOCYTES # BLD AUTO: 1.33 X10(3)/MCL (ref 0.6–4.6)
LYMPHOCYTES NFR BLD AUTO: 15.7 %
MCH RBC QN AUTO: 32.7 PG (ref 27–31)
MCHC RBC AUTO-ENTMCNC: 35.4 MG/DL (ref 33–36)
MCV RBC AUTO: 92.3 FL (ref 80–94)
MONOCYTES # BLD AUTO: 0.81 X10(3)/MCL (ref 0.1–1.3)
MONOCYTES NFR BLD AUTO: 9.6 %
NEUTROPHILS # BLD AUTO: 5.8 X10(3)/MCL (ref 2.1–9.2)
NEUTROPHILS NFR BLD AUTO: 67.9 %
NRBC BLD AUTO-RTO: 0 %
PLATELET # BLD AUTO: 277 X10(3)/MCL (ref 130–400)
PMV BLD AUTO: 10.2 FL (ref 7.4–10.4)
POTASSIUM SERPL-SCNC: 4.3 MMOL/L (ref 3.5–5.1)
PROT SERPL-MCNC: 7.3 GM/DL (ref 6.4–8.3)
RBC # BLD AUTO: 3.92 X10(6)/MCL (ref 4.7–6.1)
SODIUM SERPL-SCNC: 139 MMOL/L (ref 136–145)
WBC # SPEC AUTO: 8.5 X10(3)/MCL (ref 4.5–11.5)

## 2022-11-29 PROCEDURE — 80053 COMPREHEN METABOLIC PANEL: CPT

## 2022-11-29 PROCEDURE — 85025 COMPLETE CBC W/AUTO DIFF WBC: CPT

## 2022-11-29 PROCEDURE — 83036 HEMOGLOBIN GLYCOSYLATED A1C: CPT

## 2022-11-29 PROCEDURE — 36415 COLL VENOUS BLD VENIPUNCTURE: CPT

## 2022-12-22 ENCOUNTER — OFFICE VISIT (OUTPATIENT)
Dept: INTERNAL MEDICINE | Facility: CLINIC | Age: 52
End: 2022-12-22

## 2022-12-22 DIAGNOSIS — F32.A DEPRESSIVE DISORDER: Primary | ICD-10-CM

## 2022-12-22 DIAGNOSIS — Z12.5 SCREENING FOR MALIGNANT NEOPLASM OF PROSTATE: ICD-10-CM

## 2022-12-22 DIAGNOSIS — I25.10 ARTERIOSCLEROSIS OF CORONARY ARTERY: ICD-10-CM

## 2022-12-22 DIAGNOSIS — D64.9 ANEMIA, UNSPECIFIED TYPE: ICD-10-CM

## 2022-12-22 DIAGNOSIS — I50.22 CHRONIC SYSTOLIC HEART FAILURE: ICD-10-CM

## 2022-12-22 DIAGNOSIS — I10 BENIGN ESSENTIAL HYPERTENSION: ICD-10-CM

## 2022-12-22 DIAGNOSIS — E78.2 MIXED HYPERLIPIDEMIA: ICD-10-CM

## 2022-12-22 DIAGNOSIS — E66.01 CLASS 3 SEVERE OBESITY DUE TO EXCESS CALORIES WITH SERIOUS COMORBIDITY AND BODY MASS INDEX (BMI) OF 45.0 TO 49.9 IN ADULT: ICD-10-CM

## 2022-12-22 DIAGNOSIS — E11.65 UNCONTROLLED TYPE 2 DIABETES MELLITUS WITH HYPERGLYCEMIA: ICD-10-CM

## 2022-12-22 PROBLEM — E66.813 CLASS 3 SEVERE OBESITY DUE TO EXCESS CALORIES WITH SERIOUS COMORBIDITY AND BODY MASS INDEX (BMI) OF 45.0 TO 49.9 IN ADULT: Status: ACTIVE | Noted: 2022-09-01

## 2022-12-22 PROCEDURE — 99214 PR OFFICE/OUTPT VISIT, EST, LEVL IV, 30-39 MIN: ICD-10-PCS | Mod: 95,,, | Performed by: NURSE PRACTITIONER

## 2022-12-22 PROCEDURE — 99214 OFFICE O/P EST MOD 30 MIN: CPT | Mod: 95,,, | Performed by: NURSE PRACTITIONER

## 2022-12-22 RX ORDER — EZETIMIBE 10 MG/1
10 TABLET ORAL DAILY
Qty: 90 TABLET | Refills: 1 | Status: SHIPPED | OUTPATIENT
Start: 2022-12-22 | End: 2023-05-15 | Stop reason: SDUPTHER

## 2022-12-22 RX ORDER — SITAGLIPTIN 100 MG/1
100 TABLET, FILM COATED ORAL DAILY
Qty: 90 TABLET | Refills: 1 | Status: SHIPPED | OUTPATIENT
Start: 2022-12-22 | End: 2023-05-15 | Stop reason: SDUPTHER

## 2022-12-22 RX ORDER — ATORVASTATIN CALCIUM 80 MG/1
80 TABLET, FILM COATED ORAL DAILY
Qty: 90 TABLET | Refills: 1 | Status: SHIPPED | OUTPATIENT
Start: 2022-12-22 | End: 2023-05-15 | Stop reason: SDUPTHER

## 2022-12-22 RX ORDER — FLUOXETINE HYDROCHLORIDE 20 MG/1
20 CAPSULE ORAL DAILY
Qty: 90 CAPSULE | Refills: 1 | Status: SHIPPED | OUTPATIENT
Start: 2022-12-22 | End: 2023-05-15 | Stop reason: SDUPTHER

## 2022-12-22 RX ORDER — FENOFIBRATE 145 MG/1
145 TABLET, FILM COATED ORAL DAILY
Qty: 180 TABLET | Refills: 1 | Status: SHIPPED | OUTPATIENT
Start: 2022-12-22 | End: 2023-05-15 | Stop reason: SDUPTHER

## 2022-12-22 RX ORDER — METOPROLOL SUCCINATE 50 MG/1
50 TABLET, EXTENDED RELEASE ORAL DAILY
Qty: 90 TABLET | Refills: 1 | Status: SHIPPED | OUTPATIENT
Start: 2022-12-22 | End: 2023-05-22 | Stop reason: SDUPTHER

## 2022-12-22 RX ORDER — GLIMEPIRIDE 4 MG/1
4 TABLET ORAL 2 TIMES DAILY WITH MEALS
Qty: 180 TABLET | Refills: 1 | Status: SHIPPED | OUTPATIENT
Start: 2022-12-22 | End: 2023-05-15 | Stop reason: SDUPTHER

## 2022-12-22 NOTE — ASSESSMENT & PLAN NOTE
Contact cardio for RTC.  Continue meds as prescribed.  Achieve adequate blood glucose control.  Achieve healthy BMI.  Dash diet and aerobic exercise encouraged.

## 2022-12-22 NOTE — ASSESSMENT & PLAN NOTE
A1C 7.5 not at goal- improving. Previous A1C 8.3.   Refilled metformin, glimipiride, and januvia.  Declines med adjustments today.  Will attempt to continue dietary modifications.   Continue medications as prescribed.  Follow ADA diet.  Avoid soda, simple sweets, and limit rice/pasta/bread/starches and consume brown options when possible.   Maintain healthy weight with BMI goal <30.   Perform aerobic exercise for 150 minutes per week (or 5 days a week for 30 minutes each day).   Examine feet daily.   Obtain annual dilated eye exam.  Eye exam:  Foot exam:

## 2022-12-22 NOTE — ASSESSMENT & PLAN NOTE
Refilled atorvastatin, fenofibrate and zetia.  Follow a low cholesterol, low saturated fat diet with less than 200 mg of cholesterol a day.   Avoid fried foods and high saturated fats (de jesus, sausage, cookies, cakes, chips, cheese, whole milk, butter, mayonnaise, creamy dressings, gravy and cream sauces).  Add flax seed or fish oil supplements to diet.   Increase dietary fiber.   Regular exercise improves cholesterol levels.  Physical activity 5 times a week for 30 minutes per day (or 150 minutes per week).   Stressed importance of dietary modifications.

## 2022-12-22 NOTE — ASSESSMENT & PLAN NOTE
Continue entresto.   Keep Cardiology Clinic appts as scheduled.  Notify the clinic if you gain 3 or more pounds in one day or 5 or more pounds in one week.  Stressed importance of daily morning weights after urination but prior to breakfast on the same scale.  Low Sodium Diet (Dash Diet - less than 2 grams of sodium per day).  Follow a low cholesterol, low saturated fat diet with less that 200mg of cholesterol a day.  Cut down on alcohol if you have more than 1 drink a day (for women) or 2 drinks a day (for men).  Maintain healthy weight with goal BMI <30. Perform 30 minutes of daily physical activity 5 days per week.  Report to ER for chest pain, SOB, difficulty breathing, abdominal distention or significant edema to lower extremities.

## 2022-12-22 NOTE — PROGRESS NOTES
Audio Only Telehealth Visit     The patient location is: at work  The chief complaint leading to consultation is: uncontrolled DM f/u  Visit type: Virtual visit with audio only (telephone)  Total time spent with patient: 27 mins     The reason for the audio only service rather than synchronous audio and video virtual visit was related to technical difficulties or patient preference/necessity.     Each patient to whom I provide medical services by telemedicine is:  (1) informed of the relationship between the physician and patient and the respective role of any other health care provider with respect to management of the patient; and (2) notified that they may decline to receive medical services by telemedicine and may withdraw from such care at any time. Patient verbally consented to receive this service via voice-only telephone call.    Billing Provider: CECILE Rainey  Level of Service: DE OFFICE/OUTPT VISIT, EST, LEVL IV, 30-39 MIN  Patient PCP Information       Provider PCP Type    CECILE Rainey General            Reason for Visit / Chief Complaint: Follow-up (Lab results, refills needed)       History of Present Illness / Problem Focused Workflow     Mynor Recio is a 52 y.o. White male for DM f/u. PMH diabetes, hypertension, HLD, HFrEF (LVEF 30-35%; 2/27/2019), and s/p PCI with stent x2 (OM1 and mid circumflex; September 2019), ventral hernia, morbid obesity, depression, and tobacco abuse. Followed by [facility] cardio clinic. Reports med compliance. Does not check CBGs as having trouble finding test strips. Is attempting to follow ADA/low sodium diet. Is decreasing on sweets. Feels as though his pants are a little looser. Does not want to have to take more medicine. Is pleased with DFC and toenail trimming. Denies chest pain, shortness of breath, cough, fever, headache, dizziness, weakness, abdominal pain, nausea, vomiting, diarrhea, constipation, dysuria, depression, anxiety, SI/HI.    Review  of Systems     Review of Systems   Constitutional: Negative.    HENT: Negative.     Eyes: Negative.    Respiratory: Negative.     Cardiovascular: Negative.    Gastrointestinal: Negative.    Endocrine: Negative.    Genitourinary: Negative.    Musculoskeletal: Negative.    Integumentary:  Negative.   Neurological: Negative.    Psychiatric/Behavioral: Negative.        Medical / Social / Family History     Past Medical History:   Diagnosis Date    Arteriosclerosis of coronary artery 9/1/2022    Benign essential hypertension 9/1/2022    Chronic systolic heart failure 9/1/2022    Depressive disorder 9/1/2022    Diabetes mellitus, type 2     Hernia of anterior abdominal wall 9/1/2022    Hyperlipidemia     Hypertension     Neuropathy 9/1/2022    Onychomycosis of multiple toenails with type 2 diabetes mellitus 9/19/2022    Tinea unguium 9/19/2022       Past Surgical History:   Procedure Laterality Date    TONSILLECTOMY         Social History  Mr. Recio reports that he has never smoked. He has never used smokeless tobacco. He reports that he does not drink alcohol and does not use drugs.    Family History  's Family history is unknown by patient.    Medications and Allergies     Medications  Medication List with Changes/Refills   Current Medications    ASPIRIN (ECOTRIN) 81 MG EC TABLET    Take 81 mg by mouth.    CLOPIDOGREL (PLAVIX) 75 MG TABLET    Take 75 mg by mouth.    CYCLOBENZAPRINE (FLEXERIL) 10 MG TABLET    TAKE ONE TABLET BY MOUTH TWICE A DAY    FINASTERIDE (PROSCAR) 5 MG TABLET    Take 5 mg by mouth.    IBUPROFEN (ADVIL,MOTRIN) 800 MG TABLET    TAKE ONE TABLET BY MOUTH TWICE A DAY AS NEEDED FOR PAIN    METFORMIN (GLUCOPHAGE) 1000 MG TABLET    Take 1 tablet (1,000 mg total) by mouth 2 (two) times daily with meals.    SACUBITRIL-VALSARTAN (ENTRESTO)  MG PER TABLET    Take 1 tablet by mouth 2 (two) times daily.    UNABLE TO FIND    MYCOTIC TOENAIL POLISH (PATIENT WILL PURCHASE  OUT-OF-POCKET):    Ibuprofen 2%, Itraconazole 3%, and Vancomycin 2%.  Apply to clean and dry affected toenails twice a day (2 ml).   Changed and/or Refilled Medications    Modified Medication Previous Medication    ATORVASTATIN (LIPITOR) 80 MG TABLET atorvastatin (LIPITOR) 80 MG tablet       Take 1 tablet (80 mg total) by mouth once daily.    Take 80 mg by mouth once daily.    EZETIMIBE (ZETIA) 10 MG TABLET ezetimibe (ZETIA) 10 mg tablet       Take 1 tablet (10 mg total) by mouth once daily.    Take 1 tablet (10 mg total) by mouth once daily.    FENOFIBRATE (TRICOR) 145 MG TABLET fenofibrate (TRICOR) 145 MG tablet       Take 1 tablet (145 mg total) by mouth once daily.    Take 1 tablet (145 mg total) by mouth once daily.    FLUOXETINE 20 MG CAPSULE FLUoxetine 20 MG capsule       Take 1 capsule (20 mg total) by mouth once daily.    Take 1 capsule (20 mg total) by mouth once daily.    GLIMEPIRIDE (AMARYL) 4 MG TABLET glimepiride (AMARYL) 4 MG tablet       Take 1 tablet (4 mg total) by mouth 2 (two) times daily with meals.    Take 1 tablet (4 mg total) by mouth 2 (two) times daily with meals.    JANUVIA 100 MG TAB JANUVIA 100 mg Tab       Take 1 tablet (100 mg total) by mouth once daily.    Take 1 tablet (100 mg total) by mouth once daily.    METOPROLOL SUCCINATE (TOPROL-XL) 50 MG 24 HR TABLET metoprolol succinate (TOPROL-XL) 50 MG 24 hr tablet       Take 1 tablet (50 mg total) by mouth once daily.    Take 50 mg by mouth once daily.         Allergies  Review of patient's allergies indicates:  No Known Allergies    Physical Examination   Vital Signs  Pain Score: 0-No pain]    Physical Exam  Neurological:      Mental Status: He is alert and oriented to person, place, and time.   Psychiatric:         Mood and Affect: Mood normal.         Speech: Speech normal.         Behavior: Behavior normal. Behavior is cooperative.         Thought Content: Thought content normal.         Judgment: Judgment normal.       Results      Lab Results   Component Value Date    WBC 8.5 11/29/2022    RBC 3.92 (L) 11/29/2022    HGB 12.8 (L) 11/29/2022    HCT 36.2 (L) 11/29/2022    MCV 92.3 11/29/2022    MCH 32.7 (H) 11/29/2022    MCHC 35.4 11/29/2022    RDW 12.8 11/29/2022     11/29/2022    MPV 10.2 11/29/2022     Sodium Level   Date Value Ref Range Status   11/29/2022 139 136 - 145 mmol/L Final     Potassium Level   Date Value Ref Range Status   11/29/2022 4.3 3.5 - 5.1 mmol/L Final     Carbon Dioxide   Date Value Ref Range Status   11/29/2022 24 22 - 29 mmol/L Final     Blood Urea Nitrogen   Date Value Ref Range Status   11/29/2022 14.9 8.4 - 25.7 mg/dL Final     Creatinine   Date Value Ref Range Status   11/29/2022 0.88 0.73 - 1.18 mg/dL Final     Calcium Level Total   Date Value Ref Range Status   11/29/2022 9.7 8.4 - 10.2 mg/dL Final     Albumin Level   Date Value Ref Range Status   11/29/2022 3.9 3.5 - 5.0 gm/dL Final     Bilirubin Total   Date Value Ref Range Status   11/29/2022 0.6 <=1.5 mg/dL Final     Alkaline Phosphatase   Date Value Ref Range Status   11/29/2022 60 40 - 150 unit/L Final     Aspartate Aminotransferase   Date Value Ref Range Status   11/29/2022 17 5 - 34 unit/L Final     Alanine Aminotransferase   Date Value Ref Range Status   11/29/2022 25 0 - 55 unit/L Final     Estimated GFR-Non    Date Value Ref Range Status   04/12/2022 72 >=90      Lab Results   Component Value Date    CHOL 134 04/12/2022     Lab Results   Component Value Date    HDL 35 04/12/2022     No results found for: LDLCALC  Lab Results   Component Value Date    TRIG 200 04/12/2022     No results found for: CHOLHDL  Lab Results   Component Value Date    TSH 0.8738 03/03/2022     Lab Results   Component Value Date    PHUR 5.0 03/03/2022    PROTEINUA Negative 03/03/2022    GLUCUA Normal 03/03/2022    KETONESU Negative 03/03/2022    OCCULTUA Negative 03/03/2022    NITRITE Negative 03/03/2022    LEUKOCYTESUR 75 03/03/2022     Lab Results    Component Value Date    HGBA1C 7.5 (H) 11/29/2022    HGBA1C 8.3 (H) 08/29/2022    HGBA1C 7.1 03/03/2022     No results found for: KHARI, ZRRQ26TEQ   No results found for this or any previous visit.         Assessment and Plan (including Health Maintenance)     Plan: RTC 4 months and prn. Labs one week prior to appt.         Health Maintenance Due   Topic Date Due    Pneumococcal Vaccines (Age 0-64) (1 - PCV) Never done    Eye Exam  Never done    Shingles Vaccine (1 of 2) Never done    COVID-19 Vaccine (4 - Booster for Moderna series) 01/27/2022    Influenza Vaccine (1) 09/01/2022       Problem List Items Addressed This Visit          Psychiatric    Depressive disorder - Primary    Current Assessment & Plan     Continue fluoxetine.  Denies SI/HI.  Read positive daily meditations, avoid negative media, set healthy boundaries.  Exercise daily, keep consistent sleep pattern, eat a healthy diet.  Establish good social support, make changes to reduce stress.  Do not drink alcohol or use illicit drugs.  Reports any symptoms of suicidal/homicidal ideations or self harm immediately, go to nearest emergency room.                Cardiac/Vascular    Arteriosclerosis of coronary artery    Current Assessment & Plan     Contact cardio for RTC.  Continue meds as prescribed.  Achieve adequate blood glucose control.  Achieve healthy BMI.  Dash diet and aerobic exercise encouraged.         Relevant Orders    Lipid Panel    Benign essential hypertension    Relevant Orders    CBC Auto Differential    Microalbumin/Creatinine Ratio, Urine    Urinalysis, Reflex to Urine Culture Urine, Clean Catch    TSH    Chronic systolic heart failure    Current Assessment & Plan     Continue entresto.   Keep Cardiology Clinic appts as scheduled.  Notify the clinic if you gain 3 or more pounds in one day or 5 or more pounds in one week.  Stressed importance of daily morning weights after urination but prior to breakfast on the same scale.  Low  Sodium Diet (Dash Diet - less than 2 grams of sodium per day).  Follow a low cholesterol, low saturated fat diet with less that 200mg of cholesterol a day.  Cut down on alcohol if you have more than 1 drink a day (for women) or 2 drinks a day (for men).  Maintain healthy weight with goal BMI <30. Perform 30 minutes of daily physical activity 5 days per week.  Report to ER for chest pain, SOB, difficulty breathing, abdominal distention or significant edema to lower extremities.           Hyperlipidemia    Current Assessment & Plan     Refilled atorvastatin, fenofibrate and zetia.  Follow a low cholesterol, low saturated fat diet with less than 200 mg of cholesterol a day.   Avoid fried foods and high saturated fats (de jesus, sausage, cookies, cakes, chips, cheese, whole milk, butter, mayonnaise, creamy dressings, gravy and cream sauces).  Add flax seed or fish oil supplements to diet.   Increase dietary fiber.   Regular exercise improves cholesterol levels.  Physical activity 5 times a week for 30 minutes per day (or 150 minutes per week).   Stressed importance of dietary modifications.           Relevant Orders    Lipid Panel       Endocrine    Class 3 severe obesity due to excess calories with serious comorbidity and body mass index (BMI) of 45.0 to 49.9 in adult    Current Assessment & Plan     BMI 45. Goal BMI <30.  Aerobic exercise 150 minutes per week.  Avoid soda, simple sugars, sweets, excessive rice, pasta, potatoes or bread.   Choose brown options when available and portion control.  Limit fast foods and fried foods.   Choose complex carbs in moderation (ex: green, leafy vegetables, beans, oatmeal).  Eat plenty of fresh fruits and vegetables with lean meats daily.   Consider permanent healthy lifestyle changes.           Uncontrolled type 2 diabetes mellitus with hyperglycemia    Current Assessment & Plan     A1C 7.5 not at goal- improving. Previous A1C 8.3.   Refilled metformin, glimipiride, and  januvia.  Declines med adjustments today.  Will attempt to continue dietary modifications.   Continue medications as prescribed.  Follow ADA diet.  Avoid soda, simple sweets, and limit rice/pasta/bread/starches and consume brown options when possible.   Maintain healthy weight with BMI goal <30.   Perform aerobic exercise for 150 minutes per week (or 5 days a week for 30 minutes each day).   Examine feet daily.   Obtain annual dilated eye exam.  Eye exam:  Foot exam:           Relevant Medications    JANUVIA 100 mg Tab    glimepiride (AMARYL) 4 MG tablet    Other Relevant Orders    CBC Auto Differential    Hemoglobin A1C    Microalbumin/Creatinine Ratio, Urine    Urinalysis, Reflex to Urine Culture Urine, Clean Catch     Other Visit Diagnoses       Screening for malignant neoplasm of prostate        Relevant Orders    PSA, Screening    Anemia, unspecified type        Relevant Orders    Iron and TIBC    Ferritin          flu  Health Maintenance Topics with due status: Not Due       Topic Last Completion Date    TETANUS VACCINE 10/05/2017    Diabetes Urine Screening 03/03/2022    Lipid Panel 04/12/2022    Colorectal Cancer Screening 04/29/2022    High Dose Statin 09/01/2022    Foot Exam 09/01/2022    Hemoglobin A1c 11/29/2022       No future appointments.         Signature:  CECILE Rainey  [facility]  [address] [facility] [id number]    Date of encounter: 12/22/22              This service was not originating from a related E/M service provided within the previous 7 days nor will  to an E/M service or procedure within the next 24 hours or my soonest available appointment.  Prevailing standard of care was able to be met in this audio-only visit.

## 2023-01-06 RX ORDER — IBUPROFEN 800 MG/1
TABLET ORAL
Qty: 90 TABLET | Refills: 1 | Status: SHIPPED | OUTPATIENT
Start: 2023-01-06 | End: 2023-04-06

## 2023-01-06 NOTE — TELEPHONE ENCOUNTER
Pt should have been scheduled a four month return to clinic from visit on 12/22/22 that has not yet been scheduled. Please contact to schedule accordingly. Thanks

## 2023-02-08 ENCOUNTER — TELEPHONE (OUTPATIENT)
Dept: CARDIOLOGY | Facility: CLINIC | Age: 53
End: 2023-02-08

## 2023-02-08 NOTE — TELEPHONE ENCOUNTER
[facility] PAF application received for renewal.   A new application was submitted to [facility] 12/22. That application was rejected by [facility] because it was too soon. He had a current approval through 3/21/23. I attempted to contact patient to scheduled a follow up appointment. No answer, VM message left for patient to return call. [facility] 2/22. Patient was supposed to be seen in 6 months. There were no appointments scheduled. The denial and the application from December is scanned into the patient's chart in media. A new 90 day prescription will need to be sent to [facility] with application once patient has scheduled an appointment.

## 2023-02-09 ENCOUNTER — TELEPHONE (OUTPATIENT)
Dept: CARDIOLOGY | Facility: CLINIC | Age: 53
End: 2023-02-09

## 2023-02-09 DIAGNOSIS — I50.31 ACUTE DIASTOLIC HEART FAILURE: Primary | ICD-10-CM

## 2023-02-09 RX ORDER — SACUBITRIL AND VALSARTAN 97; 103 MG/1; MG/1
1 TABLET, FILM COATED ORAL 2 TIMES DAILY
Qty: 180 TABLET | Refills: 3 | Status: SHIPPED | OUTPATIENT
Start: 2023-02-09 | End: 2023-05-22 | Stop reason: SDUPTHER

## 2023-02-09 NOTE — TELEPHONE ENCOUNTER
[employer] application re-sent with new Rx for Entresto. Pt aware. He will call them to check status in a few weeks.

## 2023-02-10 ENCOUNTER — OFFICE VISIT (OUTPATIENT)
Dept: CARDIOLOGY | Facility: CLINIC | Age: 53
End: 2023-02-10

## 2023-02-10 VITALS
HEART RATE: 81 BPM | SYSTOLIC BLOOD PRESSURE: 152 MMHG | OXYGEN SATURATION: 99 % | BODY MASS INDEX: 43.28 KG/M2 | HEIGHT: 66 IN | WEIGHT: 269.31 LBS | DIASTOLIC BLOOD PRESSURE: 90 MMHG | RESPIRATION RATE: 17 BRPM

## 2023-02-10 DIAGNOSIS — I10 BENIGN ESSENTIAL HYPERTENSION: ICD-10-CM

## 2023-02-10 DIAGNOSIS — K43.9 HERNIA OF ANTERIOR ABDOMINAL WALL: ICD-10-CM

## 2023-02-10 DIAGNOSIS — I25.10 ARTERIOSCLEROSIS OF CORONARY ARTERY: Primary | ICD-10-CM

## 2023-02-10 DIAGNOSIS — E11.65 UNCONTROLLED TYPE 2 DIABETES MELLITUS WITH HYPERGLYCEMIA: ICD-10-CM

## 2023-02-10 DIAGNOSIS — E78.2 MIXED HYPERLIPIDEMIA: ICD-10-CM

## 2023-02-10 DIAGNOSIS — I50.22 CHRONIC SYSTOLIC HEART FAILURE: ICD-10-CM

## 2023-02-10 PROCEDURE — 99214 OFFICE O/P EST MOD 30 MIN: CPT | Mod: PBBFAC | Performed by: INTERNAL MEDICINE

## 2023-02-10 NOTE — LETTER
February 10, 2023      [facility]  [address] [place] [id number]  Phone: [phone]       Patient: Mynor Recio   YOB: 1970  Date of Visit: 02/10/2023    To Whom It May Concern:    TICO Recio  was at [facility] on 02/10/2023. The patient may return to work/school on 02- with no restrictions. If you have any questions or concerns, or if I can be of further assistance, please do not hesitate to contact me.    Sincerely,    Dr. Delgado

## 2023-02-10 NOTE — PROGRESS NOTES
02/10/2023 11:04 AM    Subjective:     CHIEF COMPLAINT:   Chief Complaint   Patient presents with    Follow-up     F/u- no cardiac complaints; ran out of entresto 2 days ago                           HPI:    Mr. Mynor Recio is a 52 y.o. male.   The patient comes for a follow-up appointment.  The patient has history of HFrEF    CP:  The patient has no chest discomfort.      SOB:  The patient denies shortness of breath No HALL    EDEMA:  The patient denies edema.        ORTHOPNEA:  The patient denies orthopnea.  No PND.      SYNCOPE:  The patient denies near syncope.  No syncope.   Denies getting lightheaded.    PALPITATIONS:  The patient has no palpitations.    LEVEL OF EXERTION:  The patient works and does not have symptoms with this level of exertion.  The patient's level of exertion is fair.    Past Medical History    Patient Active Problem List   Diagnosis    Arteriosclerosis of coronary artery    Benign essential hypertension    Chronic systolic heart failure    Depressive disorder    Diabetes mellitus    Hernia of anterior abdominal wall    Hyperlipidemia    Class 3 severe obesity due to excess calories with serious comorbidity and body mass index (BMI) of 45.0 to 49.9 in adult    Neuropathy    Uncontrolled type 2 diabetes mellitus with hyperglycemia    Onychomycosis of multiple toenails with type 2 diabetes mellitus    Tinea unguium       Surgical History    Past Surgical History:   Procedure Laterality Date    TONSILLECTOMY         Social History    Social History     Socioeconomic History    Marital status:      Spouse name: Izaiah   Tobacco Use    Smoking status: Never    Smokeless tobacco: Never   Substance and Sexual Activity    Alcohol use: Never    Drug use: Never     Social Determinants of Health     Transportation Needs: No Transportation Needs    Lack of Transportation (Medical): No    Lack of Transportation (Non-Medical): No   Social Connections: Moderately Isolated    Frequency of  Communication with Friends and Family: Once a week    Frequency of Social Gatherings with Friends and Family: Twice a week    Attends Yazidi Services: Never    Active Member of Clubs or Organizations: No    Attends Club or Organization Meetings: Never    Marital Status:    Housing Stability: Low Risk     Unable to Pay for Housing in the Last Year: No    Number of Places Lived in the Last Year: 1    Unstable Housing in the Last Year: No       Family History    Family History   Family history unknown: Yes       Allergy  Review of patient's allergies indicates:  No Known Allergies    Current Medications    Current Outpatient Medications:     aspirin (ECOTRIN) 81 MG EC tablet, Take 81 mg by mouth., Disp: , Rfl:     atorvastatin (LIPITOR) 80 MG tablet, Take 1 tablet (80 mg total) by mouth once daily., Disp: 90 tablet, Rfl: 1    clopidogreL (PLAVIX) 75 mg tablet, Take 75 mg by mouth., Disp: , Rfl:     ezetimibe (ZETIA) 10 mg tablet, Take 1 tablet (10 mg total) by mouth once daily., Disp: 90 tablet, Rfl: 1    fenofibrate (TRICOR) 145 MG tablet, Take 1 tablet (145 mg total) by mouth once daily., Disp: 180 tablet, Rfl: 1    finasteride (PROSCAR) 5 mg tablet, TAKE ONE TABLET BY MOUTH EVERY DAY, Disp: 90 tablet, Rfl: 1    FLUoxetine 20 MG capsule, Take 1 capsule (20 mg total) by mouth once daily., Disp: 90 capsule, Rfl: 1    glimepiride (AMARYL) 4 MG tablet, Take 1 tablet (4 mg total) by mouth 2 (two) times daily with meals., Disp: 180 tablet, Rfl: 1    JANUVIA 100 mg Tab, Take 1 tablet (100 mg total) by mouth once daily., Disp: 90 tablet, Rfl: 1    metFORMIN (GLUCOPHAGE) 1000 MG tablet, Take 1 tablet (1,000 mg total) by mouth 2 (two) times daily with meals., Disp: 180 tablet, Rfl: 1    metoprolol succinate (TOPROL-XL) 50 MG 24 hr tablet, Take 1 tablet (50 mg total) by mouth once daily., Disp: 90 tablet, Rfl: 1    cyclobenzaprine (FLEXERIL) 10 MG tablet, TAKE ONE TABLET BY MOUTH TWICE A DAY, Disp: 180 tablet, Rfl:  "1    ibuprofen (ADVIL,MOTRIN) 800 MG tablet, TAKE ONE TABLET BY MOUTH TWICE A DAY AS NEEDED FOR PAIN, Disp: 90 tablet, Rfl: 1    sacubitriL-valsartan (ENTRESTO)  mg per tablet, Take 1 tablet by mouth 2 (two) times daily. (Patient not taking: Reported on 2/10/2023), Disp: 180 tablet, Rfl: 3    UNABLE TO FIND, MYCOTIC TOENAIL POLISH (PATIENT WILL PURCHASE OUT-OF-POCKET):  Ibuprofen 2%, Itraconazole 3%, and Vancomycin 2%.  Apply to clean and dry affected toenails twice a day (2 ml)., Disp: 1 each, Rfl: 5    ROS:   Please see HPI                   Objective:     PE:  Blood pressure (!) 152/90, pulse 81, resp. rate 17, height 5' 6" (1.676 m), weight 122.2 kg (269 lb 4.8 oz), SpO2 99 %.   BP Readings from Last 3 Encounters:   02/10/23 (!) 152/90   10/31/22 (!) 143/82   09/19/22 (!) 150/74       Wt Readings from Last 3 Encounters:   02/10/23 122.2 kg (269 lb 4.8 oz)   09/01/22 124.2 kg (273 lb 13 oz)   06/21/21 121.3 kg (267 lb 6.7 oz)     BMI 43.47 kg/m^2    GENERAL:  Ambulates  CONSTITUTIONAL:  No acute distress.  Not ill appearing.  NECK:  No cervical adenopathy.  No carotid bruit.  CARDIOVASCULAR:  Normal rate.  Regular rhythm.  No murmur.  PULMONARY:  No respiratory distress.  No wheezing.  No crackles.  ABDOMINAL:  No distention.  No tenderness.  Obese.  Firm.   MUSCULOSKELETAL:  No deformity.  Trace edema bilat  SKIN:  No bruising.  No rash.  NEUROLOGICAL:  Oriented x 3.  No weakness.                                                                                                                                                                                                                                                                                                                                                                                                                                                                               CARDIAC TESTING:  EKG  No results found for this or any previous " visit.  Echocardiogram  No results found for this or any previous visit.    Stress Test  No results found for this or any previous visit.     Coronary Angiogram  Results for orders placed in visit on 07/03/19      Last BMP  BMP  Lab Results   Component Value Date     11/29/2022    K 4.3 11/29/2022    CO2 24 11/29/2022    BUN 14.9 11/29/2022    CREATININE 0.88 11/29/2022    CALCIUM 9.7 11/29/2022    EGFRNORACEVR >60 11/29/2022     Last CBC     Lab Results   Component Value Date    WBC 8.5 11/29/2022    HGB 12.8 (L) 11/29/2022    HCT 36.2 (L) 11/29/2022    MCV 92.3 11/29/2022     11/29/2022         Last lipids    Lab Results   Component Value Date    CHOL 134 04/12/2022    CHOL 137 03/03/2022    CHOL 145 03/11/2021     Lab Results   Component Value Date    HDL 35 04/12/2022    HDL 39 03/03/2022    HDL 39 03/11/2021     No results found for: LDLCALC  Lab Results   Component Value Date    TRIG 200 04/12/2022    TRIG 150 03/03/2022    TRIG 125 03/11/2021     No results found for: CHOLHDL    Assessment:     1. Arteriosclerosis of coronary artery    2. Benign essential hypertension    3. Chronic systolic heart failure    4. Mixed hyperlipidemia    5. Uncontrolled type 2 diabetes mellitus with hyperglycemia    6. Hernia of anterior abdominal wall    Body mass index is 43.47 kg/m².  Patient has super morbid obesity (BMI >39.9)  The patient is on GDMT   Tobacco:  denied  Alcohol:  none  Substance abuse:  none   Last PCP visit:  12/22/2022      Plan:     Medications:  continue cardiac medications    Diet:  Avoid processed foods and drinks, sugar, salt, fried/greasy foods, and fast foods      Diet:  Recommend 10 servings of fruits and vegetables per day    Exercise:  on regular basis    Nurse visit for BP check (after patient resumes entresto)     Follow up:  4 months    Raghavendra Delgado MD      Future Appointments   Date Time Provider Department Center   5/15/2023 12:00 PM CECILE [facility]  Rahul Paniagua

## 2023-02-13 ENCOUNTER — HOSPITAL ENCOUNTER (EMERGENCY)
Facility: HOSPITAL | Age: 53
Discharge: HOME | End: 2023-02-13
Attending: EMERGENCY MEDICINE

## 2023-02-13 VITALS
WEIGHT: 273.38 LBS | OXYGEN SATURATION: 99 % | TEMPERATURE: 98 F | DIASTOLIC BLOOD PRESSURE: 100 MMHG | BODY MASS INDEX: 43.93 KG/M2 | HEART RATE: 97 BPM | HEIGHT: 66 IN | RESPIRATION RATE: 18 BRPM | SYSTOLIC BLOOD PRESSURE: 177 MMHG

## 2023-02-13 DIAGNOSIS — M54.31 SCIATICA OF RIGHT SIDE: Primary | ICD-10-CM

## 2023-02-13 DIAGNOSIS — R53.1 WEAKNESS: ICD-10-CM

## 2023-02-13 PROCEDURE — 93005 ELECTROCARDIOGRAM TRACING: CPT

## 2023-02-13 PROCEDURE — 99284 EMERGENCY DEPT VISIT MOD MDM: CPT

## 2023-02-13 RX ORDER — TIZANIDINE 2 MG/1
2 TABLET ORAL EVERY 8 HOURS
Qty: 15 TABLET | Refills: 0 | Status: SHIPPED | OUTPATIENT
Start: 2023-02-13 | End: 2023-02-18

## 2023-02-13 RX ORDER — HYDROCODONE BITARTRATE AND ACETAMINOPHEN 5; 325 MG/1; MG/1
1 TABLET ORAL EVERY 8 HOURS PRN
Qty: 15 TABLET | Refills: 0 | Status: SHIPPED | OUTPATIENT
Start: 2023-02-13 | End: 2023-02-18

## 2023-02-13 NOTE — ED PROVIDER NOTES
Encounter Date: 2/13/2023       History     Chief Complaint   Patient presents with    Extremity Weakness     Rt leg numbness.  X 3 days, states he is out of Entresto.      Mynor Recio is a 52 y.o. male who presents to the ED with complaints of right leg pain that started 3 day(s) ago. 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 States the pain begins in the R lateral hip and radiates down his entire leg. He denies weakness in the leg. Denies injury. He denies loss of bowel or bladder incontinence. He states he saw Cardiology (Dr. Palm) last week and is waiting for his Entresto to be delivered to his house. He denies lower extremity swelling and calf pain. Denies redness to the extremity. He denies shortness of breath and chest pain.        The history is provided by the patient. No  was used.   Review of patient's allergies indicates:  No Known Allergies  Past Medical History:   Diagnosis Date    Arteriosclerosis of coronary artery 9/1/2022    Benign essential hypertension 9/1/2022    Chronic systolic heart failure 9/1/2022    Depressive disorder 9/1/2022    Diabetes mellitus, type 2     Hernia of anterior abdominal wall 9/1/2022    Hyperlipidemia     Hypertension     Neuropathy 9/1/2022    Onychomycosis of multiple toenails with type 2 diabetes mellitus 9/19/2022    Tinea unguium 9/19/2022     Past Surgical History:   Procedure Laterality Date    TONSILLECTOMY       Family History   Family history unknown: Yes     Social History     Tobacco Use    Smoking status: Never    Smokeless tobacco: Never   Substance Use Topics    Alcohol use: Never    Drug use: Never     Review of Systems   Constitutional:  Negative for chills, fatigue and fever.   HENT:  Negative for congestion, ear pain, sinus pain and sore throat.    Eyes:  Negative for pain.   Respiratory:  Negative for cough, chest tightness and shortness of breath.    Cardiovascular:  Negative for chest pain.    Gastrointestinal:  Negative for abdominal pain, constipation, diarrhea, nausea and vomiting.   Genitourinary:  Negative for dysuria.   Musculoskeletal:  Positive for arthralgias and myalgias. Negative for back pain and joint swelling.   Skin:  Negative for color change and rash.   Neurological:  Negative for dizziness, syncope, weakness, light-headedness, numbness and headaches.   Psychiatric/Behavioral:  Negative for behavioral problems and confusion.      Physical Exam     Initial Vitals [02/13/23 1506]   BP Pulse Resp Temp SpO2   (!) 177/100 97 18 97.6 °F (36.4 °C) 99 %      MAP       --         Physical Exam    Nursing note and vitals reviewed.  Constitutional: He appears well-developed and well-nourished.   HENT:   Head: Normocephalic and atraumatic.   Eyes: Conjunctivae and EOM are normal. Pupils are equal, round, and reactive to light.   Neck: Neck supple. No JVD present.   Normal range of motion.  Cardiovascular:  Normal rate, regular rhythm, normal heart sounds and intact distal pulses.           No murmur heard.  Pulmonary/Chest: Breath sounds normal. No respiratory distress. He has no wheezes. He has no rhonchi. He has no rales.   Abdominal: Abdomen is soft. Bowel sounds are normal. He exhibits no distension. There is no abdominal tenderness. There is no rebound and no guarding.   Musculoskeletal:      Cervical back: Normal range of motion and neck supple.      Lumbar back: Spasms and tenderness present. No swelling. Positive right straight leg raise test.      Right hip: Tenderness (tenderness to lateral hip over greater trochanter) present. No deformity or bony tenderness. Normal range of motion. Normal strength.     Lymphadenopathy:     He has no cervical adenopathy.   Neurological: He is alert and oriented to person, place, and time.   Skin: Skin is warm. Capillary refill takes less than 2 seconds.   Psychiatric: He has a normal mood and affect. Thought content normal.       ED Course    Procedures  Labs Reviewed - No data to display     ECG Results              EKG 12-lead (Weakness) Age > 50 (In process)  Result time 02/13/23 15:21:03      In process by Interface, Lab In [facility] (02/13/23 15:21:03)                   Narrative:    Test Reason : R53.1,    Vent. Rate : 095 BPM     Atrial Rate : 095 BPM     P-R Int : 144 ms          QRS Dur : 088 ms      QT Int : 334 ms       P-R-T Axes : 043 038 010 degrees     QTc Int : 419 ms    Normal sinus rhythm  Normal ECG  No previous ECGs available    Referred By:             Confirmed By:                                   Imaging Results              X-Ray Lumbar Spine Ap And Lateral (Final result)  Result time 02/13/23 16:54:07      Final result by Olivia Dobbins MD (02/13/23 16:54:07)                   Impression:      1. No acute abnormality identified.  2. Mild degenerative changes of the lumbar spine.      Electronically signed by: Olivia Dobbins  Date:    02/13/2023  Time:    16:54               Narrative:    EXAMINATION:  XR LUMBAR SPINE AP AND LATERAL    CLINICAL HISTORY:  low back pain;    COMPARISON:  None.    FINDINGS:  There are 5 non-rib-bearing lumbar type vertebral bodies.  There is grade 1 anterolisthesis of L4 over L5.  The vertebral heights are maintained.  There is mild disc height loss at L1-L2 with small multilevel marginal osteophyte formation.  There is mild facet arthropathy in the lower lumbar spine.  Vascular calcifications are noted.                                       X-Ray Hip 2 or 3 views Right (with Pelvis when performed) (Final result)  Result time 02/13/23 16:54:42      Final result by Olivia Dobbins MD (02/13/23 16:54:42)                   Impression:      No displaced fracture identified.      Electronically signed by: Olivia Dobbins  Date:    02/13/2023  Time:    16:54               Narrative:    EXAMINATION:  XR HIP WITH PELVIS WHEN PERFORMED, 2 OR 3  VIEWS RIGHT    CLINICAL HISTORY:  right hip  pain;    COMPARISON:  None.    FINDINGS:  There is no displaced fracture identified.  The soft tissues are unremarkable.                                       Medications - No data to display              ED Course as of 02/13/23 1716 Mon Feb 13, 2023 1711 Reassessed patient at this time. He is sitting comfortably in the exam chair. Discussed xray findings, physical exam, diagnosis, and treatment plan with him. Patient's physical exam is consistent with sciatica. He has gabapentin at home that he will start and I will rx tizanidine and Norco. He verbalied understanding. Strict ED precautions given. Stable for discharge.  [VJ]      ED Course User Index  [VJ] Catherine Dewitt PA-C                 Clinical Impression:   Final diagnoses:  [R53.1] Weakness  [M54.31] Sciatica of right side (Primary)        ED Disposition Condition    Discharge Stable          ED Prescriptions       Medication Sig Dispense Start Date End Date Auth. Provider    HYDROcodone-acetaminophen (NORCO) 5-325 mg per tablet Take 1 tablet by mouth every 8 (eight) hours as needed for Pain. 15 tablet 2/13/2023 2/18/2023 Catherine Dewitt PA-C    tiZANidine (ZANAFLEX) 2 MG tablet Take 1 tablet (2 mg total) by mouth every 8 (eight) hours. for 5 days 15 tablet 2/13/2023 2/18/2023 Catherine Dewitt PA-C          Follow-up Information       Follow up With Specialties Details Why Contact Info    CECILE Mcmanus Nurse Practitioner   [address] [facility]  Internal Medicine Clinic  [facility] [id number] [phone]      [facility] - Emergency Dept Emergency Medicine In 3 days As needed, If symptoms worsen [address] [facility] [address] [phone]             Catherine Dewitt PA-C  02/13/23 1716

## 2023-02-13 NOTE — Clinical Note
"Mynor"BONITA Recio was seen and treated in our emergency department on 2/13/2023.  He may return to work on 02/15/2023.       If you have any questions or concerns, please don't hesitate to call.      Catherine Dewitt PA-C"

## 2023-02-19 ENCOUNTER — HOSPITAL ENCOUNTER (EMERGENCY)
Facility: HOSPITAL | Age: 53
Discharge: HOME | End: 2023-02-19
Attending: EMERGENCY MEDICINE

## 2023-02-19 VITALS
RESPIRATION RATE: 18 BRPM | HEART RATE: 88 BPM | OXYGEN SATURATION: 97 % | SYSTOLIC BLOOD PRESSURE: 168 MMHG | HEIGHT: 66 IN | WEIGHT: 274.5 LBS | TEMPERATURE: 99 F | DIASTOLIC BLOOD PRESSURE: 99 MMHG | BODY MASS INDEX: 44.11 KG/M2

## 2023-02-19 DIAGNOSIS — M25.561 RIGHT KNEE PAIN: ICD-10-CM

## 2023-02-19 PROCEDURE — 63600175 PHARM REV CODE 636 W HCPCS: Performed by: PHYSICIAN ASSISTANT

## 2023-02-19 PROCEDURE — 96372 THER/PROPH/DIAG INJ SC/IM: CPT | Performed by: PHYSICIAN ASSISTANT

## 2023-02-19 PROCEDURE — 99284 EMERGENCY DEPT VISIT MOD MDM: CPT | Mod: 25

## 2023-02-19 RX ORDER — KETOROLAC TROMETHAMINE 30 MG/ML
30 INJECTION, SOLUTION INTRAMUSCULAR; INTRAVENOUS
Status: COMPLETED | OUTPATIENT
Start: 2023-02-19 | End: 2023-02-19

## 2023-02-19 RX ORDER — DICLOFENAC SODIUM 10 MG/G
2 GEL TOPICAL 2 TIMES DAILY
Qty: 200 G | Refills: 0 | Status: SHIPPED | OUTPATIENT
Start: 2023-02-19 | End: 2023-02-24

## 2023-02-19 RX ORDER — PREDNISONE 20 MG/1
10 TABLET ORAL DAILY
Qty: 3 TABLET | Refills: 0 | Status: SHIPPED | OUTPATIENT
Start: 2023-02-19 | End: 2023-02-24

## 2023-02-19 RX ORDER — DICLOFENAC SODIUM 50 MG/1
50 TABLET, DELAYED RELEASE ORAL 2 TIMES DAILY
Qty: 10 TABLET | Refills: 0 | Status: SHIPPED | OUTPATIENT
Start: 2023-02-19 | End: 2023-04-06 | Stop reason: ALTCHOICE

## 2023-02-19 RX ORDER — PREDNISONE 10 MG/1
40 TABLET ORAL ONCE
Status: COMPLETED | OUTPATIENT
Start: 2023-02-19 | End: 2023-02-19

## 2023-02-19 RX ADMIN — PREDNISONE 40 MG: 10 TABLET ORAL at 07:02

## 2023-02-19 RX ADMIN — KETOROLAC TROMETHAMINE 30 MG: 30 INJECTION, SOLUTION INTRAMUSCULAR; INTRAVENOUS at 07:02

## 2023-02-20 NOTE — DISCHARGE INSTRUCTIONS
Take medications as prescribed as needed for pain with food and plenty of water.   Apply heating pad to sore muscles ( being careful not to burn skin).  Soak in warm epsom salt baths for 20-30 minutes daily to help with sore and aching muscles.  Avoid lifting heavy.

## 2023-02-20 NOTE — ED PROVIDER NOTES
Encounter Date: 2/19/2023       History     Chief Complaint   Patient presents with    Knee Pain     Right knee pain x10 days no injury. Had xray recently for same. Ambulatory to triage.      50-year-old male presents emergency department with complaints of right knee pain x 10 days.  He was recently seen for the same complaint and prescribed Norco & muscle relaxers however he states they have not helped with the pain and he believes he needs an anti-inflammatory.  He is requesting a few days of oral steroids to help with symptoms.  He denies injury, leg swelling, redness, tingling, weakness, shortness of breath.    The history is provided by the patient. No  was used.   Review of patient's allergies indicates:  No Known Allergies  Past Medical History:   Diagnosis Date    Arteriosclerosis of coronary artery 9/1/2022    Benign essential hypertension 9/1/2022    Chronic systolic heart failure 9/1/2022    Depressive disorder 9/1/2022    Diabetes mellitus, type 2     Hernia of anterior abdominal wall 9/1/2022    Hyperlipidemia     Hypertension     Neuropathy 9/1/2022    Onychomycosis of multiple toenails with type 2 diabetes mellitus 9/19/2022    Tinea unguium 9/19/2022     Past Surgical History:   Procedure Laterality Date    TONSILLECTOMY       Family History   Family history unknown: Yes     Social History     Tobacco Use    Smoking status: Never    Smokeless tobacco: Never   Substance Use Topics    Alcohol use: Never    Drug use: Never     Review of Systems   Constitutional:  Negative for chills and fever.   Respiratory:  Negative for cough and shortness of breath.    Cardiovascular:  Negative for chest pain, palpitations and leg swelling.   Gastrointestinal:  Negative for abdominal pain, nausea and vomiting.   Genitourinary:  Negative for dysuria and flank pain.   Musculoskeletal:  Negative for back pain and neck pain.        Right knee pain   Skin:  Negative for color change and rash.    Neurological:  Negative for dizziness, weakness, light-headedness and headaches.     Physical Exam     Initial Vitals [02/19/23 1728]   BP Pulse Resp Temp SpO2   (!) 168/99 88 18 98.6 °F (37 °C) 97 %      MAP       --         Physical Exam    Nursing note and vitals reviewed.  Constitutional: He appears well-developed and well-nourished.   HENT:   Nose: Nose normal.   Mouth/Throat: Oropharynx is clear and moist.   Eyes: Conjunctivae are normal.   Neck: Neck supple.   Normal range of motion.  Cardiovascular:  Normal rate, normal heart sounds and intact distal pulses.           Pulmonary/Chest: Breath sounds normal.   Abdominal: Abdomen is soft. Bowel sounds are normal. There is no abdominal tenderness.   Musculoskeletal:         General: No edema. Normal range of motion.      Cervical back: Normal range of motion and neck supple.      Right knee: No swelling, deformity, effusion, erythema, ecchymosis or bony tenderness. Normal range of motion.     Neurological: He is alert. GCS eye subscore is 4. GCS verbal subscore is 5. GCS motor subscore is 6.   Skin: Skin is warm. Capillary refill takes less than 2 seconds. No rash noted. No erythema.       ED Course   Procedures  Labs Reviewed - No data to display       Imaging Results    None          Medications   ketorolac injection 30 mg (30 mg Intramuscular Given 2/19/23 1941)   predniSONE tablet 40 mg (40 mg Oral Given 2/19/23 1941)     Medical Decision Making:   Initial Assessment:   50-year-old male presents emergency department with complaints of right knee pain x 10 days.  Differential Diagnosis:   Knee sprain, arthritis  ED Management:  Patient declined imaging, states he just wants medication for the pain.    Physical exam unremarkable.  30 mg Toradol IM and 40 mg prednisone p.o. given for pain and inflammation.  Oral and topical NSAIDs with a few days of steroids sent to his pharmacy.  The patient is resting comfortably and in no acute distress.  He states that  his symptoms have improved after treatment in Emergency Department. I personally discussed his test results and treatment plan.  Gave strict ED precautions and specific conditions for return to the emergency department and importance of follow up with pcp.  Patient voices understanding and agrees to the plan discussed. All patients' questions have been answered at this time. He has remained hemodynamically stable throughout entire stay in ED and is stable for discharge home.                         Clinical Impression:   Final diagnoses:  [M25.561] Right knee pain        ED Disposition Condition    Discharge Stable          ED Prescriptions       Medication Sig Dispense Start Date End Date Auth. Provider    predniSONE (DELTASONE) 20 MG tablet Take 0.5 tablets (10 mg total) by mouth once daily. for 5 days 3 tablet 2/19/2023 2/24/2023 AIDEN Lopez    diclofenac sodium (VOLTAREN) 1 % Gel Apply 2 g topically 2 (two) times daily. For lower extremity joints, apply 4 g on joint up to 4x a day.  For upper extremity joints, apply 2 g on joint up to 4x a day.  Do not use this at the same time as taking an oral NSAID such as Diclofenac, Ibuprofen, Mobic, Advil, Aleve, Naproxen etc.  However you can alternate this topical NSAID with oral NSAID.   You can take Tylenol while using this medication. for 5 days 200 g 2/19/2023 2/24/2023 AIDEN Lopez    diclofenac (VOLTAREN) 50 MG EC tablet Take 1 tablet (50 mg total) by mouth 2 (two) times daily. for 5 days 10 tablet 2/19/2023 2/24/2023 AIDEN Lopez          Follow-up Information       Follow up With Specialties Details Why Contact Info    [facility] - Emergency Dept Emergency Medicine  As needed, If symptoms worsen [address] [facility] [address] [phone]             AIDEN Lopez  02/20/23 0105     1. Suggest: PO diet order: Regular, Consistent Carbohydrate (no snacks), DASH/TLC (cholesterol and sodium restricted), Halal;            2. Encourage & assist Pt with meals; Monitor PO diet tolerance;            3. Monitor labs, weights, hydration status;/General/healthful diet/Other (specify)

## 2023-05-15 ENCOUNTER — CLINICAL SUPPORT (OUTPATIENT)
Dept: INTERNAL MEDICINE | Facility: CLINIC | Age: 53
End: 2023-05-15
Attending: NURSE PRACTITIONER

## 2023-05-15 ENCOUNTER — OFFICE VISIT (OUTPATIENT)
Dept: INTERNAL MEDICINE | Facility: CLINIC | Age: 53
End: 2023-05-15

## 2023-05-15 VITALS
SYSTOLIC BLOOD PRESSURE: 123 MMHG | WEIGHT: 261.38 LBS | TEMPERATURE: 98 F | BODY MASS INDEX: 42.01 KG/M2 | DIASTOLIC BLOOD PRESSURE: 79 MMHG | RESPIRATION RATE: 20 BRPM | HEIGHT: 66 IN | HEART RATE: 80 BPM

## 2023-05-15 DIAGNOSIS — E66.01 CLASS 3 SEVERE OBESITY DUE TO EXCESS CALORIES WITH SERIOUS COMORBIDITY AND BODY MASS INDEX (BMI) OF 40.0 TO 44.9 IN ADULT: ICD-10-CM

## 2023-05-15 DIAGNOSIS — F32.A DEPRESSIVE DISORDER: ICD-10-CM

## 2023-05-15 DIAGNOSIS — Z13.5 ENCOUNTER FOR SCREENING FOR DIABETIC RETINOPATHY: ICD-10-CM

## 2023-05-15 DIAGNOSIS — I10 BENIGN ESSENTIAL HYPERTENSION: ICD-10-CM

## 2023-05-15 DIAGNOSIS — Z13.5 ENCOUNTER FOR SCREENING FOR DIABETIC RETINOPATHY: Primary | ICD-10-CM

## 2023-05-15 DIAGNOSIS — Z12.11 SCREENING FOR MALIGNANT NEOPLASM OF COLON: ICD-10-CM

## 2023-05-15 DIAGNOSIS — E11.65 UNCONTROLLED TYPE 2 DIABETES MELLITUS WITH HYPERGLYCEMIA: ICD-10-CM

## 2023-05-15 LAB
LEFT EYE DM RETINOPATHY: NEGATIVE
LEFT EYE DM RETINOPATHY: NEGATIVE
RIGHT EYE DM RETINOPATHY: NEGATIVE
RIGHT EYE DM RETINOPATHY: NEGATIVE

## 2023-05-15 PROCEDURE — 92228 IMG RTA DETC/MNTR DS PHY/QHP: CPT | Mod: PBBFAC

## 2023-05-15 PROCEDURE — 99214 OFFICE O/P EST MOD 30 MIN: CPT | Mod: PBBFAC | Performed by: NURSE PRACTITIONER

## 2023-05-15 PROCEDURE — 99214 OFFICE O/P EST MOD 30 MIN: CPT | Mod: S$PBB,,, | Performed by: NURSE PRACTITIONER

## 2023-05-15 PROCEDURE — 99214 PR OFFICE/OUTPT VISIT, EST, LEVL IV, 30-39 MIN: ICD-10-PCS | Mod: S$PBB,,, | Performed by: NURSE PRACTITIONER

## 2023-05-15 RX ORDER — FINASTERIDE 5 MG/1
5 TABLET, FILM COATED ORAL DAILY
Qty: 90 TABLET | Refills: 1 | Status: SHIPPED | OUTPATIENT
Start: 2023-05-15 | End: 2023-06-15 | Stop reason: SDUPTHER

## 2023-05-15 RX ORDER — METFORMIN HYDROCHLORIDE 1000 MG/1
1000 TABLET ORAL 2 TIMES DAILY WITH MEALS
Qty: 180 TABLET | Refills: 1 | Status: SHIPPED | OUTPATIENT
Start: 2023-05-15 | End: 2023-06-15 | Stop reason: SDUPTHER

## 2023-05-15 RX ORDER — GLIMEPIRIDE 4 MG/1
4 TABLET ORAL 2 TIMES DAILY WITH MEALS
Qty: 180 TABLET | Refills: 1 | Status: SHIPPED | OUTPATIENT
Start: 2023-05-15 | End: 2023-06-15 | Stop reason: SDUPTHER

## 2023-05-15 RX ORDER — ATORVASTATIN CALCIUM 80 MG/1
80 TABLET, FILM COATED ORAL DAILY
Qty: 90 TABLET | Refills: 1 | Status: SHIPPED | OUTPATIENT
Start: 2023-05-15 | End: 2023-06-15 | Stop reason: SDUPTHER

## 2023-05-15 RX ORDER — SITAGLIPTIN 100 MG/1
100 TABLET, FILM COATED ORAL DAILY
Qty: 90 TABLET | Refills: 1 | Status: SHIPPED | OUTPATIENT
Start: 2023-05-15 | End: 2023-06-15 | Stop reason: SDUPTHER

## 2023-05-15 RX ORDER — EZETIMIBE 10 MG/1
10 TABLET ORAL DAILY
Qty: 90 TABLET | Refills: 1 | Status: SHIPPED | OUTPATIENT
Start: 2023-05-15 | End: 2023-06-15 | Stop reason: SDUPTHER

## 2023-05-15 RX ORDER — FENOFIBRATE 145 MG/1
145 TABLET, FILM COATED ORAL DAILY
Qty: 180 TABLET | Refills: 1 | Status: SHIPPED | OUTPATIENT
Start: 2023-05-15 | End: 2023-06-15 | Stop reason: SDUPTHER

## 2023-05-15 RX ORDER — FLUOXETINE HYDROCHLORIDE 20 MG/1
20 CAPSULE ORAL DAILY
Qty: 90 CAPSULE | Refills: 1 | Status: SHIPPED | OUTPATIENT
Start: 2023-05-15 | End: 2023-06-15 | Stop reason: SDUPTHER

## 2023-05-15 NOTE — PROGRESS NOTES
Sadia Morillo, CECILE   [facility]  [address] [facility] [address]      PATIENT NAME: Mynor Recio  : 1970  DATE: 5/15/23  MRN: [id number]      Reason for Visit / Chief Complaint: Follow-up (Lab results, refused vaccines)       History of Present Illness / Problem Focused Workflow     Mynor Recio is a 53 y.o. White male presents to the clinic for DM and HTN f/u. PMH diabetes, hypertension, HLD, HFrEF (LVEF 30-35%; 2019), and s/p PCI with stent x2 (OM1 and mid circumflex; 2019), ventral hernia, morbid obesity, depression, and tobacco abuse. Followed by [facility] cardio clinic.     Pt reported to ED on  with c/o right hip/leg pain x 3 days. XR of hip unremarkable and lumbar revealed mild OA. Pt was given norco and zanaflex without improvement and reported back to ED again in 23. Pt was given oral steroids with improvement which he denies today. Reports med compliance. States had been out of meds x 3 weeks in March. Has lost 13 lbs recently. Does not check CBGs. Attempts ADA/cardiac diet. Mood stable with meds. Declines vaccines today d/t costs. Denies chest pain, shortness of breath, cough, fever, headache, dizziness, weakness, abdominal pain, nausea, vomiting, diarrhea, constipation, dysuria, depression, anxiety, SI/HI.    Review of Systems     Review of Systems     See HPI for details    Constitutional: Denies Change in appetite. Denies Chills. Denies Fever. Denies Night sweats.  Eye: Denies Blurred vision. Denies Discharge. Denies Eye pain.  ENT: Denies Decreased hearing. Denies Sore throat. Denies Swollen glands.  Respiratory: Denies Cough. Denies Shortness of breath. Denies Shortness of breath with exertion. Denies Wheezing.  Cardiovascular: Denies Chest pain at rest. Denies Chest pain with exertion. Denies Irregular heartbeat. Denies Palpitations. Denies Edema.  Gastrointestinal: Denies Abdominal pain.  Denies Diarrhea. Denies Nausea. Denies Vomiting. Denies Hematemesis or Hematochezia.  Genitourinary: Denies Dysuria. Denies Urinary frequency. Denies Urinary urgency. Denies Blood in urine.  Endocrine: Denies Cold intolerance. Denies Excessive thirst. Denies Heat intolerance. Denies Weight loss. Denies Weight gain.  Musculoskeletal: Denies Painful joints. Denies Weakness.  Integumentary: Denies Rash. Denies Itching. Denies Dry skin.  Neurologic: Denies Dizziness. Denies Fainting. Denies Headache.  Psychiatric: Denies Depression. Denies Anxiety. Denies Suicidal/Homicidal ideations.    All Other ROS: Negative except as stated in HPI.     Medical / Surgical / Social / Family History       ----------------------------  Arteriosclerosis of coronary artery  Benign essential hypertension  Chronic systolic heart failure  Depressive disorder  Diabetes mellitus, type 2  Hernia of anterior abdominal wall  Hyperlipidemia  Hypertension  Neuropathy  Onychomycosis of multiple toenails with type 2 diabetes mellitus  Tinea unguium     Past Surgical History:   Procedure Laterality Date    TONSILLECTOMY         Social History     Socioeconomic History    Marital status:      Spouse name: Izaiah   Tobacco Use    Smoking status: Never    Smokeless tobacco: Never   Substance and Sexual Activity    Alcohol use: Never    Drug use: Never     Social Determinants of Health     Transportation Needs: No Transportation Needs    Lack of Transportation (Medical): No    Lack of Transportation (Non-Medical): No   Social Connections: Moderately Isolated    Frequency of Communication with Friends and Family: Once a week    Frequency of Social Gatherings with Friends and Family: Twice a week    Attends [employer] Services: Never    Active Member of Clubs or Organizations: No    Attends Club or Organization Meetings: Never    Marital Status:    Housing Stability: Low Risk     Unable to Pay for Housing in the Last Year: No    Number of  "Places Lived in the Last Year: 1    Unstable Housing in the Last Year: No        Family History   Family history unknown: Yes        Medications and Allergies     Medications  Current Outpatient Medications   Medication Instructions    aspirin (ECOTRIN) 81 mg, Oral    atorvastatin (LIPITOR) 80 mg, Oral, Daily    clopidogreL (PLAVIX) 75 mg, Oral    cyclobenzaprine (FLEXERIL) 10 MG tablet TAKE ONE TABLET BY MOUTH TWICE A DAY    empagliflozin (JARDIANCE) 10 mg, Oral, Daily    ezetimibe (ZETIA) 10 mg, Oral, Daily    fenofibrate (TRICOR) 145 mg, Oral, Daily    finasteride (PROSCAR) 5 mg, Oral, Daily    FLUoxetine 20 mg, Oral, Daily    glimepiride (AMARYL) 4 mg, Oral, 2 times daily with meals    ibuprofen (ADVIL,MOTRIN) 800 MG tablet TAKE ONE TABLET BY MOUTH TWICE A DAY AS NEEDED FOR PAIN    JANUVIA 100 mg, Oral, Daily    metFORMIN (GLUCOPHAGE) 1,000 mg, Oral, 2 times daily with meals    metoprolol succinate (TOPROL-XL) 50 mg, Oral, Daily    sacubitriL-valsartan (ENTRESTO)  mg per tablet 1 tablet, Oral, 2 times daily    UNABLE TO FIND MYCOTIC TOENAIL POLISH (PATIENT WILL PURCHASE OUT-OF-POCKET):<BR><BR>Ibuprofen 2%, Itraconazole 3%, and Vancomycin 2%.  Apply to clean and dry affected toenails twice a day (2 ml).         Allergies  Review of patient's allergies indicates:  No Known Allergies    Physical Examination     /79 (BP Location: Right arm, Patient Position: Sitting, BP Method: Large (Automatic))   Pulse 80   Temp 97.5 °F (36.4 °C) (Oral)   Resp 20   Ht 5' 5.98" (1.676 m)   Wt 118.6 kg (261 lb 6.4 oz)   BMI 42.21 kg/m²     Physical Exam  Constitutional:       Appearance: He is morbidly obese.       General: Alert and oriented, No acute distress.  Head: Normocephalic, Atraumatic.  Eye: Pupils are equal, round and reactive to light, Extraocular movements are intact, Sclera non-icteric.  Ears/Nose/Throat: Normal, Mucosa moist,Clear.  Neck/Thyroid: Supple, Non-tender, No carotid bruit, No " lymphadenopathy, No JVD, Full range of motion.  Respiratory: Clear to auscultation bilaterally; No wheezes, rales or rhonchi,Non-labored respirations, Symmetrical chest wall expansion.  Cardiovascular: Regular rate and rhythm, S1/S2 normal, No murmurs, rubs or gallops.  Gastrointestinal: Soft, Non-tender, Non-distended, Normal bowel sounds, No palpable organomegaly.  Musculoskeletal: Normal range of motion.  Integumentary: Warm, Dry, Intact, No suspicious lesions or rashes.  Extremities: No clubbing, cyanosis or edema  Neurologic: No focal deficits, Cranial Nerves II-XII are grossly intact, Motor strength normal upper and lower extremities, Sensory exam intact.  Psychiatric: Normal interaction, Coherent speech, Appropriate affect       Results     Lab Results   Component Value Date    WBC 9.18 05/15/2023    HGB 12.9 (L) 05/15/2023    HCT 37.1 (L) 05/15/2023     05/15/2023    CHOL 132 05/15/2023    TRIG 100 05/15/2023    ALT 25 11/29/2022    AST 17 11/29/2022     11/29/2022    K 4.3 11/29/2022    CREATININE 0.88 11/29/2022    BUN 14.9 11/29/2022    CO2 24 11/29/2022    TSH 0.611 05/15/2023    PSA 0.52 05/15/2023    INR 0.98 09/25/2019    HGBA1C 8.6 (H) 05/15/2023         Assessment and Plan (including Health Maintenance)     Plan:     1. Uncontrolled type 2 diabetes mellitus with hyperglycemia  A1C 8.6 not at goal. Previous A1C 7.5.   Continue metformin, januvia, and glimepiride as prescribed.  Rx jardiance.  Follow ADA diet.  Avoid soda, simple sweets, and limit rice/pasta/bread/starches and consume brown options when possible.   Maintain healthy weight with BMI goal <30.   Perform aerobic exercise for 150 minutes per week (or 5 days a week for 30 minutes each day).   Examine feet daily.   Obtain annual dilated eye exam.  Eye exam: 5/15/23  Foot exam: 9/1/22      2. Benign essential hypertension  At goal.  Continue entresto..  Follow a low sodium (less than 2 grams of sodium per day), DASH diet.    Continue medications as prescribed.  Monitor blood pressure and report any consistent values greater than 140/90 and keep a log.  Encouraged smoking cessation to aid in BP reduction and co-morbidities.   Maintain healthy weight with a BMI goal of <30.   Aerobic exercise for 150 minutes per week (or 5 days a week for 30 minutes each day).      3. Class 3 severe obesity due to excess calories with serious comorbidity and body mass index (BMI) of 40.0 to 44.9 in adult  BMI 42. Goal BMI <30.  Has lost 15 lbs since last visit.  Encouraged and congratulated on weight loss.  Aerobic exercise 150 minutes per week.  Avoid soda, simple sugars, sweets, excessive rice, pasta, potatoes or bread.   Choose brown options when available and portion control.  Limit fast foods and fried foods.   Choose complex carbs in moderation (ex: green, leafy vegetables, beans, oatmeal).  Eat plenty of fresh fruits and vegetables with lean meats daily.   Consider permanent healthy lifestyle changes.      4. Depressive disorder  Stable with meds.  Refilled fluoxetine.  Denies SI/HI.  Read positive daily meditations, avoid negative media, set healthy boundaries.  Exercise daily, keep consistent sleep pattern, eat a healthy diet.  Establish good social support, make changes to reduce stress.  Do not drink alcohol or use illicit drugs.  Reports any symptoms of suicidal/homicidal ideations or self harm immediately, go to nearest emergency room.        5. Encounter for screening for diabetic retinopathy  - Diabetic Eye Screening Photo; Future    6. Screening for malignant neoplasm of colon  FIT ordered.  - OCCULT BLOOD FECAL IMMUNOASSAY; Future  - OCCULT BLOOD FECAL IMMUNOASSAY        Problem List Items Addressed This Visit          Psychiatric    Depressive disorder       Cardiac/Vascular    Benign essential hypertension       Endocrine    Class 3 severe obesity due to excess calories with serious comorbidity and body mass index (BMI) of 40.0 to  44.9 in adult    Uncontrolled type 2 diabetes mellitus with hyperglycemia    Relevant Medications    glimepiride (AMARYL) 4 MG tablet    JANUVIA 100 mg Tab    metFORMIN (GLUCOPHAGE) 1000 MG tablet    empagliflozin (JARDIANCE) 10 mg tablet    Other Relevant Orders    Hemoglobin A1C    Basic Metabolic Panel     Other Visit Diagnoses       Encounter for screening for diabetic retinopathy    -  Primary    Relevant Orders    Diabetic Eye Screening Photo    Screening for malignant neoplasm of colon        Relevant Orders    OCCULT BLOOD FECAL IMMUNOASSAY              Health Maintenance Due   Topic Date Due    Pneumococcal Vaccines (Age 0-64) (1 - PCV) Never done    Eye Exam  Never done    Shingles Vaccine (1 of 2) Never done    Colorectal Cancer Screening  04/29/2023       Follow up in about 3 months (around 8/15/2023) for Follow up with labs one week prior..        Signature:  CECILE Rainey  [facility]  [address] [facility] [address]

## 2023-05-18 ENCOUNTER — PATIENT MESSAGE (OUTPATIENT)
Dept: ADMINISTRATIVE | Facility: HOSPITAL | Age: 53
End: 2023-05-18

## 2023-05-18 ENCOUNTER — APPOINTMENT (OUTPATIENT)
Dept: LAB | Facility: HOSPITAL | Age: 53
End: 2023-05-18
Attending: NURSE PRACTITIONER

## 2023-05-18 DIAGNOSIS — M25.551 HIP PAIN, RIGHT: Primary | ICD-10-CM

## 2023-05-18 LAB
OB IA INT NEG CNTRL (OHS): NEGATIVE
OB IA INT POS CNTRL (OHS): POSITIVE
OCCULT BLD IA (OHS): NEGATIVE

## 2023-05-21 RX ORDER — CYCLOBENZAPRINE HCL 10 MG
10 TABLET ORAL 2 TIMES DAILY PRN
Qty: 180 TABLET | Refills: 1 | Status: SHIPPED | OUTPATIENT
Start: 2023-05-21 | End: 2023-08-21 | Stop reason: SDUPTHER

## 2023-05-22 DIAGNOSIS — I50.31 ACUTE DIASTOLIC HEART FAILURE: ICD-10-CM

## 2023-05-22 RX ORDER — CLOPIDOGREL BISULFATE 75 MG/1
75 TABLET ORAL DAILY
Qty: 90 TABLET | Refills: 1 | Status: SHIPPED | OUTPATIENT
Start: 2023-05-22 | End: 2023-06-15

## 2023-05-22 RX ORDER — SACUBITRIL AND VALSARTAN 97; 103 MG/1; MG/1
1 TABLET, FILM COATED ORAL 2 TIMES DAILY
Qty: 180 TABLET | Refills: 1 | Status: SHIPPED | OUTPATIENT
Start: 2023-05-22 | End: 2023-06-15 | Stop reason: SDUPTHER

## 2023-05-22 RX ORDER — METOPROLOL SUCCINATE 50 MG/1
50 TABLET, EXTENDED RELEASE ORAL DAILY
Qty: 90 TABLET | Refills: 1 | Status: SHIPPED | OUTPATIENT
Start: 2023-05-22 | End: 2023-06-15 | Stop reason: SDUPTHER

## 2023-05-29 ENCOUNTER — TELEPHONE (OUTPATIENT)
Dept: INTERNAL MEDICINE | Facility: CLINIC | Age: 53
End: 2023-05-29

## 2023-05-29 NOTE — TELEPHONE ENCOUNTER
spoke to pt and reviewed over his  result and i confirmed with him  that his  result came back normal and the test will be repeat yearly . pt verbally agree she understood .

## 2023-06-15 ENCOUNTER — OFFICE VISIT (OUTPATIENT)
Dept: CARDIOLOGY | Facility: CLINIC | Age: 53
End: 2023-06-15

## 2023-06-15 VITALS
HEART RATE: 90 BPM | WEIGHT: 257 LBS | HEIGHT: 66 IN | RESPIRATION RATE: 20 BRPM | TEMPERATURE: 98 F | DIASTOLIC BLOOD PRESSURE: 77 MMHG | BODY MASS INDEX: 41.3 KG/M2 | OXYGEN SATURATION: 96 % | SYSTOLIC BLOOD PRESSURE: 117 MMHG

## 2023-06-15 DIAGNOSIS — E78.2 MIXED HYPERLIPIDEMIA: ICD-10-CM

## 2023-06-15 DIAGNOSIS — E11.9 DIABETES MELLITUS WITHOUT COMPLICATION: ICD-10-CM

## 2023-06-15 DIAGNOSIS — I50.42 CHRONIC COMBINED SYSTOLIC AND DIASTOLIC HEART FAILURE: ICD-10-CM

## 2023-06-15 DIAGNOSIS — R06.09 DYSPNEA ON EXERTION: Primary | ICD-10-CM

## 2023-06-15 DIAGNOSIS — I50.31 ACUTE DIASTOLIC HEART FAILURE: ICD-10-CM

## 2023-06-15 PROCEDURE — 99214 OFFICE O/P EST MOD 30 MIN: CPT | Mod: PBBFAC | Performed by: INTERNAL MEDICINE

## 2023-06-15 RX ORDER — ATORVASTATIN CALCIUM 80 MG/1
80 TABLET, FILM COATED ORAL DAILY
Qty: 90 TABLET | Refills: 3 | Status: SHIPPED | OUTPATIENT
Start: 2023-06-15 | End: 2023-08-21 | Stop reason: SDUPTHER

## 2023-06-15 RX ORDER — EZETIMIBE 10 MG/1
10 TABLET ORAL DAILY
Qty: 90 TABLET | Refills: 3 | Status: SHIPPED | OUTPATIENT
Start: 2023-06-15 | End: 2023-08-21 | Stop reason: SDUPTHER

## 2023-06-15 RX ORDER — METFORMIN HYDROCHLORIDE 1000 MG/1
1000 TABLET ORAL 2 TIMES DAILY WITH MEALS
Qty: 180 TABLET | Refills: 1 | Status: SHIPPED | OUTPATIENT
Start: 2023-06-15 | End: 2023-08-21 | Stop reason: SDUPTHER

## 2023-06-15 RX ORDER — ASPIRIN 81 MG/1
81 TABLET ORAL DAILY
Qty: 90 TABLET | Refills: 3 | Status: SHIPPED | OUTPATIENT
Start: 2023-06-15

## 2023-06-15 RX ORDER — FLUOXETINE HYDROCHLORIDE 20 MG/1
20 CAPSULE ORAL DAILY
Qty: 90 CAPSULE | Refills: 1 | Status: SHIPPED | OUTPATIENT
Start: 2023-06-15 | End: 2023-08-21 | Stop reason: SDUPTHER

## 2023-06-15 RX ORDER — FENOFIBRATE 145 MG/1
145 TABLET, FILM COATED ORAL DAILY
Qty: 180 TABLET | Refills: 3 | Status: SHIPPED | OUTPATIENT
Start: 2023-06-15 | End: 2023-08-21 | Stop reason: SDUPTHER

## 2023-06-15 RX ORDER — GLIMEPIRIDE 4 MG/1
4 TABLET ORAL 2 TIMES DAILY WITH MEALS
Qty: 180 TABLET | Refills: 1 | Status: SHIPPED | OUTPATIENT
Start: 2023-06-15 | End: 2023-08-21 | Stop reason: SDUPTHER

## 2023-06-15 RX ORDER — METOPROLOL SUCCINATE 50 MG/1
50 TABLET, EXTENDED RELEASE ORAL DAILY
Qty: 90 TABLET | Refills: 3 | Status: SHIPPED | OUTPATIENT
Start: 2023-06-15

## 2023-06-15 RX ORDER — SITAGLIPTIN 100 MG/1
100 TABLET, FILM COATED ORAL DAILY
Qty: 90 TABLET | Refills: 1 | Status: SHIPPED | OUTPATIENT
Start: 2023-06-15 | End: 2023-08-21 | Stop reason: SDUPTHER

## 2023-06-15 RX ORDER — SACUBITRIL AND VALSARTAN 97; 103 MG/1; MG/1
1 TABLET, FILM COATED ORAL 2 TIMES DAILY
Qty: 180 TABLET | Refills: 1 | Status: SHIPPED | OUTPATIENT
Start: 2023-06-15

## 2023-06-15 RX ORDER — FINASTERIDE 5 MG/1
5 TABLET, FILM COATED ORAL DAILY
Qty: 90 TABLET | Refills: 1 | Status: SHIPPED | OUTPATIENT
Start: 2023-06-15 | End: 2023-08-21

## 2023-06-15 NOTE — PROGRESS NOTES
Cardiology Attending    I have discussed Mynor Recio including the patient's symptoms, findings, and management plan with the cardiology fellow.  Please see the Cardiology Note for details.

## 2023-06-15 NOTE — PROGRESS NOTES
[facility]   Cardiology Clinic - Follow Up     Date of Visit: 6/15/2023  Reason for Visit/Chief Complaint:   Chief Complaint    f/u denies chest pain has HALL no questions             History of Present Illness:      Mynor Recio is a 53 y.o. male with a PMH significant for DM and HTN f/u. PMH diabetes, hypertension, HLD, HFrecEF (LVEF 30-35%; 2/27/2019; 55% in 12/2020), and s/p PCI with stent x2 (OM1 and mid circumflex; September 2019), ventral hernia, morbid obesity, depression, and tobacco abuse  who presents to cardiology clinic for follow up.    He reports mild dyspnea on exertion.  Pt denies chest pain, orthopnea/PND, leg swelling, palpitations, lightheadedness/dizziness, syncopal events, or other concerning symtpoms. He is compliant with his meds. No GI bleed.  He is on his feet a lot. He would like to lose weight.       Echo 12/2020  Mild concentric left ventricular hypertrophy  Left ventricular ejection fraction is measured at approximately 55 %.    Carotid Ultrasound 1/2020  The right internal carotid artery demonstrated less than 50% stenosis.  The left internal carotid artery demonstrated less than 50% stenosis.  Bilateral vertebral arteries were patent with antegrade flow.    Coronary angiogram 7/2019:  Coronary anatomy:  Left main: No significant coronary artery disease.  LAD: Luminal irregularities  RI: No significant coronary artery disease  RCA Dominant vessel, 50% proximal lesion and 50% distal lesion. Luminal irregularities throughout RCA, PDA and PLB.  LCX: 80% proximal lesion.  The LVEDP was 15 mm Hg . There is no significant transvalvular aortic gradient by pullback method.    Past Medical History:        Past Medical History:   Diagnosis Date    Arteriosclerosis of coronary artery 9/1/2022    Benign essential hypertension 9/1/2022    Chronic systolic heart failure 9/1/2022    Depressive disorder 9/1/2022    Diabetes mellitus, type 2     Hernia of anterior  abdominal wall 9/1/2022    Hyperlipidemia     Hypertension     Neuropathy 9/1/2022    Onychomycosis of multiple toenails with type 2 diabetes mellitus 9/19/2022    Tinea unguium 9/19/2022       Surgical History:        Past Surgical History:   Procedure Laterality Date    TONSILLECTOMY         Family History:        Family History   Family history unknown: Yes       Social History:        Social History     Tobacco Use    Smoking status: Never    Smokeless tobacco: Never   Substance Use Topics    Alcohol use: Never    Drug use: Never       Allergies:       Review of patient's allergies indicates:  No Known Allergies    Medications:        Current Outpatient Medications   Medication Sig Dispense Refill    aspirin (ECOTRIN) 81 MG EC tablet Take 81 mg by mouth.      atorvastatin (LIPITOR) 80 MG tablet Take 1 tablet (80 mg total) by mouth once daily. 90 tablet 1    clopidogreL (PLAVIX) 75 mg tablet Take 1 tablet (75 mg total) by mouth once daily. 90 tablet 1    cyclobenzaprine (FLEXERIL) 10 MG tablet Take 1 tablet (10 mg total) by mouth 2 (two) times daily as needed for Muscle spasms. 180 tablet 1    empagliflozin (JARDIANCE) 10 mg tablet Take 1 tablet (10 mg total) by mouth once daily. 90 tablet 1    ezetimibe (ZETIA) 10 mg tablet Take 1 tablet (10 mg total) by mouth once daily. 90 tablet 1    fenofibrate (TRICOR) 145 MG tablet Take 1 tablet (145 mg total) by mouth once daily. 180 tablet 1    finasteride (PROSCAR) 5 mg tablet Take 1 tablet (5 mg total) by mouth once daily. 90 tablet 1    FLUoxetine 20 MG capsule Take 1 capsule (20 mg total) by mouth once daily. 90 capsule 1    glimepiride (AMARYL) 4 MG tablet Take 1 tablet (4 mg total) by mouth 2 (two) times daily with meals. 180 tablet 1    ibuprofen (ADVIL,MOTRIN) 800 MG tablet TAKE ONE TABLET BY MOUTH TWICE A DAY AS NEEDED FOR PAIN 90 tablet 1    JANUVIA 100 mg Tab Take 1 tablet (100 mg total) by mouth once daily. 90 tablet 1    metFORMIN (GLUCOPHAGE) 1000 MG tablet  Take 1 tablet (1,000 mg total) by mouth 2 (two) times daily with meals. 180 tablet 1    metoprolol succinate (TOPROL-XL) 50 MG 24 hr tablet Take 1 tablet (50 mg total) by mouth once daily. 90 tablet 1    sacubitriL-valsartan (ENTRESTO)  mg per tablet Take 1 tablet by mouth 2 (two) times daily. 180 tablet 1    UNABLE TO FIND MYCOTIC TOENAIL POLISH (PATIENT WILL PURCHASE OUT-OF-POCKET):    Ibuprofen 2%, Itraconazole 3%, and Vancomycin 2%.  Apply to clean and dry affected toenails twice a day (2 ml). 1 each 5     No current facility-administered medications for this visit.       I have reviewed and updated the patient's medications, allergies, past medical history, surgical history, social history and family history as needed.    Review of Systems:        Constitutional: Denies fevers, chills, night sweats, anorexia, unexplained weight loss, malaise, fatigue  Eyes: Denies blurry vision, eye pain, floaters  ENT: Denies rhinorrhea, epistaxis, tinnitus, ear pain, sore throat, dysphagia, odynophagia   Gastrointestinal:  Denies dysphagia, nausea, vomiting, diarrhea, constipation, abdominal pain, melena, BRBPR, hematemesis, constipation, ascites  Genitourinary:  Denies dysuria, discharge, incontinence, hematuria, polyuria  Musculoskeletal:  Denies pain, stiffness, decreased ROM  Neurological: Denies any changes in sensation, weakness, seizures, headache,  parasthesias, confusion   Psych: Euthymic, denies hallucinations, SI/HI  Endocrine: Denies cold/heat intolerance, polyuria, polydypsia  Skin: Denies rash, jaundice, pruritis, wounds, lesions    Objective:        Vitals:    06/15/23 1353   BP: 117/77   Pulse: 90   Resp: 20   Temp: 98.4 °F (36.9 °C)     Wt Readings from Last 3 Encounters:   06/15/23 116.6 kg (257 lb)   05/15/23 118.6 kg (261 lb 6.4 oz)   02/19/23 124.5 kg (274 lb 7.6 oz)     Temp Readings from Last 3 Encounters:   06/15/23 98.4 °F (36.9 °C) (Oral)   05/15/23 97.5 °F (36.4 °C) (Oral)   02/19/23 98.6 °F  "(37 °C) (Oral)     BP Readings from Last 3 Encounters:   06/15/23 117/77   05/15/23 123/79   02/19/23 (!) 168/99     Pulse Readings from Last 3 Encounters:   06/15/23 90   05/15/23 80   02/19/23 88       Vitals:    06/15/23 1353   BP: 117/77   BP Location: Left arm   Patient Position: Sitting   BP Method: Medium (Automatic)   Pulse: 90   Resp: 20   Temp: 98.4 °F (36.9 °C)   TempSrc: Oral   SpO2: 96%   Weight: 116.6 kg (257 lb)   Height: 5' 6" (1.676 m)     Body mass index is 41.48 kg/m².    Physical Exam:  Gen: Alert and awake. No acute distress.   Neck: No JVD  CV: regular rhythm, no murmurs.  Lungs: clear to auscultation bilaterally.    Extremities: Good pedal pulses. No pedal edema.   Abd: soft, non tender, non distended  Neuro: AOx3,  No focal neurological deficit.      Labs:      I have reviewed the following labs below:      CBC:  Lab Results   Component Value Date    WBC 9.18 05/15/2023    HGB 12.9 (L) 05/15/2023    HCT 37.1 (L) 05/15/2023     05/15/2023    MCV 90.5 05/15/2023    RDW 12.6 05/15/2023     BMP:  Lab Results   Component Value Date     11/29/2022    K 4.3 11/29/2022    CO2 24 11/29/2022    BUN 14.9 11/29/2022    CALCIUM 9.7 11/29/2022    MG 2.1 02/28/2019    PHOS 3.7 02/28/2019     LFTs:  Lab Results   Component Value Date    ALBUMIN 3.9 11/29/2022    BILITOT 0.6 11/29/2022    AST 17 11/29/2022    ALKPHOS 60 11/29/2022    ALT 25 11/29/2022     FLP:  Cholesterol Total   Date Value Ref Range Status   05/15/2023 132 <=200 mg/dL Final     HDL Cholesterol   Date Value Ref Range Status   05/15/2023 35 35 - 60 mg/dL Final     LDL Cholesterol   Date Value Ref Range Status   05/15/2023 77.00 50.00 - 140.00 mg/dL Final     Triglyceride   Date Value Ref Range Status   05/15/2023 100 34 - 140 mg/dL Final     DM:  Lab Results   Component Value Date    HGBA1C 8.6 (H) 05/15/2023    HGBA1C 7.5 (H) 11/29/2022    HGBA1C 8.3 (H) 08/29/2022    CREATININE 0.88 11/29/2022     Thyroid:  Lab Results "   Component Value Date    TSH 0.611 05/15/2023     Anemia:  Lab Results   Component Value Date    IRON 67 05/15/2023    TIBC 361 05/15/2023    FERRITIN 15.73 (L) 05/15/2023     Coags:  Lab Results   Component Value Date    INR 0.98 09/25/2019    PROTIME 12.9 09/25/2019      Cardiac:  Lab Results   Component Value Date    TROPONINI <0.015 10/06/2019    TROPONINI 1.420 (AA) 09/26/2019    TROPONINI 0.023 02/28/2019    TROPONINI 0.027 02/27/2019       Cardiac Studies/Imaging:        I have reviewed the following studies below:      Noted above       Assessment & Plan:        Mynor Recio is a 53 y.o. male with a PMH significant for DM and HTN f/u. PMH diabetes, hypertension, HLD, HFrecEF (LVEF 30-35%; 2/27/2019; 55% in 12/2020), and s/p PCI with stent x2 (OM1 and mid circumflex; September 2019), ventral hernia, morbid obesity, depression, and tobacco abuse  who presents to cardiology clinic for follow up.    # Chronic heart failure with improved ejection fraction (previously 30-35% in 2019; improved to 55% in 12/2020)  - Continue Metoprolol, Entresto  - Previously tried Jardiance but pt states he developed significant dizziness, so does not want to take it.   - Given current dyspnea on exertion, will repeat Echo    # HTN:   Well controlled.     # CAD s/p PCI of LCX and OM1, 2019:  - Continue ASA, statin, metoprolol  - No indication for DAPT. Can stop Plavix.     # HLD: continue statin, zetia, fenofibrate    Return to clinic in 4 months.      Future Appointments   Date Time Provider Department Center   8/21/2023 12:15 PM CECILE Mcmanus [facility] ANTWON Aguila MD  [facility] Cardiology Fellow  Department of Cardiology  [facility]   06/15/2023 2:05 PM

## 2023-06-19 ENCOUNTER — PATIENT MESSAGE (OUTPATIENT)
Dept: ADMINISTRATIVE | Facility: HOSPITAL | Age: 53
End: 2023-06-19

## 2023-07-10 RX ORDER — IBUPROFEN 800 MG/1
TABLET ORAL
Qty: 90 TABLET | Refills: 1 | Status: SHIPPED | OUTPATIENT
Start: 2023-07-10 | End: 2023-10-06

## 2023-07-20 ENCOUNTER — DOCUMENTATION ONLY (OUTPATIENT)
Dept: INTERNAL MEDICINE | Facility: CLINIC | Age: 53
End: 2023-07-20

## 2023-08-10 ENCOUNTER — HOSPITAL ENCOUNTER (OUTPATIENT)
Dept: CARDIOLOGY | Facility: HOSPITAL | Age: 53
Discharge: HOME | End: 2023-08-10
Attending: INTERNAL MEDICINE

## 2023-08-10 VITALS
SYSTOLIC BLOOD PRESSURE: 126 MMHG | DIASTOLIC BLOOD PRESSURE: 72 MMHG | HEIGHT: 66 IN | BODY MASS INDEX: 41.3 KG/M2 | WEIGHT: 257 LBS

## 2023-08-10 DIAGNOSIS — R06.09 DYSPNEA ON EXERTION: ICD-10-CM

## 2023-08-10 LAB
AV INDEX (PROSTH): 0.67
AV MEAN GRADIENT: 5 MMHG
AV PEAK GRADIENT: 8 MMHG
AV VALVE AREA BY VELOCITY RATIO: 3.1 CM²
AV VALVE AREA: 3.03 CM²
AV VELOCITY RATIO: 0.68
BSA FOR ECHO PROCEDURE: 2.33 M2
CV ECHO LV RWT: 0.48 CM
DOP CALC AO PEAK VEL: 1.44 M/S
DOP CALC AO VTI: 31.4 CM
DOP CALC LVOT AREA: 4.6 CM2
DOP CALC LVOT DIAMETER: 2.41 CM
DOP CALC LVOT PEAK VEL: 0.98 M/S
DOP CALC LVOT STROKE VOLUME: 95.29 CM3
DOP CALC MV VTI: 27.7 CM
DOP CALCLVOT PEAK VEL VTI: 20.9 CM
E WAVE DECELERATION TIME: 184.32 MSEC
E/A RATIO: 0.84
E/E' RATIO: 8.71 M/S
ECHO LV POSTERIOR WALL: 1.06 CM (ref 0.6–1.1)
FRACTIONAL SHORTENING: 33 % (ref 28–44)
INTERVENTRICULAR SEPTUM: 1.42 CM (ref 0.6–1.1)
LEFT ATRIUM SIZE: 4.58 CM
LEFT INTERNAL DIMENSION IN SYSTOLE: 2.94 CM (ref 2.1–4)
LEFT VENTRICLE DIASTOLIC VOLUME INDEX: 38.85 ML/M2
LEFT VENTRICLE DIASTOLIC VOLUME: 86.64 ML
LEFT VENTRICLE MASS INDEX: 89 G/M2
LEFT VENTRICLE SYSTOLIC VOLUME INDEX: 14.9 ML/M2
LEFT VENTRICLE SYSTOLIC VOLUME: 33.18 ML
LEFT VENTRICULAR INTERNAL DIMENSION IN DIASTOLE: 4.38 CM (ref 3.5–6)
LEFT VENTRICULAR MASS: 199.28 G
LV LATERAL E/E' RATIO: 8.71 M/S
LV SEPTAL E/E' RATIO: 8.71 M/S
LVOT MG: 2.22 MMHG
LVOT MV: 0.7 CM/S
MV MEAN GRADIENT: 2 MMHG
MV PEAK A VEL: 0.73 M/S
MV PEAK E VEL: 0.61 M/S
MV PEAK GRADIENT: 3 MMHG
MV STENOSIS PRESSURE HALF TIME: 53.45 MS
MV VALVE AREA BY CONTINUITY EQUATION: 3.44 CM2
MV VALVE AREA P 1/2 METHOD: 4.12 CM2
OHS LV EJECTION FRACTION SIMPSONS BIPLANE MOD: 63 %
PISA TR MAX VEL: 1.52 M/S
RIGHT VENTRICULAR END-DIASTOLIC DIMENSION: 2.75 CM
TDI LATERAL: 0.07 M/S
TDI SEPTAL: 0.07 M/S
TDI: 0.07 M/S
TR MAX PG: 9 MMHG
Z-SCORE OF LEFT VENTRICULAR DIMENSION IN END DIASTOLE: -5.87
Z-SCORE OF LEFT VENTRICULAR DIMENSION IN END SYSTOLE: -3.84

## 2023-08-10 PROCEDURE — 93306 TTE W/DOPPLER COMPLETE: CPT

## 2023-08-15 ENCOUNTER — LAB VISIT (OUTPATIENT)
Dept: LAB | Facility: HOSPITAL | Age: 53
End: 2023-08-15
Attending: NURSE PRACTITIONER

## 2023-08-15 DIAGNOSIS — E11.65 UNCONTROLLED TYPE 2 DIABETES MELLITUS WITH HYPERGLYCEMIA: ICD-10-CM

## 2023-08-15 LAB
ANION GAP SERPL CALC-SCNC: 10 MEQ/L
BUN SERPL-MCNC: 28 MG/DL (ref 8.4–25.7)
CALCIUM SERPL-MCNC: 9.8 MG/DL (ref 8.4–10.2)
CHLORIDE SERPL-SCNC: 106 MMOL/L (ref 98–107)
CO2 SERPL-SCNC: 25 MMOL/L (ref 22–29)
CREAT SERPL-MCNC: 1.21 MG/DL (ref 0.73–1.18)
CREAT/UREA NIT SERPL: 23
EST. AVERAGE GLUCOSE BLD GHB EST-MCNC: 145.6 MG/DL
GFR SERPLBLD CREATININE-BSD FMLA CKD-EPI: >60 MLS/MIN/1.73/M2
GLUCOSE SERPL-MCNC: 166 MG/DL (ref 74–100)
HBA1C MFR BLD: 6.7 %
POTASSIUM SERPL-SCNC: 4.3 MMOL/L (ref 3.5–5.1)
SODIUM SERPL-SCNC: 141 MMOL/L (ref 136–145)

## 2023-08-15 PROCEDURE — 36415 COLL VENOUS BLD VENIPUNCTURE: CPT

## 2023-08-15 PROCEDURE — 80048 BASIC METABOLIC PNL TOTAL CA: CPT

## 2023-08-15 PROCEDURE — 83036 HEMOGLOBIN GLYCOSYLATED A1C: CPT

## 2023-08-21 ENCOUNTER — OFFICE VISIT (OUTPATIENT)
Dept: INTERNAL MEDICINE | Facility: CLINIC | Age: 53
End: 2023-08-21

## 2023-08-21 VITALS
SYSTOLIC BLOOD PRESSURE: 105 MMHG | BODY MASS INDEX: 40.76 KG/M2 | TEMPERATURE: 97 F | RESPIRATION RATE: 18 BRPM | HEART RATE: 94 BPM | WEIGHT: 253.63 LBS | DIASTOLIC BLOOD PRESSURE: 69 MMHG | HEIGHT: 66 IN

## 2023-08-21 DIAGNOSIS — E78.2 MIXED HYPERLIPIDEMIA: ICD-10-CM

## 2023-08-21 DIAGNOSIS — E11.42 TYPE 2 DIABETES MELLITUS WITH DIABETIC POLYNEUROPATHY, WITHOUT LONG-TERM CURRENT USE OF INSULIN: Primary | Chronic | ICD-10-CM

## 2023-08-21 DIAGNOSIS — E66.01 CLASS 3 SEVERE OBESITY DUE TO EXCESS CALORIES WITH SERIOUS COMORBIDITY AND BODY MASS INDEX (BMI) OF 40.0 TO 44.9 IN ADULT: Chronic | ICD-10-CM

## 2023-08-21 DIAGNOSIS — F32.A DEPRESSIVE DISORDER: Chronic | ICD-10-CM

## 2023-08-21 DIAGNOSIS — I50.42 CHRONIC COMBINED SYSTOLIC AND DIASTOLIC HEART FAILURE: ICD-10-CM

## 2023-08-21 DIAGNOSIS — E11.9 DIABETES MELLITUS WITHOUT COMPLICATION: ICD-10-CM

## 2023-08-21 DIAGNOSIS — M25.551 HIP PAIN, RIGHT: ICD-10-CM

## 2023-08-21 PROBLEM — E66.813 CLASS 3 SEVERE OBESITY DUE TO EXCESS CALORIES WITH SERIOUS COMORBIDITY AND BODY MASS INDEX (BMI) OF 40.0 TO 44.9 IN ADULT: Chronic | Status: ACTIVE | Noted: 2022-09-01

## 2023-08-21 PROBLEM — I10 BENIGN ESSENTIAL HYPERTENSION: Chronic | Status: ACTIVE | Noted: 2022-09-01

## 2023-08-21 PROBLEM — I50.22 CHRONIC SYSTOLIC HEART FAILURE: Chronic | Status: ACTIVE | Noted: 2022-09-01

## 2023-08-21 PROBLEM — E78.5 HYPERLIPIDEMIA: Chronic | Status: ACTIVE | Noted: 2022-09-01

## 2023-08-21 PROBLEM — I25.10 ARTERIOSCLEROSIS OF CORONARY ARTERY: Chronic | Status: ACTIVE | Noted: 2022-09-01

## 2023-08-21 PROBLEM — G62.9 NEUROPATHY: Chronic | Status: ACTIVE | Noted: 2022-09-01

## 2023-08-21 PROCEDURE — 99214 PR OFFICE/OUTPT VISIT, EST, LEVL IV, 30-39 MIN: ICD-10-PCS | Mod: 25,S$PBB,, | Performed by: NURSE PRACTITIONER

## 2023-08-21 PROCEDURE — 99214 OFFICE O/P EST MOD 30 MIN: CPT | Mod: 25,S$PBB,, | Performed by: NURSE PRACTITIONER

## 2023-08-21 PROCEDURE — 99214 OFFICE O/P EST MOD 30 MIN: CPT | Mod: PBBFAC | Performed by: NURSE PRACTITIONER

## 2023-08-21 RX ORDER — SITAGLIPTIN 100 MG/1
100 TABLET, FILM COATED ORAL DAILY
Qty: 90 TABLET | Refills: 1 | Status: SHIPPED | OUTPATIENT
Start: 2023-08-21 | End: 2024-02-22 | Stop reason: SDUPTHER

## 2023-08-21 RX ORDER — FLUOXETINE HYDROCHLORIDE 20 MG/1
20 CAPSULE ORAL DAILY
Qty: 90 CAPSULE | Refills: 1 | Status: SHIPPED | OUTPATIENT
Start: 2023-08-21 | End: 2024-02-22 | Stop reason: SDUPTHER

## 2023-08-21 RX ORDER — EZETIMIBE 10 MG/1
10 TABLET ORAL DAILY
Qty: 90 TABLET | Refills: 3 | Status: SHIPPED | OUTPATIENT
Start: 2023-08-21 | End: 2024-08-15

## 2023-08-21 RX ORDER — CYCLOBENZAPRINE HCL 10 MG
10 TABLET ORAL 2 TIMES DAILY PRN
Qty: 60 TABLET | Refills: 1 | Status: SHIPPED | OUTPATIENT
Start: 2023-08-21 | End: 2024-02-22 | Stop reason: SDUPTHER

## 2023-08-21 RX ORDER — METFORMIN HYDROCHLORIDE 1000 MG/1
1000 TABLET ORAL 2 TIMES DAILY WITH MEALS
Qty: 180 TABLET | Refills: 3 | Status: SHIPPED | OUTPATIENT
Start: 2023-08-21

## 2023-08-21 RX ORDER — ATORVASTATIN CALCIUM 80 MG/1
80 TABLET, FILM COATED ORAL DAILY
Qty: 90 TABLET | Refills: 3 | Status: SHIPPED | OUTPATIENT
Start: 2023-08-21

## 2023-08-21 RX ORDER — FINASTERIDE 5 MG/1
5 TABLET, FILM COATED ORAL DAILY
Qty: 90 TABLET | Refills: 3 | Status: SHIPPED | OUTPATIENT
Start: 2023-08-21

## 2023-08-21 RX ORDER — FENOFIBRATE 145 MG/1
145 TABLET, FILM COATED ORAL DAILY
Qty: 180 TABLET | Refills: 3 | Status: SHIPPED | OUTPATIENT
Start: 2023-08-21

## 2023-08-21 RX ORDER — GLIMEPIRIDE 4 MG/1
4 TABLET ORAL 2 TIMES DAILY WITH MEALS
Qty: 180 TABLET | Refills: 1 | Status: SHIPPED | OUTPATIENT
Start: 2023-08-21 | End: 2024-02-22 | Stop reason: SDUPTHER

## 2023-08-21 NOTE — PROGRESS NOTES
Sadia Morillo, CECILE   [facility]  [address] [address]      PATIENT NAME: Mynor Recio  : 1970  DATE: 23  MRN: [id number]      Reason for Visit / Chief Complaint: Diabetes (Lab results, needs refills, refused vaccines)       History of Present Illness / Problem Focused Workflow     Mynor Recio is a 53 y.o. White male presents to the clinic for DM f/u. PMH diabetes, hypertension, HLD, HFrEF (LVEF 30-35%; 2019), and s/p PCI with stent x2 (OM1 and mid circumflex; 2019), ventral hernia, morbid obesity, depression, and tobacco abuse. Followed by [facility] cardio clinic.     Pt reports today for DM f/u. Labs reviewed with pt. Continues not to check CBGs routinely. Plavix was d/c'd at last cardio visit. Reports med compliance. Attempts ADA/cardiac diet. Mood remains stable. Declines vaccines today d/t cost. States will soon obtain insurance through job. Once obtained would like to resume ozempic if covered. Ultimately desires hernia repair; was told had to lose weight. Denies chest pain, shortness of breath, cough, fever, headache, dizziness, weakness, abdominal pain, nausea, vomiting, diarrhea, constipation, dysuria, anxiety, SI/HI.    Review of Systems     Review of Systems     See HPI for details    Constitutional: Denies Change in appetite. Denies Chills. Denies Fever. Denies Night sweats.  Eye: Denies Blurred vision. Denies Discharge. Denies Eye pain.  ENT: Denies Decreased hearing. Denies Sore throat. Denies Swollen glands.  Respiratory: Denies Cough. Denies Shortness of breath. Denies Shortness of breath with exertion. Denies Wheezing.  Cardiovascular: Denies Chest pain at rest. Denies Chest pain with exertion. Denies Irregular heartbeat. Denies Palpitations. Denies Edema.  Gastrointestinal: Denies Abdominal pain. Denies Diarrhea. Denies Nausea. Denies Vomiting. Denies Hematemesis or  Hematochezia.  Genitourinary: Denies Dysuria. Denies Urinary frequency. Denies Urinary urgency. Denies Blood in urine.  Endocrine: Denies Cold intolerance. Denies Excessive thirst. Denies Heat intolerance. Denies Weight loss. Denies Weight gain.  Musculoskeletal: Denies Painful joints. Denies Weakness.  Integumentary: Denies Rash. Denies Itching. Denies Dry skin.  Neurologic: Denies Dizziness. Denies Fainting. Denies Headache.  Psychiatric: Denies Depression. Denies Anxiety. Denies Suicidal/Homicidal ideations.    All Other ROS: Negative except as stated in HPI.     Medical / Surgical / Social / Family History       ----------------------------  Arteriosclerosis of coronary artery  Benign essential hypertension  Chronic systolic heart failure  Depressive disorder  Diabetes mellitus, type 2  Hernia of anterior abdominal wall  Hyperlipidemia  Hypertension  Neuropathy  Onychomycosis of multiple toenails with type 2 diabetes mellitus  Tinea unguium     Past Surgical History:   Procedure Laterality Date    TONSILLECTOMY         Social History     Socioeconomic History    Marital status:      Spouse name: Izaiah   Tobacco Use    Smoking status: Never     Passive exposure: Never    Smokeless tobacco: Never   Substance and Sexual Activity    Alcohol use: Never    Drug use: Never     Social Determinants of Health     Transportation Needs: No Transportation Needs (9/1/2022)    PRAPARE - Transportation     Lack of Transportation (Medical): No     Lack of Transportation (Non-Medical): No   Social Connections: Moderately Isolated (12/22/2022)    Social Connection and Isolation Panel [NHANES]     Frequency of Communication with Friends and Family: Once a week     Frequency of Social Gatherings with Friends and Family: Twice a week     Attends Yarsani Services: Never     Active Member of Clubs or Organizations: No     Attends Club or Organization Meetings: Never     Marital Status:    Housing Stability: Low Risk   "(9/1/2022)    Housing Stability Vital Sign     Unable to Pay for Housing in the Last Year: No     Number of Places Lived in the Last Year: 1     Unstable Housing in the Last Year: No        Family History   Family history unknown: Yes        Medications and Allergies     Medications  Current Outpatient Medications   Medication Instructions    aspirin (ECOTRIN) 81 mg, Oral, Daily    atorvastatin (LIPITOR) 80 mg, Oral, Daily    cyclobenzaprine (FLEXERIL) 10 mg, Oral, 2 times daily PRN    ezetimibe (ZETIA) 10 mg, Oral, Daily    fenofibrate (TRICOR) 145 mg, Oral, Daily    finasteride (PROSCAR) 5 mg, Oral, Daily    FLUoxetine 20 mg, Oral, Daily    glimepiride (AMARYL) 4 mg, Oral, 2 times daily with meals    ibuprofen (ADVIL,MOTRIN) 800 MG tablet TAKE ONE TABLET BY MOUTH TWICE A DAY AS NEEDED FOR PAIN    JANUVIA 100 mg, Oral, Daily    metFORMIN (GLUCOPHAGE) 1,000 mg, Oral, 2 times daily with meals    metoprolol succinate (TOPROL-XL) 50 mg, Oral, Daily    sacubitriL-valsartan (ENTRESTO)  mg per tablet 1 tablet, Oral, 2 times daily         Allergies  Review of patient's allergies indicates:  No Known Allergies    Physical Examination     /69 (BP Location: Right arm, Patient Position: Sitting, BP Method: Large (Automatic))   Pulse 94   Temp 97.4 °F (36.3 °C) (Oral)   Resp 18   Ht 5' 5.98" (1.676 m)   Wt 115 kg (253 lb 9.6 oz)   BMI 40.95 kg/m²     Physical Exam  Constitutional:       Appearance: He is morbidly obese.   Abdominal:      Hernia: A hernia is present. Hernia is present in the umbilical area.       Musculoskeletal:        Feet:    Feet:      Left foot:      Skin integrity: Callus present.      Comments: Hard, yellow callus noted        General: Alert and oriented, No acute distress.  Head: Normocephalic, Atraumatic.  Eye: Pupils are equal, round and reactive to light, Extraocular movements are intact, Sclera non-icteric.  Ears/Nose/Throat: Normal, Mucosa moist,Clear.  Neck/Thyroid: Supple, " Non-tender, No carotid bruit, No lymphadenopathy, No JVD, Full range of motion.  Respiratory: Clear to auscultation bilaterally; No wheezes, rales or rhonchi,Non-labored respirations, Symmetrical chest wall expansion.  Cardiovascular: Regular rate and rhythm, S1/S2 normal, No murmurs, rubs or gallops.  Gastrointestinal: Soft, Non-tender, Non-distended, Normal bowel sounds, No palpable organomegaly.  Musculoskeletal: Normal range of motion.  Integumentary: Warm, Dry, Intact, No suspicious lesions or rashes.  Extremities: No clubbing, cyanosis or edema  Neurologic: No focal deficits, Cranial Nerves II-XII are grossly intact, Motor strength normal upper and lower extremities, Sensory exam intact.  Psychiatric: Normal interaction, Coherent speech, Appropriate affect     Protective Sensation (w/ 10 gram monofilament):  Right: Intact  Left: Intact    Visual Inspection:  Normal -  Bilateral and Callus -  Left    Pedal Pulses:   Right: Present  Left: Present    Posterior Tibialis Pulses:   Right:Present  Left: Present      Results     Lab Results   Component Value Date    WBC 9.18 05/15/2023    HGB 12.9 (L) 05/15/2023    HCT 37.1 (L) 05/15/2023     05/15/2023    CHOL 132 05/15/2023    TRIG 100 05/15/2023    ALT 25 11/29/2022    AST 17 11/29/2022     08/15/2023    K 4.3 08/15/2023    CREATININE 1.21 (H) 08/15/2023    BUN 28.0 (H) 08/15/2023    CO2 25 08/15/2023    TSH 0.611 05/15/2023    PSA 0.52 05/15/2023    INR 0.98 09/25/2019    HGBA1C 6.7 08/15/2023         Assessment and Plan (including Health Maintenance)     Plan:     1. Type 2 diabetes mellitus with diabetic polyneuropathy, without long-term current use of insulin  A1C 6.7 at goal. Previous A1C 8.6.   Encouraged and congratulated on achieving adequate glucose control.  Continue metformin, januvia, and glimepiride as prescribed.  Follow ADA diet.  Avoid soda, simple sweets, and limit rice/pasta/bread/starches and consume brown options when possible.    Maintain healthy weight with BMI goal <30.   Perform aerobic exercise for 150 minutes per week (or 5 days a week for 30 minutes each day).   Examine feet daily.   Obtain annual dilated eye exam.  Eye exam: 5/15/23  Foot exam: 8/21/23    2. Depressive disorder  Stable; continue fluoxetine.  Denies SI/HI.  Read positive daily meditations, avoid negative media, set healthy boundaries.  Exercise daily, keep consistent sleep pattern, eat a healthy diet.  Establish good social support, make changes to reduce stress.  Do not drink alcohol or use illicit drugs.  Reports any symptoms of suicidal/homicidal ideations or self harm immediately, go to nearest emergency room.      3. Class 3 severe obesity due to excess calories with serious comorbidity and body mass index (BMI) of 40.0 to 44.9 in adult  BMI 40. Goal BMI <30.  Has lost additional 5 lbs since last visit for a total of 20 lbs.  Encouraged and congratulated on continued weight loss.  Aerobic exercise 150 minutes per week.  Avoid soda, simple sugars, sweets, excessive rice, pasta, potatoes or bread.   Choose brown options when available and portion control.  Limit fast foods and fried foods.   Choose complex carbs in moderation (ex: green, leafy vegetables, beans, oatmeal).  Eat plenty of fresh fruits and vegetables with lean meats daily.   Consider permanent healthy lifestyle changes.      4. Chronic combined systolic and diastolic heart failure  Keep cardio appts/diagnostics as scheduled.  Continue entresto and metoprolol.  Notify the clinic if you gain 3 or more pounds in one day or 5 or more pounds in one week.  Stressed importance of daily morning weights after urination but prior to breakfast on the same scale.  Low Sodium Diet (Dash Diet - less than 2 grams of sodium per day).  Follow a low cholesterol, low saturated fat diet with less that 200mg of cholesterol a day.  Cut down on alcohol if you have more than 1 drink a day (for women) or 2 drinks a day (for  men).  Maintain healthy weight with goal BMI <30. Perform 30 minutes of daily physical activity 5 days per week.  Report to ER for chest pain, SOB, difficulty breathing, abdominal distention or significant edema to lower extremities.      Problem List Items Addressed This Visit          Psychiatric    Depressive disorder (Chronic)       Cardiac/Vascular    Hyperlipidemia (Chronic)    Relevant Medications    ezetimibe (ZETIA) 10 mg tablet    fenofibrate (TRICOR) 145 MG tablet       Endocrine    Diabetes mellitus - Primary (Chronic)    Relevant Medications    glimepiride (AMARYL) 4 MG tablet    JANUVIA 100 mg Tab    metFORMIN (GLUCOPHAGE) 1000 MG tablet    Class 3 severe obesity due to excess calories with serious comorbidity and body mass index (BMI) of 40.0 to 44.9 in adult (Chronic)     Other Visit Diagnoses       Chronic combined systolic and diastolic heart failure        Relevant Medications    atorvastatin (LIPITOR) 80 MG tablet    FLUoxetine 20 MG capsule    Hip pain, right        Relevant Medications    cyclobenzaprine (FLEXERIL) 10 MG tablet    Diabetes mellitus without complication        Relevant Medications    glimepiride (AMARYL) 4 MG tablet    JANUVIA 100 mg Tab    metFORMIN (GLUCOPHAGE) 1000 MG tablet    finasteride (PROSCAR) 5 mg tablet              Health Maintenance Due   Topic Date Due    Pneumococcal Vaccines (Age 0-64) (1 - PCV) Never done    Shingles Vaccine (1 of 2) Never done       Follow up in about 6 months (around 2/21/2024) for Follow up, Lab Results.        Signature:  CECILE Rainey  [facility]  [address] [address]

## 2023-08-25 NOTE — PROGRESS NOTES
Mynor Recio is a 53 y.o. male here for a diabetic eye screening with non-dilated fundus photos per CECILE Mcmanus.    Patient cooperative?: Yes  Small pupils?: No  Last eye exam: unknown    For exam results, see Encounter Report.

## 2023-10-06 RX ORDER — IBUPROFEN 800 MG/1
TABLET ORAL
Qty: 90 TABLET | Refills: 1 | Status: SHIPPED | OUTPATIENT
Start: 2023-10-06 | End: 2024-01-24 | Stop reason: SDUPTHER

## 2024-01-16 DIAGNOSIS — M25.551 HIP PAIN, RIGHT: Primary | ICD-10-CM

## 2024-01-16 DIAGNOSIS — G62.9 NEUROPATHY: Chronic | ICD-10-CM

## 2024-01-16 NOTE — TELEPHONE ENCOUNTER
----- Message from Jody Del Rosario sent at 1/16/2024  2:28 PM CST -----  Regarding: Refill Request  Patient is requesting refills on Ibuprofen 800 mg tablets. He is currently out.  [facility].

## 2024-01-23 RX ORDER — IBUPROFEN 800 MG/1
800 TABLET ORAL 2 TIMES DAILY PRN
Qty: 90 TABLET | Refills: 1 | OUTPATIENT
Start: 2024-01-23 | End: 2024-04-22

## 2024-01-24 RX ORDER — IBUPROFEN 800 MG/1
800 TABLET ORAL EVERY 8 HOURS PRN
Qty: 90 TABLET | Refills: 1 | Status: SHIPPED | OUTPATIENT
Start: 2024-01-24 | End: 2024-03-25

## 2024-02-16 ENCOUNTER — TELEPHONE (OUTPATIENT)
Dept: CARDIOLOGY | Facility: CLINIC | Age: 54
End: 2024-02-16
Payer: COMMERCIAL

## 2024-02-22 ENCOUNTER — OFFICE VISIT (OUTPATIENT)
Dept: INTERNAL MEDICINE | Facility: CLINIC | Age: 54
End: 2024-02-22
Payer: COMMERCIAL

## 2024-02-22 VITALS
OXYGEN SATURATION: 96 % | HEART RATE: 82 BPM | TEMPERATURE: 98 F | DIASTOLIC BLOOD PRESSURE: 73 MMHG | RESPIRATION RATE: 20 BRPM | SYSTOLIC BLOOD PRESSURE: 132 MMHG

## 2024-02-22 DIAGNOSIS — E11.42 TYPE 2 DIABETES MELLITUS WITH DIABETIC POLYNEUROPATHY, WITHOUT LONG-TERM CURRENT USE OF INSULIN: Primary | Chronic | ICD-10-CM

## 2024-02-22 DIAGNOSIS — E66.01 CLASS 3 SEVERE OBESITY DUE TO EXCESS CALORIES WITH SERIOUS COMORBIDITY AND BODY MASS INDEX (BMI) OF 40.0 TO 44.9 IN ADULT: Chronic | ICD-10-CM

## 2024-02-22 DIAGNOSIS — M51.36 DDD (DEGENERATIVE DISC DISEASE), LUMBAR: Chronic | ICD-10-CM

## 2024-02-22 DIAGNOSIS — F32.A DEPRESSIVE DISORDER: Chronic | ICD-10-CM

## 2024-02-22 DIAGNOSIS — E11.9 DIABETES MELLITUS WITHOUT COMPLICATION: ICD-10-CM

## 2024-02-22 DIAGNOSIS — I50.42 CHRONIC COMBINED SYSTOLIC AND DIASTOLIC HEART FAILURE: ICD-10-CM

## 2024-02-22 DIAGNOSIS — M25.551 HIP PAIN, RIGHT: ICD-10-CM

## 2024-02-22 PROBLEM — M51.369 DDD (DEGENERATIVE DISC DISEASE), LUMBAR: Chronic | Status: ACTIVE | Noted: 2024-02-22

## 2024-02-22 LAB — HBA1C MFR BLD: 7.5 %

## 2024-02-22 PROCEDURE — 99214 OFFICE O/P EST MOD 30 MIN: CPT | Mod: PBBFAC | Performed by: NURSE PRACTITIONER

## 2024-02-22 PROCEDURE — 3075F SYST BP GE 130 - 139MM HG: CPT | Mod: CPTII,,, | Performed by: NURSE PRACTITIONER

## 2024-02-22 PROCEDURE — 3078F DIAST BP <80 MM HG: CPT | Mod: CPTII,,, | Performed by: NURSE PRACTITIONER

## 2024-02-22 PROCEDURE — 1160F RVW MEDS BY RX/DR IN RCRD: CPT | Mod: CPTII,,, | Performed by: NURSE PRACTITIONER

## 2024-02-22 PROCEDURE — 3051F HG A1C>EQUAL 7.0%<8.0%: CPT | Mod: CPTII,,, | Performed by: NURSE PRACTITIONER

## 2024-02-22 PROCEDURE — 1159F MED LIST DOCD IN RCRD: CPT | Mod: CPTII,,, | Performed by: NURSE PRACTITIONER

## 2024-02-22 PROCEDURE — 99214 OFFICE O/P EST MOD 30 MIN: CPT | Mod: S$PBB,,, | Performed by: NURSE PRACTITIONER

## 2024-02-22 PROCEDURE — 83036 HEMOGLOBIN GLYCOSYLATED A1C: CPT | Mod: PBBFAC | Performed by: NURSE PRACTITIONER

## 2024-02-22 RX ORDER — CYCLOBENZAPRINE HCL 10 MG
10 TABLET ORAL 2 TIMES DAILY PRN
Qty: 60 TABLET | Refills: 1 | Status: SHIPPED | OUTPATIENT
Start: 2024-02-22

## 2024-02-22 RX ORDER — GLIMEPIRIDE 4 MG/1
4 TABLET ORAL 2 TIMES DAILY WITH MEALS
Qty: 180 TABLET | Refills: 1 | Status: SHIPPED | OUTPATIENT
Start: 2024-02-22 | End: 2024-08-20

## 2024-02-22 RX ORDER — FLUOXETINE HYDROCHLORIDE 20 MG/1
20 CAPSULE ORAL DAILY
Qty: 90 CAPSULE | Refills: 1 | Status: SHIPPED | OUTPATIENT
Start: 2024-02-22 | End: 2024-08-20

## 2024-02-22 RX ORDER — SITAGLIPTIN 100 MG/1
100 TABLET, FILM COATED ORAL DAILY
Qty: 90 TABLET | Refills: 1 | Status: SHIPPED | OUTPATIENT
Start: 2024-02-22

## 2024-03-25 RX ORDER — IBUPROFEN 800 MG/1
800 TABLET ORAL EVERY 8 HOURS PRN
Qty: 90 TABLET | Refills: 1 | Status: SHIPPED | OUTPATIENT
Start: 2024-03-25

## 2024-04-16 ENCOUNTER — OFFICE VISIT (OUTPATIENT)
Dept: CARDIOLOGY | Facility: CLINIC | Age: 54
End: 2024-04-16
Payer: COMMERCIAL

## 2024-04-16 VITALS
BODY MASS INDEX: 40.29 KG/M2 | SYSTOLIC BLOOD PRESSURE: 101 MMHG | TEMPERATURE: 97 F | RESPIRATION RATE: 20 BRPM | HEART RATE: 70 BPM | WEIGHT: 241.81 LBS | DIASTOLIC BLOOD PRESSURE: 67 MMHG | HEIGHT: 65 IN

## 2024-04-16 DIAGNOSIS — I10 BENIGN ESSENTIAL HYPERTENSION: Chronic | ICD-10-CM

## 2024-04-16 DIAGNOSIS — I25.10 ARTERIOSCLEROSIS OF CORONARY ARTERY: Primary | Chronic | ICD-10-CM

## 2024-04-16 DIAGNOSIS — I50.22 CHRONIC SYSTOLIC HEART FAILURE: Chronic | ICD-10-CM

## 2024-04-16 DIAGNOSIS — E78.2 MIXED HYPERLIPIDEMIA: ICD-10-CM

## 2024-04-16 DIAGNOSIS — I50.42 CHRONIC COMBINED SYSTOLIC AND DIASTOLIC HEART FAILURE: ICD-10-CM

## 2024-04-16 PROCEDURE — 99213 OFFICE O/P EST LOW 20 MIN: CPT | Mod: PBBFAC | Performed by: INTERNAL MEDICINE

## 2024-04-16 PROCEDURE — 93005 ELECTROCARDIOGRAM TRACING: CPT

## 2024-04-16 RX ORDER — SACUBITRIL AND VALSARTAN 97; 103 MG/1; MG/1
1 TABLET, FILM COATED ORAL 2 TIMES DAILY
Qty: 180 TABLET | Refills: 1 | Status: SHIPPED | OUTPATIENT
Start: 2024-04-16

## 2024-04-16 RX ORDER — EZETIMIBE 10 MG/1
10 TABLET ORAL DAILY
Qty: 90 TABLET | Refills: 3 | Status: SHIPPED | OUTPATIENT
Start: 2024-04-16 | End: 2025-04-11

## 2024-04-16 RX ORDER — ASPIRIN 81 MG/1
81 TABLET ORAL DAILY
Qty: 90 TABLET | Refills: 3 | Status: SHIPPED | OUTPATIENT
Start: 2024-04-16

## 2024-04-16 RX ORDER — METOPROLOL SUCCINATE 50 MG/1
50 TABLET, EXTENDED RELEASE ORAL DAILY
Qty: 90 TABLET | Refills: 3 | Status: SHIPPED | OUTPATIENT
Start: 2024-04-16

## 2024-04-16 RX ORDER — ATORVASTATIN CALCIUM 80 MG/1
80 TABLET, FILM COATED ORAL DAILY
Qty: 90 TABLET | Refills: 3 | Status: SHIPPED | OUTPATIENT
Start: 2024-04-16

## 2024-04-16 RX ORDER — FENOFIBRATE 145 MG/1
145 TABLET, FILM COATED ORAL DAILY
Qty: 180 TABLET | Refills: 3 | Status: SHIPPED | OUTPATIENT
Start: 2024-04-16

## 2024-04-16 NOTE — PROGRESS NOTES
04/16/2024 5:03 PM    Subjective:     CHIEF COMPLAINT:   Chief Complaint   Patient presents with    f/u denies xchest pain or sob since LV no questions                            HPI:    Mr. Mynor Recio is a 54 y.o. male.      Today the patient comes for a follow-up appointment.  The patient has history of CAD and HTN    CP:  The patient has no chest discomfort.      SOB:  The patient denies shortness of breath No HALL    ORTHOPNEA:  The patient denies orthopnea.  No PND.      EDEMA:  The patient denies edema.        FATIGUE:  The patient feels fatigued on occasion.     SYNCOPE:  The patient denies near syncope.  No syncope.   Denies getting lightheaded.    PALPITATIONS:  The patient has no palpitations.    LEVEL OF EXERTION:  The patient works.  The patient does not have symptoms with this level of exertion.  The patient's level of exertion is adequate.     METS:  The patient does the following activities:    Works as cook    Past Medical History    Patient Active Problem List   Diagnosis    Arteriosclerosis of coronary artery    Benign essential hypertension    Chronic systolic heart failure    Depressive disorder    Diabetes mellitus    Hernia of anterior abdominal wall    Mixed hyperlipidemia    Class 3 severe obesity due to excess calories with serious comorbidity and body mass index (BMI) of 40.0 to 44.9 in adult    Neuropathy    Uncontrolled type 2 diabetes mellitus with hyperglycemia    Onychomycosis of multiple toenails with type 2 diabetes mellitus    Tinea unguium    DDD (degenerative disc disease), lumbar       Surgical History    Past Surgical History:   Procedure Laterality Date    TONSILLECTOMY         Social History     Socioeconomic History    Marital status:      Spouse name: Izaiah   Tobacco Use    Smoking status: Never     Passive exposure: Never    Smokeless tobacco: Never   Substance and Sexual Activity    Alcohol use: Never    Drug use: Never    Sexual activity: Yes     Partners:  Male     Social Determinants of Health     Financial Resource Strain: Medium Risk (4/4/2024)    Overall Financial Resource Strain (CARDIA)     Difficulty of Paying Living Expenses: Somewhat hard   Food Insecurity: No Food Insecurity (4/4/2024)    Hunger Vital Sign     Worried About Running Out of Food in the Last Year: Never true     Ran Out of Food in the Last Year: Never true   Transportation Needs: No Transportation Needs (4/4/2024)    PRAPARE - Transportation     Lack of Transportation (Medical): No     Lack of Transportation (Non-Medical): No   Physical Activity: Inactive (4/4/2024)    Exercise Vital Sign     Days of Exercise per Week: 0 days     Minutes of Exercise per Session: 0 min   Stress: No Stress Concern Present (4/4/2024)    [employer] - Occupational Stress Questionnaire     Feeling of Stress : Not at all   Social Connections: Unknown (4/4/2024)    Social Connection and Isolation Panel [NHANES]     Frequency of Communication with Friends and Family: Twice a week     Frequency of Social Gatherings with Friends and Family: Twice a week     Active Member of Clubs or Organizations: No     Attends Club or Organization Meetings: Never     Marital Status:    Housing Stability: Low Risk  (4/4/2024)    Housing Stability Vital Sign     Unable to Pay for Housing in the Last Year: No     Number of Places Lived in the Last Year: 1     Unstable Housing in the Last Year: No       Family History   Family history unknown: Yes     Review of patient's allergies indicates:  No Known Allergies    Current Medications    Current Outpatient Medications   Medication Instructions    aspirin (ECOTRIN) 81 mg, Oral, Daily    atorvastatin (LIPITOR) 80 mg, Oral, Daily    cyclobenzaprine (FLEXERIL) 10 mg, Oral, 2 times daily PRN    ezetimibe (ZETIA) 10 mg, Oral, Daily    fenofibrate (TRICOR) 145 mg, Oral, Daily    finasteride (PROSCAR) 5 mg, Oral, Daily    FLUoxetine 20 mg, Oral, Daily     "glimepiride (AMARYL) 4 mg, Oral, 2 times daily with meals    ibuprofen (ADVIL,MOTRIN) 800 mg, Oral, Every 8 hours PRN    JANUVIA 100 mg, Oral, Daily    metFORMIN (GLUCOPHAGE) 1,000 mg, Oral, 2 times daily with meals    metoprolol succinate (TOPROL-XL) 50 mg, Oral, Daily    sacubitriL-valsartan (ENTRESTO)  mg per tablet 1 tablet, Oral, 2 times daily    semaglutide (OZEMPIC) 0.5 mg, Subcutaneous, Every 7 days         ROS:   refer to HPI     Objective:     BP Readings from Last 3 Encounters:   04/16/24 101/67   02/22/24 132/73   08/21/23 105/69        Pulse Readings from Last 3 Encounters:   04/16/24 70   02/22/24 82   08/21/23 94        Temp Readings from Last 3 Encounters:   04/16/24 97.3 °F (36.3 °C) (Oral)   02/22/24 98.3 °F (36.8 °C) (Oral)   08/21/23 97.4 °F (36.3 °C) (Oral)       Wt Readings from Last 3 Encounters:   04/16/24 109.7 kg (241 lb 12.8 oz)   02/22/24 (P) 115.6 kg (254 lb 12.8 oz)   08/21/23 115 kg (253 lb 9.6 oz)         PE:  Blood pressure 101/67, pulse 70, temperature 97.3 °F (36.3 °C), temperature source Oral, resp. rate 20, height 5' 5" (1.651 m), weight 109.7 kg (241 lb 12.8 oz).   GENERAL:  Ambulates  CONSTITUTIONAL:  No acute distress.  Not ill appearing.  NECK:  No cervical adenopathy.  No carotid bruit.  CARDIOVASCULAR:  Normal rate.  Regular rhythm.  No murmur.  PULMONARY:  No respiratory distress.  No wheezing.  No crackles.  ABDOMINAL:  No distention.  No tenderness.   Obese  MUSCULOSKELETAL:  No deformity.  Trace edema  SKIN:  No bruising.  No rash.  NEUROLOGICAL:  Oriented x 3.  No weakness.                                                                                                                                                                                                                                                                                                                                                                                                              " "                                                                 CARDIAC TESTING:  Echocardiogram  Results for orders placed during the hospital encounter of 08/10/23    Echo    Interpretation Summary    Left Ventricle: There is normal systolic function. Biplane (2D) method of discs ejection fraction is 63%.    Left Atrium: Left atrium is mildly dilated.    Right Ventricle: Normal right ventricular cavity size. Systolic function is normal.    Aortic Valve: The aortic valve is a trileaflet valve.      Stress Test  No results found for this or any previous visit.     Coronary Angiogram  Results for orders placed in visit on 07/03/19    CATH LAB PROCEDURE     Holter Monitor  No cardiac monitor results found for the past 12 months    Last BMP BMP  Lab Results   Component Value Date     08/15/2023    K 4.3 08/15/2023    CO2 25 08/15/2023    BUN 28.0 (H) 08/15/2023    CREATININE 1.21 (H) 08/15/2023    CALCIUM 9.8 08/15/2023    EGFRNORACEVR >60 08/15/2023      Creatinine    Lab Results   Component Value Date    CREATININE 1.21 (H) 08/15/2023    CREATININE 0.88 11/29/2022    CREATININE 1.27 (H) 08/29/2022     Magnesium No components found for: "MAG"  Last CBC     Lab Results   Component Value Date    WBC 9.18 05/15/2023    HGB 12.9 (L) 05/15/2023    HCT 37.1 (L) 05/15/2023    MCV 90.5 05/15/2023     05/15/2023           BNP  No results found for: "BNP"  Last lipids    Lab Results   Component Value Date    CHOL 132 05/15/2023    CHOL 134 04/12/2022    CHOL 137 03/03/2022    HDL 35 05/15/2023    HDL 35 04/12/2022    HDL 39 03/03/2022    LDL 77.00 05/15/2023    LDL 59.00 04/12/2022    LDL 68.00 03/03/2022    TRIG 100 05/15/2023    TRIG 200 04/12/2022    TRIG 150 03/03/2022    TOTALCHOLEST 4 05/15/2023    TOTALCHOLEST 4 04/12/2022    TOTALCHOLEST 4 03/03/2022      LFT     Lab Results   Component Value Date    ALT 25 11/29/2022    ALT 30 08/29/2022    ALT 25 04/12/2022    AST 17 11/29/2022    AST 23 08/29/2022    " AST 24 04/12/2022     Troponin    Lab Results   Component Value Date    TROPONINI <0.015 10/06/2019    TROPONINI 1.420 (AA) 09/26/2019    TROPONINI 0.023 02/28/2019       Assessment:     1. Arteriosclerosis of coronary artery    2. Benign essential hypertension    3. Chronic systolic heart failure    4. Mixed hyperlipidemia    10 Year Cardiovascular Risk:  The 10-year ASCVD risk score (Ervin RICH, et al., 2019) is: 7.7%    Values used to calculate the score:      Age: 54 years      Sex: Male      Is Non- : No      Diabetic: Yes      Tobacco smoker: No      Systolic Blood Pressure: 101 mmHg      Is BP treated: Yes      HDL Cholesterol: 35 mg/dL      Total Cholesterol: 132 mg/dL  BMI:  Body mass index is 40.24 kg/m².  Patient has super morbid obesity (BMI >39.9)  Tobacco:  none      Alcohol:  none  Substance abuse:  none     Last PCP visit:  2/22/2024    Plan:     MEDICATIONS:  refill cardiac medications     WORK UP:      LABS:  Order  BMP to check creatine in view of patient being on entresto     DIET:  Avoid processed foods and drinks, sugar, salt, fried/greasy foods, and fast foods    Recommend 10 servings of fruits and vegetables per day    WEIGHT:      Wt Readings from Last 3 Encounters:   04/16/24 109.7 kg (241 lb 12.8 oz)   02/22/24 (P) 115.6 kg (254 lb 12.8 oz)   08/21/23 115 kg (253 lb 9.6 oz)     EXERCISE:  continue active lifestyle    FOLLOW UP:  12 months    Raghavendra Delgado MD  Cardiology Attending     No future appointments.

## 2024-04-17 LAB
OHS QRS DURATION: 96 MS
OHS QTC CALCULATION: 415 MS

## 2024-04-25 ENCOUNTER — LAB VISIT (OUTPATIENT)
Dept: LAB | Facility: HOSPITAL | Age: 54
End: 2024-04-25
Attending: INTERNAL MEDICINE
Payer: COMMERCIAL

## 2024-04-25 DIAGNOSIS — I50.42 CHRONIC COMBINED SYSTOLIC AND DIASTOLIC HEART FAILURE: ICD-10-CM

## 2024-04-25 LAB
ANION GAP SERPL CALC-SCNC: 9 MEQ/L
BUN SERPL-MCNC: 22.7 MG/DL (ref 8.4–25.7)
CALCIUM SERPL-MCNC: 9.5 MG/DL (ref 8.4–10.2)
CHLORIDE SERPL-SCNC: 113 MMOL/L (ref 98–107)
CO2 SERPL-SCNC: 21 MMOL/L (ref 22–29)
CREAT SERPL-MCNC: 0.85 MG/DL (ref 0.73–1.18)
CREAT/UREA NIT SERPL: 27
GFR SERPLBLD CREATININE-BSD FMLA CKD-EPI: >60 MLS/MIN/1.73/M2
GLUCOSE SERPL-MCNC: 123 MG/DL (ref 74–100)
POTASSIUM SERPL-SCNC: 4 MMOL/L (ref 3.5–5.1)
SODIUM SERPL-SCNC: 143 MMOL/L (ref 136–145)

## 2024-04-25 PROCEDURE — 80048 BASIC METABOLIC PNL TOTAL CA: CPT

## 2024-04-25 PROCEDURE — 36415 COLL VENOUS BLD VENIPUNCTURE: CPT

## 2024-05-30 DIAGNOSIS — E11.9 DIABETES MELLITUS WITHOUT COMPLICATION: ICD-10-CM

## 2024-05-30 RX ORDER — FINASTERIDE 5 MG/1
5 TABLET, FILM COATED ORAL DAILY
Qty: 90 TABLET | Refills: 3 | Status: SHIPPED | OUTPATIENT
Start: 2024-05-30

## 2024-05-31 ENCOUNTER — TELEPHONE (OUTPATIENT)
Dept: INTERNAL MEDICINE | Facility: CLINIC | Age: 54
End: 2024-05-31
Payer: COMMERCIAL

## 2024-07-09 DIAGNOSIS — M25.551 HIP PAIN, RIGHT: ICD-10-CM

## 2024-07-09 DIAGNOSIS — E11.9 DIABETES MELLITUS WITHOUT COMPLICATION: ICD-10-CM

## 2024-07-09 RX ORDER — GLIMEPIRIDE 4 MG/1
4 TABLET ORAL 2 TIMES DAILY WITH MEALS
Qty: 180 TABLET | Refills: 1 | Status: CANCELLED | OUTPATIENT
Start: 2024-07-09 | End: 2025-01-05

## 2024-07-10 RX ORDER — GLIMEPIRIDE 4 MG/1
4 TABLET ORAL 2 TIMES DAILY WITH MEALS
Qty: 60 TABLET | Refills: 0 | Status: SHIPPED | OUTPATIENT
Start: 2024-07-10 | End: 2024-08-09

## 2024-07-10 RX ORDER — CYCLOBENZAPRINE HCL 10 MG
10 TABLET ORAL 2 TIMES DAILY PRN
Qty: 60 TABLET | Refills: 0 | OUTPATIENT
Start: 2024-07-10

## 2024-07-10 NOTE — TELEPHONE ENCOUNTER
Pt requesting refill for cyclobenzaprine (FLEXERIL) 10 MG tablet  and glimepiride (AMARYL) 4 MG tablet         LOV: 2/22/24    NOV: None -Attempted to contact pt to schedule , no answer. LVM to return call to [facility].

## 2024-07-26 DIAGNOSIS — I50.42 CHRONIC COMBINED SYSTOLIC AND DIASTOLIC HEART FAILURE: ICD-10-CM

## 2024-07-26 RX ORDER — SACUBITRIL AND VALSARTAN 97; 103 MG/1; MG/1
1 TABLET, FILM COATED ORAL 2 TIMES DAILY
Qty: 180 TABLET | Refills: 1 | Status: SHIPPED | OUTPATIENT
Start: 2024-07-26

## 2024-07-26 NOTE — TELEPHONE ENCOUNTER
Labs noted from April, patient needs a new rx for patient assistance program. Please Print and give to Shelia

## 2024-08-07 RX ORDER — IBUPROFEN 800 MG/1
800 TABLET ORAL EVERY 8 HOURS PRN
Qty: 90 TABLET | Refills: 1 | Status: SHIPPED | OUTPATIENT
Start: 2024-08-07

## 2024-08-14 DIAGNOSIS — E11.9 DIABETES MELLITUS WITHOUT COMPLICATION: ICD-10-CM

## 2024-08-14 RX ORDER — GLIMEPIRIDE 4 MG/1
4 TABLET ORAL 2 TIMES DAILY WITH MEALS
Qty: 60 TABLET | Refills: 0 | Status: SHIPPED | OUTPATIENT
Start: 2024-08-14 | End: 2024-09-13

## 2024-08-14 NOTE — TELEPHONE ENCOUNTER
Pharmacy requesting refill for pt's glimepiride (AMARYL) 4 MG tablet     LOV:2/22/24    NOV: 9/16/24

## 2024-09-02 DIAGNOSIS — I50.42 CHRONIC COMBINED SYSTOLIC AND DIASTOLIC HEART FAILURE: ICD-10-CM

## 2024-09-04 RX ORDER — FLUOXETINE HYDROCHLORIDE 20 MG/1
20 CAPSULE ORAL DAILY
Qty: 90 CAPSULE | Refills: 0 | Status: SHIPPED | OUTPATIENT
Start: 2024-09-04 | End: 2025-03-03

## 2024-09-09 DIAGNOSIS — E11.65 UNCONTROLLED TYPE 2 DIABETES MELLITUS WITH HYPERGLYCEMIA: Primary | ICD-10-CM

## 2024-09-09 DIAGNOSIS — E78.2 MIXED HYPERLIPIDEMIA: ICD-10-CM

## 2024-09-09 NOTE — PROGRESS NOTES
CECILE Nagy   [facility]  [address] [facility] [address]      PATIENT NAME: Mynor Recio  : 1970  DATE: 9/10/24  MRN: [id number]      Reason for Visit / Chief Complaint: Follow-up (Did not do labs prior to visit)       History of Present Illness / Problem Focused Workflow     Mynor Recio presents to the clinic with Follow-up (Did not do labs prior to visit)     54 y.o. White male presents to the clinic.  Medical problems include diabetes, hypertension, HLD, HFrEF (LVEF 30-35%; 2019), and s/p PCI with stent x2 (OM1 and mid circumflex; 2019), ventral hernia, morbid obesity, depression, mild DDD lumbar, and tobacco abuse. Followed by [facility] cardio clinic.      09/10/24  Pt presents to clinic for DM follow up. Pt did not complete his labs prior to the apt. Will go today and RTC for lab review in 2-3 weeks. BP is at goal today and pt has lost approx 20 lbs since last visit with diet efforts. He declines vaccines today. Medications reviewed and refilled appropriately. DM foot and eye exam performed today. He is agreeable to FIT test for colorectal cancer screening.           Review of Systems     Review of Systems   Constitutional:  Negative for fatigue, fever and unexpected weight change.   HENT:  Negative for ear pain, hearing loss, trouble swallowing and voice change.    Respiratory:  Negative for cough and shortness of breath.    Cardiovascular:  Negative for chest pain and palpitations.   Gastrointestinal:  Negative for abdominal pain, diarrhea and vomiting.   Genitourinary:  Negative for dysuria.   Musculoskeletal:  Negative for gait problem.   Skin:  Negative for rash and wound.   Neurological:  Negative for weakness.   Psychiatric/Behavioral:  Negative for suicidal ideas.          Medications and Allergies     Medications  Medication List with Changes/Refills   Current Medications    ASPIRIN (ECOTRIN) 81 MG  EC TABLET    Take 1 tablet (81 mg total) by mouth once daily.    ATORVASTATIN (LIPITOR) 80 MG TABLET    Take 1 tablet (80 mg total) by mouth once daily.    EZETIMIBE (ZETIA) 10 MG TABLET    Take 1 tablet (10 mg total) by mouth once daily.    FENOFIBRATE (TRICOR) 145 MG TABLET    Take 1 tablet (145 mg total) by mouth once daily.    GLIMEPIRIDE (AMARYL) 4 MG TABLET    Take 1 tablet (4 mg total) by mouth 2 (two) times daily with meals.    JANUVIA 100 MG TAB    Take 1 tablet (100 mg total) by mouth once daily.    METFORMIN (GLUCOPHAGE) 1000 MG TABLET    Take 1 tablet (1,000 mg total) by mouth 2 (two) times daily with meals.   Changed and/or Refilled Medications    Modified Medication Previous Medication    FINASTERIDE (PROSCAR) 5 MG TABLET finasteride (PROSCAR) 5 mg tablet       Take 1 tablet (5 mg total) by mouth once daily.    Take 1 tablet (5 mg total) by mouth once daily.    FLUOXETINE 20 MG CAPSULE FLUoxetine 20 MG capsule       Take 1 capsule (20 mg total) by mouth once daily.    Take 1 capsule (20 mg total) by mouth once daily.    IBUPROFEN (ADVIL,MOTRIN) 800 MG TABLET ibuprofen (ADVIL,MOTRIN) 800 MG tablet       Take 1 tablet (800 mg total) by mouth every 8 (eight) hours as needed for Pain.    Take 1 tablet (800 mg total) by mouth every 8 (eight) hours as needed.    METOPROLOL SUCCINATE (TOPROL-XL) 50 MG 24 HR TABLET metoprolol succinate (TOPROL-XL) 50 MG 24 hr tablet       Take 1 tablet (50 mg total) by mouth once daily.    Take 1 tablet (50 mg total) by mouth once daily.    SACUBITRIL-VALSARTAN (ENTRESTO)  MG PER TABLET sacubitriL-valsartan (ENTRESTO)  mg per tablet       Take 1 tablet by mouth 2 (two) times daily.    Take 1 tablet by mouth 2 (two) times daily.   Discontinued Medications    CYCLOBENZAPRINE (FLEXERIL) 10 MG TABLET    Take 1 tablet (10 mg total) by mouth 2 (two) times daily as needed for Muscle spasms.    SEMAGLUTIDE (OZEMPIC) 0.25 MG OR 0.5 MG (2 MG/3 ML) PEN INJECTOR    Inject 0.5  mg into the skin every 7 days.         Allergies  Review of patient's allergies indicates:  No Known Allergies    Physical Examination     Vitals:    09/10/24 1436   BP: 130/79   Pulse: 74   Resp: 16   Temp: 97.9 °F (36.6 °C)     Physical Exam  Constitutional:       Appearance: Normal appearance.   Cardiovascular:      Rate and Rhythm: Normal rate and regular rhythm.      Pulses:           Dorsalis pedis pulses are 2+ on the right side and 2+ on the left side.        Posterior tibial pulses are 2+ on the right side and 2+ on the left side.   Pulmonary:      Effort: Pulmonary effort is normal.      Breath sounds: Normal breath sounds.   Musculoskeletal:         General: Normal range of motion.      Cervical back: Normal range of motion.   Feet:      Right foot:      Protective Sensation: 9 sites tested.  9 sites sensed.      Skin integrity: Skin integrity normal.      Toenail Condition: Right toenails are normal.      Left foot:      Protective Sensation: 9 sites tested.  9 sites sensed.      Skin integrity: Skin integrity normal.      Toenail Condition: Left toenails are normal.   Skin:     General: Skin is warm and dry.   Neurological:      General: No focal deficit present.      Mental Status: He is alert and oriented to person, place, and time.   Psychiatric:         Mood and Affect: Mood normal.           Results     Lab Results   Component Value Date    WBC 9.18 05/15/2023    RBC 4.10 (L) 05/15/2023    HGB 12.9 (L) 05/15/2023    HCT 37.1 (L) 05/15/2023    MCV 90.5 05/15/2023    MCH 31.5 (H) 05/15/2023    MCHC 34.8 05/15/2023    RDW 12.6 05/15/2023     05/15/2023    MPV 10.2 05/15/2023     Sodium   Date Value Ref Range Status   04/25/2024 143 136 - 145 mmol/L Final     Potassium   Date Value Ref Range Status   04/25/2024 4.0 3.5 - 5.1 mmol/L Final     Chloride   Date Value Ref Range Status   04/25/2024 113 (H) 98 - 107 mmol/L Final     CO2   Date Value Ref Range Status   04/25/2024 21 (L) 22 - 29 mmol/L  Final     Blood Urea Nitrogen   Date Value Ref Range Status   04/25/2024 22.7 8.4 - 25.7 mg/dL Final     Creatinine   Date Value Ref Range Status   04/25/2024 0.85 0.73 - 1.18 mg/dL Final     Calcium   Date Value Ref Range Status   04/25/2024 9.5 8.4 - 10.2 mg/dL Final     Albumin   Date Value Ref Range Status   11/29/2022 3.9 3.5 - 5.0 gm/dL Final     Bilirubin Total   Date Value Ref Range Status   11/29/2022 0.6 <=1.5 mg/dL Final     ALP   Date Value Ref Range Status   11/29/2022 60 40 - 150 unit/L Final     AST   Date Value Ref Range Status   11/29/2022 17 5 - 34 unit/L Final     ALT   Date Value Ref Range Status   11/29/2022 25 0 - 55 unit/L Final     Estimated GFR-Non    Date Value Ref Range Status   04/12/2022 72 >=90      Lab Results   Component Value Date    CHOL 132 05/15/2023     Lab Results   Component Value Date    HDL 35 05/15/2023     Lab Results   Component Value Date    TRIG 100 05/15/2023     Lab Results   Component Value Date    VLDL 20 05/15/2023     Lab Results   Component Value Date    LDL 77.00 05/15/2023     Lab Results   Component Value Date    TSH 0.611 05/15/2023     Lab Results   Component Value Date    PHUR 5.5 05/15/2023    PROTEINUA Negative 05/15/2023    GLUCUA 4+ (A) 05/15/2023    KETONESU Negative 03/03/2022    OCCULTUA Negative 05/15/2023    NITRITE Negative 05/15/2023    LEUKOCYTESUR Negative 05/15/2023     Lab Results   Component Value Date    HGBA1C 6.7 08/15/2023    HGBA1C 8.6 (H) 05/15/2023    HGBA1C 7.5 (H) 11/29/2022           Assessment         ICD-10-CM ICD-9-CM   1. Benign essential hypertension  I10 401.1   2. Uncontrolled type 2 diabetes mellitus with hyperglycemia  E11.65 250.02   3. Screening for malignant neoplasm of prostate  Z12.5 V76.44   4. Screening for colorectal cancer  Z12.11 V76.51    Z12.12 V76.41   5. Chronic combined systolic and diastolic heart failure  I50.42 428.42   6. Hip pain, right  M25.551 719.45   7. Depressive disorder  F32.A 311    8. Mixed hyperlipidemia  E78.2 272.2       Plan      Problem List Items Addressed This Visit          Psychiatric    Depressive disorder (Chronic)    Current Assessment & Plan     Continue fluoxetine.  Denies SI/HI.  Read positive daily meditations, avoid negative media, set healthy boundaries.  Exercise daily, keep consistent sleep pattern, eat a healthy diet.  Establish good social support, make changes to reduce stress.  Do not drink alcohol or use illicit drugs.  Reports any symptoms of suicidal/homicidal ideations or self harm immediately, go to nearest emergency room.             Cardiac/Vascular    Benign essential hypertension - Primary (Chronic)    Current Assessment & Plan     Continue entresto.  Keep cardio appts/diagnostics as scheduled.  Follow a low sodium (less than 2 grams of sodium per day), DASH diet.   Continue medications as prescribed.  Monitor blood pressure and report any consistent values greater than 140/90 and keep a log.  Maintain healthy weight with a BMI goal of <30.   Aerobic exercise for 150 minutes per week (or 5 days a week for 30 minutes each day).         Mixed hyperlipidemia    Current Assessment & Plan     Cardiology managing.  Follow a low cholesterol, low saturated fat diet with less than 200 mg of cholesterol a day.   Avoid fried foods and high saturated fats (de jesus, sausage, cookies, cakes, chips, cheese, whole milk, butter, mayonnaise, creamy dressings, gravy and cream sauces).  Add flax seed or fish oil supplements to diet.   Increase dietary fiber.   Regular exercise improves cholesterol levels.  Physical activity 5 times a week for 30 minutes per day (or 150 minutes per week).   Stressed importance of dietary modifications.             Endocrine    Uncontrolled type 2 diabetes mellitus with hyperglycemia    Current Assessment & Plan     Complete labs today- lab review in 2-3 weeks. Will refill meds at that time  Will attempt to continue dietary modifications.   Continue  medications as prescribed.  Follow ADA diet.  Avoid soda, simple sweets, and limit rice/pasta/bread/starches and consume brown options when possible.   Maintain healthy weight with BMI goal <30.   Perform aerobic exercise for 150 minutes per week (or 5 days a week for 30 minutes each day).   Examine feet daily.   Obtain annual dilated eye exam.          Relevant Orders    Diabetic Eye Screening Photo     Other Visit Diagnoses       Screening for malignant neoplasm of prostate        Relevant Orders    PSA, Screening    Screening for colorectal cancer        Relevant Orders    OCCULT BLOOD FECAL IMMUNOASSAY    Chronic combined systolic and diastolic heart failure        Relevant Medications    finasteride (PROSCAR) 5 mg tablet    FLUoxetine 20 MG capsule    metoprolol succinate (TOPROL-XL) 50 MG 24 hr tablet    sacubitriL-valsartan (ENTRESTO)  mg per tablet    Hip pain, right        Relevant Medications    ibuprofen (ADVIL,MOTRIN) 800 MG tablet            Future Appointments   Date Time Provider Department Center   10/15/2024  1:40 PM Ria Galeas FNP [facility] [place]   4/22/2025  2:00 PM Raghavendra Delgado MD [facility]            Signature:      [facility]  [address] [facility] [address]    Date of encounter: 9/10/24

## 2024-09-10 ENCOUNTER — LAB VISIT (OUTPATIENT)
Dept: LAB | Facility: HOSPITAL | Age: 54
End: 2024-09-10
Payer: COMMERCIAL

## 2024-09-10 ENCOUNTER — CLINICAL SUPPORT (OUTPATIENT)
Dept: INTERNAL MEDICINE | Facility: CLINIC | Age: 54
End: 2024-09-10
Payer: COMMERCIAL

## 2024-09-10 ENCOUNTER — OFFICE VISIT (OUTPATIENT)
Dept: INTERNAL MEDICINE | Facility: CLINIC | Age: 54
End: 2024-09-10
Payer: COMMERCIAL

## 2024-09-10 VITALS
OXYGEN SATURATION: 96 % | SYSTOLIC BLOOD PRESSURE: 130 MMHG | RESPIRATION RATE: 16 BRPM | DIASTOLIC BLOOD PRESSURE: 79 MMHG | HEART RATE: 74 BPM | WEIGHT: 235.5 LBS | BODY MASS INDEX: 39.24 KG/M2 | HEIGHT: 65 IN | TEMPERATURE: 98 F

## 2024-09-10 DIAGNOSIS — I50.42 CHRONIC COMBINED SYSTOLIC AND DIASTOLIC HEART FAILURE: ICD-10-CM

## 2024-09-10 DIAGNOSIS — M25.551 HIP PAIN, RIGHT: ICD-10-CM

## 2024-09-10 DIAGNOSIS — Z12.11 SCREENING FOR COLORECTAL CANCER: ICD-10-CM

## 2024-09-10 DIAGNOSIS — E11.65 UNCONTROLLED TYPE 2 DIABETES MELLITUS WITH HYPERGLYCEMIA: ICD-10-CM

## 2024-09-10 DIAGNOSIS — Z12.5 SCREENING FOR MALIGNANT NEOPLASM OF PROSTATE: ICD-10-CM

## 2024-09-10 DIAGNOSIS — Z12.12 SCREENING FOR COLORECTAL CANCER: ICD-10-CM

## 2024-09-10 DIAGNOSIS — E78.2 MIXED HYPERLIPIDEMIA: ICD-10-CM

## 2024-09-10 DIAGNOSIS — I10 BENIGN ESSENTIAL HYPERTENSION: Primary | Chronic | ICD-10-CM

## 2024-09-10 DIAGNOSIS — F32.A DEPRESSIVE DISORDER: Chronic | ICD-10-CM

## 2024-09-10 PROBLEM — B35.1 TINEA UNGUIUM: Status: RESOLVED | Noted: 2022-09-19 | Resolved: 2024-09-10

## 2024-09-10 PROBLEM — E11.69 ONYCHOMYCOSIS OF MULTIPLE TOENAILS WITH TYPE 2 DIABETES MELLITUS: Status: RESOLVED | Noted: 2022-09-19 | Resolved: 2024-09-10

## 2024-09-10 PROBLEM — E11.9 DIABETES MELLITUS: Chronic | Status: RESOLVED | Noted: 2022-09-01 | Resolved: 2024-09-10

## 2024-09-10 PROBLEM — E66.9 OBESITY (BMI 30-39.9): Status: ACTIVE | Noted: 2022-09-01

## 2024-09-10 PROBLEM — B35.1 ONYCHOMYCOSIS OF MULTIPLE TOENAILS WITH TYPE 2 DIABETES MELLITUS: Status: RESOLVED | Noted: 2022-09-19 | Resolved: 2024-09-10

## 2024-09-10 LAB
ANION GAP SERPL CALC-SCNC: 6 MEQ/L
BUN SERPL-MCNC: 22.3 MG/DL (ref 8.4–25.7)
CALCIUM SERPL-MCNC: 9.9 MG/DL (ref 8.4–10.2)
CHLORIDE SERPL-SCNC: 113 MMOL/L (ref 98–107)
CHOLEST SERPL-MCNC: 126 MG/DL
CHOLEST/HDLC SERPL: 3 {RATIO} (ref 0–5)
CO2 SERPL-SCNC: 27 MMOL/L (ref 22–29)
CREAT SERPL-MCNC: 0.96 MG/DL (ref 0.73–1.18)
CREAT/UREA NIT SERPL: 23
EST. AVERAGE GLUCOSE BLD GHB EST-MCNC: 114 MG/DL
GFR SERPLBLD CREATININE-BSD FMLA CKD-EPI: >60 ML/MIN/1.73/M2
GLUCOSE SERPL-MCNC: 97 MG/DL (ref 74–100)
HBA1C MFR BLD: 5.6 %
HDLC SERPL-MCNC: 49 MG/DL (ref 35–60)
LDLC SERPL CALC-MCNC: 59 MG/DL (ref 50–140)
POTASSIUM SERPL-SCNC: 4 MMOL/L (ref 3.5–5.1)
PSA SERPL-MCNC: 0.11 NG/ML
SODIUM SERPL-SCNC: 146 MMOL/L (ref 136–145)
TRIGL SERPL-MCNC: 92 MG/DL (ref 34–140)
VLDLC SERPL CALC-MCNC: 18 MG/DL

## 2024-09-10 PROCEDURE — 80061 LIPID PANEL: CPT

## 2024-09-10 PROCEDURE — 2025F 7 FLD RTA PHOTO W/O RTNOPTHY: CPT | Mod: CPTII,,, | Performed by: OPTOMETRIST

## 2024-09-10 PROCEDURE — 99215 OFFICE O/P EST HI 40 MIN: CPT | Mod: PBBFAC

## 2024-09-10 PROCEDURE — 36415 COLL VENOUS BLD VENIPUNCTURE: CPT

## 2024-09-10 PROCEDURE — 92228 IMG RTA DETC/MNTR DS PHY/QHP: CPT | Mod: TC,PBBFAC

## 2024-09-10 PROCEDURE — 84153 ASSAY OF PSA TOTAL: CPT

## 2024-09-10 PROCEDURE — 83036 HEMOGLOBIN GLYCOSYLATED A1C: CPT

## 2024-09-10 PROCEDURE — 92228 IMG RTA DETC/MNTR DS PHY/QHP: CPT | Mod: 26,S$PBB,, | Performed by: OPTOMETRIST

## 2024-09-10 PROCEDURE — 92228 IMG RTA DETC/MNTR DS PHY/QHP: CPT | Mod: PBBFAC

## 2024-09-10 PROCEDURE — 80048 BASIC METABOLIC PNL TOTAL CA: CPT

## 2024-09-10 RX ORDER — FINASTERIDE 5 MG/1
5 TABLET, FILM COATED ORAL DAILY
Qty: 90 TABLET | Refills: 2 | Status: SHIPPED | OUTPATIENT
Start: 2024-09-10 | End: 2025-06-07

## 2024-09-10 RX ORDER — METOPROLOL SUCCINATE 50 MG/1
50 TABLET, EXTENDED RELEASE ORAL DAILY
Qty: 90 TABLET | Refills: 2 | Status: SHIPPED | OUTPATIENT
Start: 2024-09-10 | End: 2025-06-07

## 2024-09-10 RX ORDER — SACUBITRIL AND VALSARTAN 97; 103 MG/1; MG/1
1 TABLET, FILM COATED ORAL 2 TIMES DAILY
Qty: 180 TABLET | Refills: 2 | Status: SHIPPED | OUTPATIENT
Start: 2024-09-10 | End: 2025-06-07

## 2024-09-10 RX ORDER — FLUOXETINE HYDROCHLORIDE 20 MG/1
20 CAPSULE ORAL DAILY
Qty: 90 CAPSULE | Refills: 2 | Status: SHIPPED | OUTPATIENT
Start: 2024-09-10 | End: 2025-06-07

## 2024-09-10 RX ORDER — IBUPROFEN 800 MG/1
800 TABLET ORAL EVERY 8 HOURS PRN
Qty: 90 TABLET | Refills: 1 | Status: SHIPPED | OUTPATIENT
Start: 2024-09-10 | End: 2025-06-07

## 2024-09-10 NOTE — PROGRESS NOTES
Mynor Recio is a 54 y.o. male here for a diabetic eye screening with non-dilated fundus photos per CECILE Castellanos.    Patient cooperative?: Yes  Small pupils?: No  Last eye exam: unknown    For exam results, see Encounter Report.

## 2024-09-10 NOTE — ASSESSMENT & PLAN NOTE
Complete labs today- lab review in 2-3 weeks. Will refill meds at that time  Will attempt to continue dietary modifications.   Continue medications as prescribed.  Follow ADA diet.  Avoid soda, simple sweets, and limit rice/pasta/bread/starches and consume brown options when possible.   Maintain healthy weight with BMI goal <30.   Perform aerobic exercise for 150 minutes per week (or 5 days a week for 30 minutes each day).   Examine feet daily.   Obtain annual dilated eye exam.

## 2024-09-10 NOTE — ASSESSMENT & PLAN NOTE
Cardiology managing.  Follow a low cholesterol, low saturated fat diet with less than 200 mg of cholesterol a day.   Avoid fried foods and high saturated fats (de jesus, sausage, cookies, cakes, chips, cheese, whole milk, butter, mayonnaise, creamy dressings, gravy and cream sauces).  Add flax seed or fish oil supplements to diet.   Increase dietary fiber.   Regular exercise improves cholesterol levels.  Physical activity 5 times a week for 30 minutes per day (or 150 minutes per week).   Stressed importance of dietary modifications.

## 2024-09-17 DIAGNOSIS — E11.9 DIABETES MELLITUS WITHOUT COMPLICATION: ICD-10-CM

## 2024-09-18 RX ORDER — GLIMEPIRIDE 4 MG/1
4 TABLET ORAL 2 TIMES DAILY WITH MEALS
Qty: 60 TABLET | Refills: 0 | Status: SHIPPED | OUTPATIENT
Start: 2024-09-18 | End: 2024-10-18

## 2024-09-26 ENCOUNTER — APPOINTMENT (OUTPATIENT)
Dept: LAB | Facility: HOSPITAL | Age: 54
End: 2024-09-26
Payer: COMMERCIAL

## 2024-09-26 DIAGNOSIS — E11.9 DIABETES MELLITUS WITHOUT COMPLICATION: ICD-10-CM

## 2024-09-26 RX ORDER — METFORMIN HYDROCHLORIDE 1000 MG/1
1000 TABLET ORAL 2 TIMES DAILY WITH MEALS
Qty: 180 TABLET | Refills: 0 | Status: SHIPPED | OUTPATIENT
Start: 2024-09-26

## 2024-09-27 ENCOUNTER — PATIENT MESSAGE (OUTPATIENT)
Dept: INTERNAL MEDICINE | Facility: CLINIC | Age: 54
End: 2024-09-27
Payer: COMMERCIAL

## 2024-10-15 ENCOUNTER — OFFICE VISIT (OUTPATIENT)
Dept: INTERNAL MEDICINE | Facility: CLINIC | Age: 54
End: 2024-10-15
Payer: COMMERCIAL

## 2024-10-15 VITALS
OXYGEN SATURATION: 94 % | HEART RATE: 71 BPM | HEIGHT: 65 IN | RESPIRATION RATE: 18 BRPM | WEIGHT: 233.31 LBS | BODY MASS INDEX: 38.87 KG/M2 | TEMPERATURE: 98 F | DIASTOLIC BLOOD PRESSURE: 62 MMHG | SYSTOLIC BLOOD PRESSURE: 118 MMHG

## 2024-10-15 DIAGNOSIS — Z00.00 WELL ADULT EXAM: ICD-10-CM

## 2024-10-15 DIAGNOSIS — R19.5 POSITIVE FIT (FECAL IMMUNOCHEMICAL TEST): ICD-10-CM

## 2024-10-15 DIAGNOSIS — E78.2 MIXED HYPERLIPIDEMIA: ICD-10-CM

## 2024-10-15 DIAGNOSIS — E11.9 TYPE 2 DIABETES MELLITUS WITHOUT COMPLICATION, WITHOUT LONG-TERM CURRENT USE OF INSULIN: Primary | ICD-10-CM

## 2024-10-15 PROCEDURE — 3008F BODY MASS INDEX DOCD: CPT | Mod: CPTII,,,

## 2024-10-15 PROCEDURE — 4010F ACE/ARB THERAPY RXD/TAKEN: CPT | Mod: CPTII,,,

## 2024-10-15 PROCEDURE — 3044F HG A1C LEVEL LT 7.0%: CPT | Mod: CPTII,,,

## 2024-10-15 PROCEDURE — 3074F SYST BP LT 130 MM HG: CPT | Mod: CPTII,,,

## 2024-10-15 PROCEDURE — 3061F NEG MICROALBUMINURIA REV: CPT | Mod: CPTII,,,

## 2024-10-15 PROCEDURE — 99215 OFFICE O/P EST HI 40 MIN: CPT | Mod: PBBFAC

## 2024-10-15 PROCEDURE — 3078F DIAST BP <80 MM HG: CPT | Mod: CPTII,,,

## 2024-10-15 PROCEDURE — 99214 OFFICE O/P EST MOD 30 MIN: CPT | Mod: S$PBB,,,

## 2024-10-15 PROCEDURE — 3066F NEPHROPATHY DOC TX: CPT | Mod: CPTII,,,

## 2024-10-15 PROCEDURE — 1159F MED LIST DOCD IN RCRD: CPT | Mod: CPTII,,,

## 2024-10-15 PROCEDURE — 1160F RVW MEDS BY RX/DR IN RCRD: CPT | Mod: CPTII,,,

## 2024-10-15 RX ORDER — METFORMIN HYDROCHLORIDE 1000 MG/1
1000 TABLET ORAL 2 TIMES DAILY WITH MEALS
Qty: 180 TABLET | Refills: 2 | Status: SHIPPED | OUTPATIENT
Start: 2024-10-15 | End: 2025-07-12

## 2024-10-15 RX ORDER — SITAGLIPTIN 100 MG/1
100 TABLET, FILM COATED ORAL DAILY
Qty: 90 TABLET | Refills: 2 | Status: SHIPPED | OUTPATIENT
Start: 2024-10-15 | End: 2025-07-12

## 2024-10-15 RX ORDER — GLIMEPIRIDE 4 MG/1
4 TABLET ORAL 2 TIMES DAILY WITH MEALS
Qty: 90 TABLET | Refills: 2 | Status: SHIPPED | OUTPATIENT
Start: 2024-10-15

## 2024-10-15 NOTE — PROGRESS NOTES
CECILE Nagy   [facility]  [address] [facility] [address]      PATIENT NAME: Mynor Recio  : 1970  DATE: 10/15/24  MRN: [id number]      Reason for Visit / Chief Complaint: Follow-up, Results, and Medication Refill (Voiced no C/O.)       History of Present Illness / Problem Focused Workflow     Mynor Recio presents to the clinic with Follow-up, Results, and Medication Refill (Voiced no C/O.)     54 y.o. male presents to the clinic. Medical problems include diabetes, hypertension, HLD, HFrEF (LVEF 30-35%; 2019), and s/p PCI with stent x2 (OM1 and mid circumflex; 2019), ventral hernia, morbid obesity, depression, mild DDD lumbar, and tobacco abuse. Followed by [facility] cardio clinic.      09/10/24  Pt presents to clinic for DM follow up. Pt did not complete his labs prior to the apt. Will go today and RTC for lab review in 2-3 weeks. BP is at goal today and pt has lost approx 20 lbs since last visit with diet efforts. He declines vaccines today. Medications reviewed and refilled appropriately. DM foot and eye exam performed today. He is agreeable to FIT test for colorectal cancer screening.     10/15/24  Pt presents for DM lab review, A1C at goal at 5.6. pt labs otherwise unremarkable. FIT test was positive, informed pt and referral placed for colonoscopy. Denies acte complaints, declines vaccines.           Review of Systems     Review of Systems   Constitutional:  Negative for fatigue, fever and unexpected weight change.   HENT:  Negative for ear pain, hearing loss, trouble swallowing and voice change.    Respiratory:  Negative for cough and shortness of breath.    Cardiovascular:  Negative for chest pain and palpitations.   Gastrointestinal:  Negative for abdominal pain, diarrhea and vomiting.   Genitourinary:  Negative for dysuria.   Musculoskeletal:  Negative for gait problem.   Skin:  Negative for rash and  wound.   Neurological:  Negative for weakness.   Psychiatric/Behavioral:  Negative for suicidal ideas.          Medications and Allergies     Medications  Medication List with Changes/Refills   Current Medications    ASPIRIN (ECOTRIN) 81 MG EC TABLET    Take 1 tablet (81 mg total) by mouth once daily.    ATORVASTATIN (LIPITOR) 80 MG TABLET    Take 1 tablet (80 mg total) by mouth once daily.    EZETIMIBE (ZETIA) 10 MG TABLET    Take 1 tablet (10 mg total) by mouth once daily.    FENOFIBRATE (TRICOR) 145 MG TABLET    Take 1 tablet (145 mg total) by mouth once daily.    FINASTERIDE (PROSCAR) 5 MG TABLET    Take 1 tablet (5 mg total) by mouth once daily.    FLUOXETINE 20 MG CAPSULE    Take 1 capsule (20 mg total) by mouth once daily.    IBUPROFEN (ADVIL,MOTRIN) 800 MG TABLET    Take 1 tablet (800 mg total) by mouth every 8 (eight) hours as needed for Pain.    METOPROLOL SUCCINATE (TOPROL-XL) 50 MG 24 HR TABLET    Take 1 tablet (50 mg total) by mouth once daily.    SACUBITRIL-VALSARTAN (ENTRESTO)  MG PER TABLET    Take 1 tablet by mouth 2 (two) times daily.   Changed and/or Refilled Medications    Modified Medication Previous Medication    GLIMEPIRIDE (AMARYL) 4 MG TABLET glimepiride (AMARYL) 4 MG tablet       Take 1 tablet (4 mg total) by mouth 2 (two) times daily with meals.    Take 1 tablet (4 mg total) by mouth 2 (two) times daily with meals.    JANUVIA 100 MG TAB JANUVIA 100 mg Tab       Take 1 tablet (100 mg total) by mouth once daily.    Take 1 tablet (100 mg total) by mouth once daily.    METFORMIN (GLUCOPHAGE) 1000 MG TABLET metFORMIN (GLUCOPHAGE) 1000 MG tablet       Take 1 tablet (1,000 mg total) by mouth 2 (two) times daily with meals.    TAKE ONE TABLET BY MOUTH TWICE A DAY WITH MEALS         Allergies  Review of patient's allergies indicates:  No Known Allergies    Physical Examination     Vitals:    10/15/24 1345   BP: 118/62   Pulse: 71   Resp: 18   Temp: 98.3 °F (36.8 °C)     Physical Exam  Vitals  and nursing note reviewed.   Constitutional:       General: He is not in acute distress.     Appearance: He is not ill-appearing.   HENT:      Head: Normocephalic and atraumatic.      Mouth/Throat:      Mouth: Mucous membranes are moist.      Pharynx: Oropharynx is clear.   Eyes:      General: No scleral icterus.     Extraocular Movements: Extraocular movements intact.      Conjunctiva/sclera: Conjunctivae normal.      Pupils: Pupils are equal, round, and reactive to light.   Neck:      Vascular: No carotid bruit.   Cardiovascular:      Rate and Rhythm: Normal rate and regular rhythm.      Heart sounds: No murmur heard.     No friction rub. No gallop.   Pulmonary:      Effort: Pulmonary effort is normal. No respiratory distress.      Breath sounds: Normal breath sounds. No wheezing, rhonchi or rales.   Abdominal:      General: Abdomen is flat. Bowel sounds are normal. There is no distension.      Palpations: Abdomen is soft. There is no mass.      Tenderness: There is no abdominal tenderness.   Musculoskeletal:         General: Normal range of motion.      Cervical back: Normal range of motion and neck supple.   Skin:     General: Skin is warm and dry.   Neurological:      General: No focal deficit present.      Mental Status: He is alert.   Psychiatric:         Mood and Affect: Mood normal.           Results     Lab Results   Component Value Date    WBC 9.18 05/15/2023    RBC 4.10 (L) 05/15/2023    HGB 12.9 (L) 05/15/2023    HCT 37.1 (L) 05/15/2023    MCV 90.5 05/15/2023    MCH 31.5 (H) 05/15/2023    MCHC 34.8 05/15/2023    RDW 12.6 05/15/2023     05/15/2023    MPV 10.2 05/15/2023     Sodium   Date Value Ref Range Status   09/10/2024 146 (H) 136 - 145 mmol/L Final     Potassium   Date Value Ref Range Status   09/10/2024 4.0 3.5 - 5.1 mmol/L Final     Chloride   Date Value Ref Range Status   09/10/2024 113 (H) 98 - 107 mmol/L Final     CO2   Date Value Ref Range Status   09/10/2024 27 22 - 29 mmol/L Final      Blood Urea Nitrogen   Date Value Ref Range Status   09/10/2024 22.3 8.4 - 25.7 mg/dL Final     Creatinine   Date Value Ref Range Status   09/10/2024 0.96 0.73 - 1.18 mg/dL Final     Calcium   Date Value Ref Range Status   09/10/2024 9.9 8.4 - 10.2 mg/dL Final     Albumin   Date Value Ref Range Status   11/29/2022 3.9 3.5 - 5.0 gm/dL Final     Bilirubin Total   Date Value Ref Range Status   11/29/2022 0.6 <=1.5 mg/dL Final     ALP   Date Value Ref Range Status   11/29/2022 60 40 - 150 unit/L Final     AST   Date Value Ref Range Status   11/29/2022 17 5 - 34 unit/L Final     ALT   Date Value Ref Range Status   11/29/2022 25 0 - 55 unit/L Final     Estimated GFR-Non    Date Value Ref Range Status   04/12/2022 72 >=90      Lab Results   Component Value Date    CHOL 126 09/10/2024     Lab Results   Component Value Date    HDL 49 09/10/2024     Lab Results   Component Value Date    TRIG 92 09/10/2024     Lab Results   Component Value Date    VLDL 18 09/10/2024     Lab Results   Component Value Date    LDL 59.00 09/10/2024     Lab Results   Component Value Date    TSH 0.611 05/15/2023     Lab Results   Component Value Date    PHUR 7.5 09/10/2024    PROTEINUA Negative 09/10/2024    GLUCUA 1+ (A) 09/10/2024    KETONESU Negative 03/03/2022    OCCULTUA Negative 09/10/2024    NITRITE Negative 09/10/2024    LEUKOCYTESUR Negative 09/10/2024     Lab Results   Component Value Date    HGBA1C 5.6 09/10/2024    HGBA1C 6.7 08/15/2023    HGBA1C 8.6 (H) 05/15/2023           Assessment         ICD-10-CM ICD-9-CM   1. Type 2 diabetes mellitus without complication, without long-term current use of insulin  E11.9 250.00   2. Positive FIT (fecal immunochemical test)  R19.5 792.1   3. Well adult exam  Z00.00 V70.0   4. Mixed hyperlipidemia  E78.2 272.2       Plan      Problem List Items Addressed This Visit       Mixed hyperlipidemia    Current Assessment & Plan     Cardiology managing.  Follow a low cholesterol, low  saturated fat diet with less than 200 mg of cholesterol a day.   Avoid fried foods and high saturated fats (de jesus, sausage, cookies, cakes, chips, cheese, whole milk, butter, mayonnaise, creamy dressings, gravy and cream sauces).  Add flax seed or fish oil supplements to diet.   Increase dietary fiber.   Regular exercise improves cholesterol levels.  Physical activity 5 times a week for 30 minutes per day (or 150 minutes per week).   Stressed importance of dietary modifications.          Type 2 diabetes mellitus without complication, without long-term current use of insulin - Primary    Current Assessment & Plan     A1C at goal  Continue glipizide, metformin and januvia  Will attempt to continue dietary modifications.   Continue medications as prescribed.  Follow ADA diet.  Avoid soda, simple sweets, and limit rice/pasta/bread/starches and consume brown options when possible.   Maintain healthy weight with BMI goal <30.   Perform aerobic exercise for 150 minutes per week (or 5 days a week for 30 minutes each day).   Examine feet daily.   Obtain annual dilated eye exam.          Relevant Medications    glimepiride (AMARYL) 4 MG tablet    JANUVIA 100 mg Tab    metFORMIN (GLUCOPHAGE) 1000 MG tablet    Other Relevant Orders    Urinalysis, Reflex to Urine Culture    Comprehensive Metabolic Panel    Hemoglobin A1C     Other Visit Diagnoses       Positive FIT (fecal immunochemical test)        Relevant Orders    Ambulatory referral/consult to Endo Procedure     Well adult exam        Relevant Orders    Urinalysis, Reflex to Urine Culture    CBC Auto Differential    Comprehensive Metabolic Panel    Hemoglobin A1C    Lipid Panel    T4, Free    TSH            Future Appointments   Date Time Provider Department Center   4/15/2025 12:40 PM Ria Galeas FNP [facility] [place]   4/22/2025  2:00 PM Raghavendra Delgado MD [facility]ON[facility] [place]            Signature:      [facility]  [address] [facility] [address]    Date of encounter: 10/15/24

## 2024-10-15 NOTE — ASSESSMENT & PLAN NOTE
A1C at goal  Continue glipizide, metformin and januvia  Will attempt to continue dietary modifications.   Continue medications as prescribed.  Follow ADA diet.  Avoid soda, simple sweets, and limit rice/pasta/bread/starches and consume brown options when possible.   Maintain healthy weight with BMI goal <30.   Perform aerobic exercise for 150 minutes per week (or 5 days a week for 30 minutes each day).   Examine feet daily.   Obtain annual dilated eye exam.

## 2025-01-27 DIAGNOSIS — I10 BENIGN ESSENTIAL HYPERTENSION: Primary | ICD-10-CM

## 2025-01-27 DIAGNOSIS — I50.42 CHRONIC COMBINED SYSTOLIC AND DIASTOLIC HEART FAILURE: ICD-10-CM

## 2025-01-27 RX ORDER — SACUBITRIL AND VALSARTAN 97; 103 MG/1; MG/1
1 TABLET, FILM COATED ORAL 2 TIMES DAILY
Qty: 180 TABLET | Refills: 2 | Status: SHIPPED | OUTPATIENT
Start: 2025-01-27 | End: 2025-10-24

## 2025-01-27 NOTE — TELEPHONE ENCOUNTER
Lvm for pt to contact clinic, he will need updated labs           Camille Francis [facility] Cardiology Clinical Support Staff  Caller: Unspecified (Today,  3:11 PM)  Good afternoon,    Patient came into clinic to apply for [employer] for his Entresto.  Patient stated that he would be out of this medication Thursday and would need a temporary supply sent to his pharmacy.  If this medication is too expensive, he might need to change medication until his [employer] application is processed.  Please call patient for more information.    Thank you,  Camille

## 2025-02-11 DIAGNOSIS — E11.9 TYPE 2 DIABETES MELLITUS WITHOUT COMPLICATION, WITHOUT LONG-TERM CURRENT USE OF INSULIN: ICD-10-CM

## 2025-02-11 DIAGNOSIS — M25.551 HIP PAIN, RIGHT: ICD-10-CM

## 2025-02-12 RX ORDER — GLIMEPIRIDE 4 MG/1
4 TABLET ORAL 2 TIMES DAILY WITH MEALS
Qty: 90 TABLET | Refills: 2 | OUTPATIENT
Start: 2025-02-12

## 2025-02-12 RX ORDER — IBUPROFEN 800 MG/1
800 TABLET ORAL EVERY 8 HOURS PRN
Qty: 90 TABLET | Refills: 1 | Status: SHIPPED | OUTPATIENT
Start: 2025-02-12 | End: 2025-11-09

## 2025-02-13 ENCOUNTER — TELEPHONE (OUTPATIENT)
Dept: INTERNAL MEDICINE | Facility: CLINIC | Age: 55
End: 2025-02-13
Payer: COMMERCIAL

## 2025-02-13 NOTE — TELEPHONE ENCOUNTER
----- Message from Terrie sent at 2/13/2025  3:25 PM CST -----  Pt needs refills on Januvia 100, sent to pharmacy here. Pt out completely, has been out for 1 week.

## 2025-02-13 NOTE — TELEPHONE ENCOUNTER
Called pt and informed pt refills on file at [facility]. Pt stated this medication only is to be sent to [facility] due to cost. Advised pt to call [facility] and have them transfer the script to [facility] pharm. Pt voiced understanding.

## 2025-04-11 DIAGNOSIS — Z12.11 SCREENING FOR COLON CANCER: Primary | ICD-10-CM

## 2025-04-11 RX ORDER — POLYETHYLENE GLYCOL 3350, SODIUM SULFATE, SODIUM CHLORIDE, POTASSIUM CHLORIDE, SODIUM ASCORBATE, AND ASCORBIC ACID 7.5-2.691G
KIT ORAL
Qty: 1 KIT | Refills: 0 | Status: SHIPPED | OUTPATIENT
Start: 2025-04-11

## 2025-04-15 ENCOUNTER — RESULTS FOLLOW-UP (OUTPATIENT)
Dept: INTERNAL MEDICINE | Facility: CLINIC | Age: 55
End: 2025-04-15

## 2025-04-15 ENCOUNTER — LAB VISIT (OUTPATIENT)
Dept: LAB | Facility: HOSPITAL | Age: 55
End: 2025-04-15
Payer: COMMERCIAL

## 2025-04-15 DIAGNOSIS — I10 BENIGN ESSENTIAL HYPERTENSION: ICD-10-CM

## 2025-04-15 DIAGNOSIS — Z00.00 WELL ADULT EXAM: ICD-10-CM

## 2025-04-15 DIAGNOSIS — E11.9 TYPE 2 DIABETES MELLITUS WITHOUT COMPLICATION, WITHOUT LONG-TERM CURRENT USE OF INSULIN: ICD-10-CM

## 2025-04-15 LAB
ALBUMIN SERPL-MCNC: 3.7 G/DL (ref 3.5–5)
ALBUMIN/GLOB SERPL: 1.2 RATIO (ref 1.1–2)
ALP SERPL-CCNC: 48 UNIT/L (ref 40–150)
ALT SERPL-CCNC: 22 UNIT/L (ref 0–55)
ANION GAP SERPL CALC-SCNC: 7 MEQ/L
AST SERPL-CCNC: 18 UNIT/L (ref 11–45)
BACTERIA #/AREA URNS AUTO: ABNORMAL /HPF
BASOPHILS # BLD AUTO: 0.06 X10(3)/MCL
BASOPHILS NFR BLD AUTO: 0.7 %
BILIRUB SERPL-MCNC: 0.4 MG/DL
BILIRUB UR QL STRIP.AUTO: NEGATIVE
BUN SERPL-MCNC: 21.3 MG/DL (ref 8.4–25.7)
CALCIUM SERPL-MCNC: 8.9 MG/DL (ref 8.4–10.2)
CAOX CRY UR QL COMP ASSIST: ABNORMAL
CHLORIDE SERPL-SCNC: 109 MMOL/L (ref 98–107)
CHOLEST SERPL-MCNC: 118 MG/DL
CHOLEST/HDLC SERPL: 3 {RATIO} (ref 0–5)
CLARITY UR: CLEAR
CO2 SERPL-SCNC: 24 MMOL/L (ref 22–29)
COLOR UR AUTO: YELLOW
CREAT SERPL-MCNC: 0.89 MG/DL (ref 0.72–1.25)
CREAT/UREA NIT SERPL: 24
EOSINOPHIL # BLD AUTO: 0.44 X10(3)/MCL (ref 0–0.9)
EOSINOPHIL NFR BLD AUTO: 5.1 %
ERYTHROCYTE [DISTWIDTH] IN BLOOD BY AUTOMATED COUNT: 12.4 % (ref 11.5–17)
EST. AVERAGE GLUCOSE BLD GHB EST-MCNC: 116.9 MG/DL
GFR SERPLBLD CREATININE-BSD FMLA CKD-EPI: >60 ML/MIN/1.73/M2
GLOBULIN SER-MCNC: 3.1 GM/DL (ref 2.4–3.5)
GLUCOSE SERPL-MCNC: 247 MG/DL (ref 74–100)
GLUCOSE UR QL STRIP: ABNORMAL
HBA1C MFR BLD: 5.7 %
HCT VFR BLD AUTO: 38.9 % (ref 42–52)
HDLC SERPL-MCNC: 43 MG/DL (ref 35–60)
HGB BLD-MCNC: 13.3 G/DL (ref 14–18)
HGB UR QL STRIP: NEGATIVE
HYALINE CASTS #/AREA URNS LPF: ABNORMAL /LPF
IMM GRANULOCYTES # BLD AUTO: 0.03 X10(3)/MCL (ref 0–0.04)
IMM GRANULOCYTES NFR BLD AUTO: 0.4 %
KETONES UR QL STRIP: NEGATIVE
LDLC SERPL CALC-MCNC: 59 MG/DL (ref 50–140)
LEUKOCYTE ESTERASE UR QL STRIP: NEGATIVE
LYMPHOCYTES # BLD AUTO: 1.39 X10(3)/MCL (ref 0.6–4.6)
LYMPHOCYTES NFR BLD AUTO: 16.2 %
MCH RBC QN AUTO: 32.8 PG (ref 27–31)
MCHC RBC AUTO-ENTMCNC: 34.2 G/DL (ref 33–36)
MCV RBC AUTO: 95.8 FL (ref 80–94)
MONOCYTES # BLD AUTO: 0.74 X10(3)/MCL (ref 0.1–1.3)
MONOCYTES NFR BLD AUTO: 8.6 %
MUCOUS THREADS URNS QL MICRO: ABNORMAL /LPF
NEUTROPHILS # BLD AUTO: 5.91 X10(3)/MCL (ref 2.1–9.2)
NEUTROPHILS NFR BLD AUTO: 69 %
NITRITE UR QL STRIP: NEGATIVE
NRBC BLD AUTO-RTO: 0 %
PH UR STRIP: 5.5 [PH]
PLATELET # BLD AUTO: 252 X10(3)/MCL (ref 130–400)
PMV BLD AUTO: 10.7 FL (ref 7.4–10.4)
POTASSIUM SERPL-SCNC: 3.9 MMOL/L (ref 3.5–5.1)
PROT SERPL-MCNC: 6.8 GM/DL (ref 6.4–8.3)
PROT UR QL STRIP: ABNORMAL
RBC # BLD AUTO: 4.06 X10(6)/MCL (ref 4.7–6.1)
RBC #/AREA URNS AUTO: ABNORMAL /HPF
SODIUM SERPL-SCNC: 140 MMOL/L (ref 136–145)
SP GR UR STRIP.AUTO: 1.03 (ref 1–1.03)
SQUAMOUS #/AREA URNS LPF: ABNORMAL /HPF
T4 FREE SERPL-MCNC: 0.83 NG/DL (ref 0.7–1.48)
TRIGL SERPL-MCNC: 80 MG/DL (ref 34–140)
TSH SERPL-ACNC: 1.07 UIU/ML (ref 0.35–4.94)
UROBILINOGEN UR STRIP-ACNC: NORMAL
VLDLC SERPL CALC-MCNC: 16 MG/DL
WBC # BLD AUTO: 8.57 X10(3)/MCL (ref 4.5–11.5)
WBC #/AREA URNS AUTO: ABNORMAL /HPF

## 2025-04-15 PROCEDURE — 84439 ASSAY OF FREE THYROXINE: CPT

## 2025-04-15 PROCEDURE — 36415 COLL VENOUS BLD VENIPUNCTURE: CPT

## 2025-04-15 PROCEDURE — 80061 LIPID PANEL: CPT

## 2025-04-15 PROCEDURE — 85025 COMPLETE CBC W/AUTO DIFF WBC: CPT

## 2025-04-15 PROCEDURE — 81001 URINALYSIS AUTO W/SCOPE: CPT

## 2025-04-15 PROCEDURE — 80053 COMPREHEN METABOLIC PANEL: CPT

## 2025-04-15 PROCEDURE — 83036 HEMOGLOBIN GLYCOSYLATED A1C: CPT

## 2025-04-15 PROCEDURE — 84443 ASSAY THYROID STIM HORMONE: CPT

## 2025-04-15 NOTE — PROGRESS NOTES
Labs reviewed, will discuss results with patient at their upcoming appointment.     CECILE Castellanos

## 2025-04-16 DIAGNOSIS — E11.9 TYPE 2 DIABETES MELLITUS WITHOUT COMPLICATION, WITHOUT LONG-TERM CURRENT USE OF INSULIN: ICD-10-CM

## 2025-04-16 DIAGNOSIS — I50.42 CHRONIC COMBINED SYSTOLIC AND DIASTOLIC HEART FAILURE: ICD-10-CM

## 2025-04-16 DIAGNOSIS — E78.2 MIXED HYPERLIPIDEMIA: ICD-10-CM

## 2025-04-16 RX ORDER — GLIMEPIRIDE 4 MG/1
4 TABLET ORAL 2 TIMES DAILY WITH MEALS
Qty: 180 TABLET | Refills: 2 | Status: SHIPPED | OUTPATIENT
Start: 2025-04-16 | End: 2026-01-11

## 2025-04-17 RX ORDER — EZETIMIBE 10 MG/1
10 TABLET ORAL DAILY
Qty: 90 TABLET | Refills: 3 | Status: SHIPPED | OUTPATIENT
Start: 2025-04-17 | End: 2026-04-12

## 2025-04-17 RX ORDER — ASPIRIN 81 MG/1
81 TABLET ORAL DAILY
Qty: 90 TABLET | Refills: 3 | Status: SHIPPED | OUTPATIENT
Start: 2025-04-17 | End: 2026-04-17

## 2025-04-22 ENCOUNTER — OFFICE VISIT (OUTPATIENT)
Dept: CARDIOLOGY | Facility: CLINIC | Age: 55
End: 2025-04-22
Payer: COMMERCIAL

## 2025-04-22 VITALS
WEIGHT: 251 LBS | HEART RATE: 69 BPM | TEMPERATURE: 98 F | BODY MASS INDEX: 41.82 KG/M2 | HEIGHT: 65 IN | DIASTOLIC BLOOD PRESSURE: 80 MMHG | RESPIRATION RATE: 20 BRPM | SYSTOLIC BLOOD PRESSURE: 144 MMHG | OXYGEN SATURATION: 94 %

## 2025-04-22 DIAGNOSIS — I10 BENIGN ESSENTIAL HYPERTENSION: Chronic | ICD-10-CM

## 2025-04-22 DIAGNOSIS — E66.9 OBESITY (BMI 30-39.9): ICD-10-CM

## 2025-04-22 DIAGNOSIS — E11.9 TYPE 2 DIABETES MELLITUS WITHOUT COMPLICATION, WITHOUT LONG-TERM CURRENT USE OF INSULIN: ICD-10-CM

## 2025-04-22 DIAGNOSIS — I50.42 CHRONIC COMBINED SYSTOLIC AND DIASTOLIC HEART FAILURE: Primary | ICD-10-CM

## 2025-04-22 PROCEDURE — 93005 ELECTROCARDIOGRAM TRACING: CPT

## 2025-04-22 PROCEDURE — 99215 OFFICE O/P EST HI 40 MIN: CPT | Mod: PBBFAC,25 | Performed by: INTERNAL MEDICINE

## 2025-04-22 RX ORDER — SACUBITRIL AND VALSARTAN 97; 103 MG/1; MG/1
1 TABLET, FILM COATED ORAL 2 TIMES DAILY
Qty: 180 TABLET | Refills: 2 | Status: SHIPPED | OUTPATIENT
Start: 2025-04-22 | End: 2026-01-17

## 2025-04-22 RX ORDER — LOSARTAN POTASSIUM 25 MG/1
25 TABLET ORAL DAILY
Qty: 30 TABLET | Refills: 3 | Status: SHIPPED | OUTPATIENT
Start: 2025-04-22 | End: 2025-08-20

## 2025-04-22 NOTE — PATIENT INSTRUCTIONS
- We will prescribe Losartan 25 mg while you wait for approval for Entresto. If you get approved for Entresto, you can discontinue Losartan and start Entresto 48 hours after stopping losartan. (Do not take losartan and entresto at the same time)    - Let us know if you do not get approved for Entresto as we will need to refill your losartan.    - We will prescribe Jardiance 10 mg to the [facility] in hopes that it is less expensive. This medication may cause increased urination.     - You will need to repeat non fasting labs 2 weeks after starting these medications to make sure your kidney function is stable.You do not need an appointment for labs.    - Obtain blood pressure cuff and measure your blood pressure. Keep and log and bring to next visit. You will need nursing visit for blood pressure check. Make sure to take your medications at least 1-2 hours before this visit and bring ALL medications with you to the visit as well.    - Increase physical activity and weight loss. Avoid Salt intake.

## 2025-04-22 NOTE — PROGRESS NOTES
04/22/2025 5:03 PM    Subjective:     CHIEF COMPLAINT:   Chief Complaint   Patient presents with    f/u denies chest pain or sob since LV question about entres                           HPI:    Mr. Mynor Recio is a 55 y.o. male. The patient has history of CAD s/p PCI x2 (OM and mLCx in 2019), HTN, HFrEF with improved EF (EF 63%, LVEF 30-35% in 2019). The patient presents for 1 year follow up.    Patient is doing well overall with no hospitalizations since last visit. He is a  at [employer]. States he is able to perform work activities without issues. He denies chest pain, SOB, orthopnea, palpitations, and near syncope. He does get intermittent BLE edema. He has been out of his entresto for 4 months due to cost. /80 today.    Past Medical History    Patient Active Problem List   Diagnosis    Arteriosclerosis of coronary artery    Benign essential hypertension    Chronic systolic heart failure    Depressive disorder    Hernia of anterior abdominal wall    Mixed hyperlipidemia    Obesity (BMI 30-39.9)    Neuropathy    Type 2 diabetes mellitus without complication, without long-term current use of insulin    DDD (degenerative disc disease), lumbar       Surgical History    Past Surgical History:   Procedure Laterality Date    TONSILLECTOMY         Social History     Socioeconomic History    Marital status:      Spouse name: Izaiah   Tobacco Use    Smoking status: Never     Passive exposure: Never    Smokeless tobacco: Never   Substance and Sexual Activity    Alcohol use: Never    Drug use: Never    Sexual activity: Yes     Partners: Male     Social Drivers of Health     Financial Resource Strain: Medium Risk (4/4/2024)    Overall Financial Resource Strain (CARDIA)     Difficulty of Paying Living Expenses: Somewhat hard   Food Insecurity: No Food Insecurity (4/4/2024)    Hunger Vital Sign     Worried About Running Out of Food in the Last Year: Never true     Ran Out of Food in the Last Year:  Never true   Transportation Needs: No Transportation Needs (4/4/2024)    PRAPARE - Transportation     Lack of Transportation (Medical): No     Lack of Transportation (Non-Medical): No   Physical Activity: Inactive (4/4/2024)    Exercise Vital Sign     Days of Exercise per Week: 0 days     Minutes of Exercise per Session: 0 min   Stress: No Stress Concern Present (4/4/2024)    [employer] - Occupational Stress Questionnaire     Feeling of Stress : Not at all   Housing Stability: Low Risk  (4/4/2024)    Housing Stability Vital Sign     Unable to Pay for Housing in the Last Year: No     Number of Places Lived in the Last Year: 1     Unstable Housing in the Last Year: No       Family History   Family history unknown: Yes     Review of patient's allergies indicates:  No Known Allergies    Current Medications    Current Outpatient Medications   Medication Instructions    aspirin (ECOTRIN) 81 mg, Oral, Daily    atorvastatin (LIPITOR) 80 mg, Oral, Daily    empagliflozin (JARDIANCE) 10 mg, Oral, Daily    ezetimibe (ZETIA) 10 mg, Oral, Daily    fenofibrate (TRICOR) 145 mg, Oral, Daily    finasteride (PROSCAR) 5 mg, Oral, Daily    FLUoxetine 20 mg, Oral, Daily    glimepiride (AMARYL) 4 mg, Oral, 2 times daily with meals    ibuprofen (ADVIL,MOTRIN) 800 mg, Oral, Every 8 hours PRN    JANUVIA 100 mg, Oral, Daily    losartan (COZAAR) 25 mg, Oral, Daily    metFORMIN (GLUCOPHAGE) 1,000 mg, Oral, 2 times daily with meals    metoprolol succinate (TOPROL-XL) 50 mg, Oral, Daily    polyethylene glycol (MOVIPREP) 100-7.5-2.691 gram solution Take as directed prior to colonoscopy    sacubitriL-valsartan (ENTRESTO)  mg per tablet 1 tablet, Oral, 2 times daily         ROS:   refer to HPI     Objective:     BP Readings from Last 3 Encounters:   04/22/25 (!) 144/80   10/15/24 118/62   09/10/24 130/79        Pulse Readings from Last 3 Encounters:   04/22/25 69   10/15/24 71   09/10/24 74        Temp Readings  "from Last 3 Encounters:   04/22/25 98.4 °F (36.9 °C) (Oral)   10/15/24 98.3 °F (36.8 °C) (Oral)   09/10/24 97.9 °F (36.6 °C) (Oral)       Wt Readings from Last 3 Encounters:   04/22/25 113.9 kg (251 lb)   10/15/24 105.8 kg (233 lb 4.8 oz)   09/10/24 106.8 kg (235 lb 8 oz)         PE:  Blood pressure (!) 144/80, pulse 69, temperature 98.4 °F (36.9 °C), temperature source Oral, resp. rate 20, height 5' 5" (1.651 m), weight 113.9 kg (251 lb), SpO2 (!) 94%.   GENERAL:  Ambulates, Central obesity  CONSTITUTIONAL:  No acute distress.  Not ill appearing.  NECK:  No carotid bruit.  CARDIOVASCULAR:  Normal rate.  Regular rhythm.  No murmur.  PULMONARY:  No respiratory distress.  No wheezing.  No crackles.  ABDOMINAL:  No distention.  No tenderness.   Obese  MUSCULOSKELETAL:  No deformity.  Trace pitting edema  SKIN:  No bruising.  No rash.  NEUROLOGICAL:  Oriented x 3.  No weakness.                                                                                                                                                                                                                                                                                                                                                                                                                                                                               CARDIAC TESTING:  Echocardiogram  Results for orders placed during the hospital encounter of 08/10/23    Echo    Interpretation Summary    Left Ventricle: There is normal systolic function. Biplane (2D) method of discs ejection fraction is 63%.    Left Atrium: Left atrium is mildly dilated.    Right Ventricle: Normal right ventricular cavity size. Systolic function is normal.    Aortic Valve: The aortic valve is a trileaflet valve.      Stress Test  No results found for this or any previous visit.     Coronary Angiogram  Results for orders placed in visit on 07/03/19    CATH LAB PROCEDURE   " "  Holter Monitor  No cardiac monitor results found for the past 12 months    Last BMP BMP  Lab Results   Component Value Date     04/15/2025    K 3.9 04/15/2025     (H) 04/15/2025    CO2 24 04/15/2025    BUN 21.3 04/15/2025    CREATININE 0.89 04/15/2025    CALCIUM 8.9 04/15/2025    EGFRNORACEVR >60 04/15/2025      Creatinine    Lab Results   Component Value Date    CREATININE 0.89 04/15/2025    CREATININE 0.96 09/10/2024    CREATININE 0.85 04/25/2024     Magnesium No components found for: "MAG"  Last CBC     Lab Results   Component Value Date    WBC 8.57 04/15/2025    HGB 13.3 (L) 04/15/2025    HCT 38.9 (L) 04/15/2025    MCV 95.8 (H) 04/15/2025     04/15/2025           BNP  No results found for: "BNP"  Last lipids    Lab Results   Component Value Date    CHOL 118 04/15/2025    CHOL 126 09/10/2024    CHOL 132 05/15/2023    HDL 43 04/15/2025    HDL 49 09/10/2024    HDL 35 05/15/2023    LDL 59.00 04/15/2025    LDL 59.00 09/10/2024    LDL 77.00 05/15/2023    TRIG 80 04/15/2025    TRIG 92 09/10/2024    TRIG 100 05/15/2023    TOTALCHOLEST 3 04/15/2025    TOTALCHOLEST 3 09/10/2024    TOTALCHOLEST 4 05/15/2023      LFT     Lab Results   Component Value Date    ALT 22 04/15/2025    ALT 25 11/29/2022    ALT 30 08/29/2022    AST 18 04/15/2025    AST 17 11/29/2022    AST 23 08/29/2022     Troponin    Lab Results   Component Value Date    TROPONINI <0.015 10/06/2019    TROPONINI 1.420 (AA) 09/26/2019    TROPONINI 0.023 02/28/2019       Assessment:     1. Chronic combined systolic and diastolic heart failure  - IN OFFICE EKG 12-LEAD (to Muse)  - sacubitriL-valsartan (ENTRESTO)  mg per tablet; Take 1 tablet by mouth 2 (two) times daily.  Dispense: 180 tablet; Refill: 2  - Basic Metabolic Panel; Future    2. Benign essential hypertension    3. Type 2 diabetes mellitus without complication, without long-term current use of insulin    4. Obesity (BMI 30-39.9)    10 Year Cardiovascular Risk:  The ASCVD Risk " score (Ervin RICH, et al., 2019) failed to calculate for the following reasons:    The valid total cholesterol range is 130 to 320 mg/dL  BMI:  Body mass index is 41.77 kg/m².  Patient has super morbid obesity (BMI >39.9)  Tobacco:  none      Alcohol:  none  Substance abuse:  none     Last PCP visit:  10/15/2024    Plan:     MEDICATIONS:  refill cardiac medications   LDL 59, continue zetia, fenofibrate, lipitor  Continue aspirin  Continue toprol XL 50 mg  Entresto is cost prohibitive, has been off of this for 4 months. Denied medication assistance. Now filling out form for private insurance $10 copay approval. In the meantime, will Rx losartan while pending approval. Instructed to call us if approved or not approved so that we may adjust refills, etc.  Start Jardiance if able to afford  Given BP log to bring to nursing visit.  BMP in 2 weeks after starting losartan and jardiance    DIET:  Avoid processed foods and drinks, sugar, salt, fried/greasy foods, and fast foods      WEIGHT:      Wt Readings from Last 3 Encounters:   04/22/25 113.9 kg (251 lb)   10/15/24 105.8 kg (233 lb 4.8 oz)   09/10/24 106.8 kg (235 lb 8 oz)     EXERCISE:  continue active lifestyle    FOLLOW UP:  4 months    Blanca Jenkins MD  Cardiology Fellow    Future Appointments   Date Time Provider Department Center   5/12/2025  1:30 PM NURSE, [facility] CARDIOLOGY [facility] VERONICA Paniagua   5/26/2025  1:00 PM Ria Galeas FNP [facility] ANTWON P[place]agua   8/22/2025 10:30 AM Raghavendra Delgado MD [facility] VERONICA [place]

## 2025-04-24 LAB
OHS QRS DURATION: 92 MS
OHS QTC CALCULATION: 401 MS

## 2025-05-12 ENCOUNTER — LAB VISIT (OUTPATIENT)
Dept: LAB | Facility: HOSPITAL | Age: 55
End: 2025-05-12
Attending: STUDENT IN AN ORGANIZED HEALTH CARE EDUCATION/TRAINING PROGRAM
Payer: COMMERCIAL

## 2025-05-12 ENCOUNTER — CLINICAL SUPPORT (OUTPATIENT)
Dept: CARDIOLOGY | Facility: CLINIC | Age: 55
End: 2025-05-12
Payer: COMMERCIAL

## 2025-05-12 VITALS
OXYGEN SATURATION: 97 % | TEMPERATURE: 98 F | HEART RATE: 69 BPM | WEIGHT: 236.63 LBS | BODY MASS INDEX: 39.42 KG/M2 | DIASTOLIC BLOOD PRESSURE: 72 MMHG | SYSTOLIC BLOOD PRESSURE: 119 MMHG | RESPIRATION RATE: 20 BRPM | HEIGHT: 65 IN

## 2025-05-12 DIAGNOSIS — I50.42 CHRONIC COMBINED SYSTOLIC AND DIASTOLIC HEART FAILURE: ICD-10-CM

## 2025-05-12 DIAGNOSIS — I10 BENIGN ESSENTIAL HYPERTENSION: Primary | ICD-10-CM

## 2025-05-12 LAB
ANION GAP SERPL CALC-SCNC: 8 MEQ/L
BUN SERPL-MCNC: 24.8 MG/DL (ref 8.4–25.7)
CALCIUM SERPL-MCNC: 9.5 MG/DL (ref 8.4–10.2)
CHLORIDE SERPL-SCNC: 108 MMOL/L (ref 98–107)
CO2 SERPL-SCNC: 25 MMOL/L (ref 22–29)
CREAT SERPL-MCNC: 0.87 MG/DL (ref 0.72–1.25)
CREAT/UREA NIT SERPL: 29
GFR SERPLBLD CREATININE-BSD FMLA CKD-EPI: >60 ML/MIN/1.73/M2
GLUCOSE SERPL-MCNC: 135 MG/DL (ref 74–100)
POTASSIUM SERPL-SCNC: 3.7 MMOL/L (ref 3.5–5.1)
SODIUM SERPL-SCNC: 141 MMOL/L (ref 136–145)

## 2025-05-12 PROCEDURE — 99215 OFFICE O/P EST HI 40 MIN: CPT | Mod: PBBFAC

## 2025-05-12 PROCEDURE — 36415 COLL VENOUS BLD VENIPUNCTURE: CPT

## 2025-05-12 PROCEDURE — 80048 BASIC METABOLIC PNL TOTAL CA: CPT

## 2025-05-12 RX ORDER — ACETAMINOPHEN 500 MG
1 TABLET ORAL
Qty: 1 EACH | Refills: 0 | Status: SHIPPED | OUTPATIENT
Start: 2025-05-12

## 2025-05-12 NOTE — TELEPHONE ENCOUNTER
HERE FOR B/P CHECK 119/72 HR--69   STATES FEELS FINE  STARTED TAKING ENTRESTO 4/24 NEVER TOOK   THE LOSARTAN NEEDS TO GO TO LAB FOR BLOOD DRAW TODAY  WILL GET A SCRIPT FOR B/P CUFF AND ENCOURAGED TO START  TAKING B/P DAILY TO CALL IF B/P GOES HIGHER THAN 135/85  WILL CALL YOU ABOUT LAB RESULTS IF ABNORMAL.  BP CUFF ORDER NEEDED

## 2025-05-12 NOTE — PROGRESS NOTES
HERE FOR B/P CHECK 119/72 HR--69   STATES FEELS FINE  STARTED TAKING ENTRESTO 4/24 NEVER TOOK   THE LOSARTAN NEEDS TO GO TO LAB FOR BLOOD DRAW TODAY  WILL GET A SCRIPT FOR B/P CUFF AND ENCOURAGED TO START  TAKING B/P DAILY TO CALL IF B/P GOES HIGHER THAN 135/85  WILL CALL YOU ABOUT LAB RESULTS IF ABNORMAL

## 2025-05-14 ENCOUNTER — ANESTHESIA EVENT (OUTPATIENT)
Dept: ENDOSCOPY | Facility: HOSPITAL | Age: 55
End: 2025-05-14
Payer: COMMERCIAL

## 2025-05-14 NOTE — ANESTHESIA PREPROCEDURE EVALUATION
"                                                                                                             05/14/2025  Mynor Recio is a 55 y.o., male for [facility]    Vitals:    05/19/25 1017 05/19/25 1035   BP: 121/72 121/72   BP Location: Right arm    Patient Position: Lying    Pulse: 83 83   Resp: 18    Temp: 36.8 °C (98.2 °F)    TempSrc: Oral    SpO2: 97%    Weight: 106.4 kg (234 lb 9.6 oz)    Height: 5' 5" (1.651 m)        history of CAD s/p PCI x2 (OM and mLCx in 2019), HTN, HFrEF with improved EF (EF 63%AUG 2023, LVEF 30-35% in 2019) ; Noted SaO2 94% RA  @ cards visit 4/2025     LD BB 1035 DOS  LD Entresto 5/18/25      ECHO AUG 2023    Left Ventricle: There is normal systolic function. Biplane (2D) method of discs ejection fraction is 63%.    Left Atrium: Left atrium is mildly dilated.    Right Ventricle: Normal right ventricular cavity size. Systolic function is normal.    Aortic Valve: The aortic valve is a trileaflet valve.       Active Ambulatory Problems     Diagnosis Date Noted    Arteriosclerosis of coronary artery 09/01/2022    Benign essential hypertension 09/01/2022    Chronic systolic heart failure 09/01/2022    Depressive disorder 09/01/2022    Hernia of anterior abdominal wall 09/01/2022    Mixed hyperlipidemia 09/01/2022    Obesity (BMI 30-39.9) 09/01/2022    Neuropathy 09/01/2022    Type 2 diabetes mellitus without complication, without long-term current use of insulin 09/01/2022    DDD (degenerative disc disease), lumbar 02/22/2024     Resolved Ambulatory Problems     Diagnosis Date Noted    Diabetes mellitus 09/01/2022    Abrasion of toe of left foot 09/05/2022    Onychomycosis of multiple toenails with type 2 diabetes mellitus 09/19/2022    Tinea unguium 09/19/2022     Past Medical History:   Diagnosis Date    Diabetes mellitus, type 2     Hyperlipidemia     Hypertension        Past Surgical History:   Procedure Laterality Date    TONSILLECTOMY         Lab Results "   Component Value Date    WBC 8.57 04/15/2025    HGB 13.3 (L) 04/15/2025    HCT 38.9 (L) 04/15/2025     04/15/2025    CHOL 118 04/15/2025    TRIG 80 04/15/2025    HDL 43 04/15/2025    ALT 22 04/15/2025    AST 18 04/15/2025     05/12/2025    K 3.7 05/12/2025     (H) 05/12/2025    CREATININE 0.87 05/12/2025    BUN 24.8 05/12/2025    CO2 25 05/12/2025    TSH 1.071 04/15/2025    PSA 0.11 09/10/2024    INR 0.98 09/25/2019    HGBA1C 5.7 04/15/2025           Pre-op Assessment    I have reviewed the Patient Summary Reports.     I have reviewed the Nursing Notes. I have reviewed the NPO Status.   I have reviewed the Medications.     Review of Systems  Anesthesia Hx:  No problems with previous Anesthesia   History of prior surgery of interest to airway management or planning:          Denies Family Hx of Anesthesia complications.    Denies Personal Hx of Anesthesia complications.                    Hematology/Oncology:  Hematology Normal   Oncology Normal                                   EENT/Dental:  EENT/Dental Normal           Cardiovascular:  Cardiovascular Normal                                              Pulmonary:  Pulmonary Normal                       Renal/:  Renal/ Normal                 Hepatic/GI:  Hepatic/GI Normal                    Musculoskeletal:  Musculoskeletal Normal                Neurological:  Neurology Normal                                      Endocrine:  Endocrine Normal            Dermatological:  Skin Normal    Psych:  Psychiatric Normal                  Physical Exam  General: Well nourished, Cooperative, Alert and Oriented    Airway:  Mallampati: I / I  Mouth Opening: Normal  TM Distance: Normal  Tongue: Normal  Neck ROM: Normal ROM    Dental:  Intact    Anesthesia Plan  Type of Anesthesia, risks & benefits discussed:    Anesthesia Type: Gen Natural Airway  Intra-op Monitoring Plan: Standard ASA Monitors  Post Op Pain Control Plan: IV/PO Opioids PRN  (medical reason  for not using multimodal pain management)  Induction:  IV  Informed Consent: Informed consent signed with the Patient and all parties understand the risks and agree with anesthesia plan.  All questions answered. Patient consented to blood products? No  ASA Score: 3  Day of Surgery Review of History & Physical: H&P Update referred to the surgeon/provider.    Ready For Surgery From Anesthesia Perspective.   .

## 2025-05-19 ENCOUNTER — ANESTHESIA (OUTPATIENT)
Dept: ENDOSCOPY | Facility: HOSPITAL | Age: 55
End: 2025-05-19
Payer: COMMERCIAL

## 2025-05-19 ENCOUNTER — HOSPITAL ENCOUNTER (OUTPATIENT)
Facility: HOSPITAL | Age: 55
Discharge: HOME | End: 2025-05-19
Attending: INTERNAL MEDICINE | Admitting: INTERNAL MEDICINE
Payer: COMMERCIAL

## 2025-05-19 DIAGNOSIS — R19.5 POSITIVE FIT (FECAL IMMUNOCHEMICAL TEST): ICD-10-CM

## 2025-05-19 LAB — POCT GLUCOSE: 92 MG/DL (ref 70–110)

## 2025-05-19 PROCEDURE — 63600175 PHARM REV CODE 636 W HCPCS: Performed by: NURSE ANESTHETIST, CERTIFIED REGISTERED

## 2025-05-19 PROCEDURE — 25000003 PHARM REV CODE 250: Performed by: SPECIALIST

## 2025-05-19 PROCEDURE — 25000003 PHARM REV CODE 250: Performed by: INTERNAL MEDICINE

## 2025-05-19 PROCEDURE — 27201423 OPTIME MED/SURG SUP & DEVICES STERILE SUPPLY: Performed by: INTERNAL MEDICINE

## 2025-05-19 PROCEDURE — 37000008 HC ANESTHESIA 1ST 15 MINUTES: Performed by: INTERNAL MEDICINE

## 2025-05-19 PROCEDURE — 37000009 HC ANESTHESIA EA ADD 15 MINS: Performed by: INTERNAL MEDICINE

## 2025-05-19 PROCEDURE — 63600175 PHARM REV CODE 636 W HCPCS: Performed by: SPECIALIST

## 2025-05-19 PROCEDURE — 45385 COLONOSCOPY W/LESION REMOVAL: CPT | Performed by: INTERNAL MEDICINE

## 2025-05-19 PROCEDURE — 88305 TISSUE EXAM BY PATHOLOGIST: CPT | Mod: TC | Performed by: INTERNAL MEDICINE

## 2025-05-19 RX ORDER — DEXTROMETHORPHAN/PSEUDOEPHED 2.5-7.5/.8
DROPS ORAL
Status: COMPLETED | OUTPATIENT
Start: 2025-05-19 | End: 2025-05-19

## 2025-05-19 RX ORDER — LIDOCAINE HYDROCHLORIDE 20 MG/ML
INJECTION, SOLUTION EPIDURAL; INFILTRATION; INTRACAUDAL; PERINEURAL
Status: DISCONTINUED | OUTPATIENT
Start: 2025-05-19 | End: 2025-05-19

## 2025-05-19 RX ORDER — SODIUM CHLORIDE, SODIUM LACTATE, POTASSIUM CHLORIDE, CALCIUM CHLORIDE 600; 310; 30; 20 MG/100ML; MG/100ML; MG/100ML; MG/100ML
INJECTION, SOLUTION INTRAVENOUS CONTINUOUS
Status: DISCONTINUED | OUTPATIENT
Start: 2025-05-19 | End: 2025-05-19 | Stop reason: HOSPADM

## 2025-05-19 RX ORDER — METOPROLOL SUCCINATE 50 MG/1
50 TABLET, EXTENDED RELEASE ORAL ONCE
Status: COMPLETED | OUTPATIENT
Start: 2025-05-19 | End: 2025-05-19

## 2025-05-19 RX ORDER — PROPOFOL 10 MG/ML
VIAL (ML) INTRAVENOUS
Status: DISCONTINUED | OUTPATIENT
Start: 2025-05-19 | End: 2025-05-19

## 2025-05-19 RX ADMIN — SODIUM CHLORIDE, POTASSIUM CHLORIDE, SODIUM LACTATE AND CALCIUM CHLORIDE: 600; 310; 30; 20 INJECTION, SOLUTION INTRAVENOUS at 01:05

## 2025-05-19 RX ADMIN — METOPROLOL SUCCINATE ER TABLETS 50 MG: 50 TABLET, FILM COATED, EXTENDED RELEASE ORAL at 10:05

## 2025-05-19 RX ADMIN — PROPOFOL 50 MG: 10 INJECTION, EMULSION INTRAVENOUS at 01:05

## 2025-05-19 RX ADMIN — PROPOFOL 100 MG: 10 INJECTION, EMULSION INTRAVENOUS at 01:05

## 2025-05-19 RX ADMIN — SODIUM CHLORIDE, POTASSIUM CHLORIDE, SODIUM LACTATE AND CALCIUM CHLORIDE: 600; 310; 30; 20 INJECTION, SOLUTION INTRAVENOUS at 10:05

## 2025-05-19 RX ADMIN — LIDOCAINE HYDROCHLORIDE 60 MG: 20 INJECTION, SOLUTION EPIDURAL; INFILTRATION; INTRACAUDAL; PERINEURAL at 01:05

## 2025-05-19 RX ADMIN — PROPOFOL 40 MG: 10 INJECTION, EMULSION INTRAVENOUS at 01:05

## 2025-05-19 RX ADMIN — PROPOFOL 30 MG: 10 INJECTION, EMULSION INTRAVENOUS at 01:05

## 2025-05-19 NOTE — PLAN OF CARE
Patient awake in bed. States drowsy but answers all questions appropriately. Tolerating sips of drink. Friend at bedside. Will monitor.

## 2025-05-19 NOTE — H&P
History and Physical    Patient Name: Mynor Recio  YOB: 1970  Date: 05/19/2025 8:28 AM  Date of Admission: (Not on file)  HD#0  POD#* No surgery found *    PRESENTING HISTORY   Chief Complaint/Reason for Admission: <principal problem not specified>    History of Present Illness:  55 y.o. male with PMH DM, HTN, CHF (EF 30-35%) s/p PCI w/ stent x2 2019, obesity, tobacco abuse,  presenting today for screening colonoscopy for positive FIT test    Denies any recent fever, chills, nausea, vomiting, chest pain, abdominal pain, bleeding per rectum.  Abdominal symptoms - none  Family history - none  Prior colonoscopy - none  Anticoagulation - none    Review of Systems:  12 point ROS negative except as stated in HPI    PAST HISTORY:   Past medical history:  Past Medical History:   Diagnosis Date    Arteriosclerosis of coronary artery 9/1/2022    Benign essential hypertension 9/1/2022    Chronic systolic heart failure 9/1/2022    Depressive disorder 9/1/2022    Diabetes mellitus, type 2     Hernia of anterior abdominal wall 9/1/2022    Hyperlipidemia     Hypertension     Neuropathy 9/1/2022    Onychomycosis of multiple toenails with type 2 diabetes mellitus 9/19/2022    Tinea unguium 9/19/2022       Past surgical history:  Past Surgical History:   Procedure Laterality Date    TONSILLECTOMY         Family history:  Family History   Family history unknown: Yes       Social history:  Social History     Socioeconomic History    Marital status:      Spouse name: Izaiah   Tobacco Use    Smoking status: Never     Passive exposure: Never    Smokeless tobacco: Never   Substance and Sexual Activity    Alcohol use: Never    Drug use: Never    Sexual activity: Yes     Partners: Male     Social Drivers of Health     Financial Resource Strain: Medium Risk (4/4/2024)    Overall Financial Resource Strain (CARDIA)     Difficulty of Paying Living Expenses: Somewhat hard   Food Insecurity: No Food Insecurity  (4/4/2024)    Hunger Vital Sign     Worried About Running Out of Food in the Last Year: Never true     Ran Out of Food in the Last Year: Never true   Transportation Needs: No Transportation Needs (4/4/2024)    PRAPARE - Transportation     Lack of Transportation (Medical): No     Lack of Transportation (Non-Medical): No   Physical Activity: Inactive (4/4/2024)    Exercise Vital Sign     Days of Exercise per Week: 0 days     Minutes of Exercise per Session: 0 min   Stress: No Stress Concern Present (4/4/2024)    [employer] - Occupational Stress Questionnaire     Feeling of Stress : Not at all   Housing Stability: Low Risk  (4/4/2024)    Housing Stability Vital Sign     Unable to Pay for Housing in the Last Year: No     Number of Places Lived in the Last Year: 1     Unstable Housing in the Last Year: No     Tobacco Use History[1]   Social History     Substance and Sexual Activity   Alcohol Use Never        MEDICATIONS & ALLERGIES:     No current facility-administered medications on file prior to encounter.     Current Outpatient Medications on File Prior to Encounter   Medication Sig    atorvastatin (LIPITOR) 80 MG tablet Take 1 tablet (80 mg total) by mouth once daily.    fenofibrate (TRICOR) 145 MG tablet Take 1 tablet (145 mg total) by mouth once daily.    finasteride (PROSCAR) 5 mg tablet Take 1 tablet (5 mg total) by mouth once daily.    FLUoxetine 20 MG capsule Take 1 capsule (20 mg total) by mouth once daily.    JANUVIA 100 mg Tab Take 1 tablet (100 mg total) by mouth once daily.    metFORMIN (GLUCOPHAGE) 1000 MG tablet Take 1 tablet (1,000 mg total) by mouth 2 (two) times daily with meals.    metoprolol succinate (TOPROL-XL) 50 MG 24 hr tablet Take 1 tablet (50 mg total) by mouth once daily.       Allergies: Review of patient's allergies indicates:  No Known Allergies    Scheduled Meds:    Continuous Infusions:    PRN Meds:    OBJECTIVE:   Vital Signs:  VITAL SIGNS: 24 HR MIN & MAX  "LAST   No data recorded      No data recorded       No data recorded       No data recorded       No data recorded         HT:    WT:    BMI:       Intake/output:  Intake/Output - Last 3 Shifts       None          No intake or output data in the 24 hours ending 05/19/25 0828      Physical Exam:  General: Well developed, well nourished, no acute distress  HEENT: Normocephalic, atraumatic, PERRL  CV: RR  Resp: NWOB  GI:  Abdomen soft, non-tender, non-distended, no guarding, no rebound, normoactive bowel sounds, no masses   :  Deferred  MSK: No muscle atrophy, cyanosis, peripheral edema, moving all extremities spontaneously  Skin/wounds:  No rashes, ulcers, erythema  Neuro:  CNII-XII grossly intact, alert and oriented to person, place, and time    Labs:  Troponin:  No results for input(s): "TROPONINI" in the last 72 hours.  CBC:  No results for input(s): "WBC", "RBC", "HGB", "HCT", "PLT", "MCV", "MCH", "MCHC" in the last 72 hours.  CMP:  No results for input(s): "GLU", "CALCIUM", "ALBUMIN", "PROT", "NA", "K", "CO2", "CL", "BUN", "CREATININE", "ALKPHOS", "ALT", "AST", "BILITOT" in the last 72 hours.  Lactic Acid:  No results for input(s): "LACTATE" in the last 72 hours.  ETOH:  No results for input(s): "ETHANOL" in the last 72 hours.   Urine Drug Screen:  No results for input(s): "COCAINE", "OPIATE", "BARBITURATE", "AMPHETAMINE", "FENTANYL", "CANNABINOIDS", "MDMA" in the last 72 hours.    Invalid input(s): "BENZODIAZEPINE", "PHENCYCLIDINE"   ABG:  No results for input(s): "PH", "PO2", "PCO2", "HCO3", "BE" in the last 168 hours.     Diagnostic Results:  No orders to display       ASSESSMENT & PLAN:    Mynor Recio is a 55 y.o. male presenting today for screening colonoscopy    Consent signed at bedside  Endo suite today for screening colonoscopy    Jonnathan Aviles MD  [employer] General Surgery, PGY-2  05/19/2025         [1]   Social History  Tobacco Use   Smoking Status Never    Passive exposure: Never   Smokeless Tobacco " Never

## 2025-05-19 NOTE — TRANSFER OF CARE
"Anesthesia Transfer of Care Note    Patient: Mynor Recio    Procedure(s) Performed: Procedure(s) (LRB):  COLONOSCOPY, WITH POLYPECTOMY USING SNARE (N/A)    Patient location: PACU    Anesthesia Type: MAC    Transport from OR: Transported from OR on room air with adequate spontaneous ventilation    Post pain: adequate analgesia    Post assessment: no apparent anesthetic complications    Post vital signs: stable    Level of consciousness: sedated    Nausea/Vomiting: no nausea/vomiting    Complications: none    Transfer of care protocol was followed      Last vitals: Visit Vitals  /72   Pulse 83   Temp 36.8 °C (98.2 °F) (Oral)   Resp 18   Ht 5' 5" (1.651 m)   Wt 106.4 kg (234 lb 9.6 oz)   SpO2 97%   BMI 39.04 kg/m²     "

## 2025-05-19 NOTE — PROVATION PATIENT INSTRUCTIONS
Discharge Summary/Instructions after an Endoscopic Procedure  Patient Name: Mynor Recio  Patient MRN: [id number]  Patient YOB: 1970  Monday, May 19, 2025  Emerald Paz MD  Dear patient,  As a result of recent federal legislation (The Federal Cures Act), you may   receive lab or pathology results from your procedure in your [facility]   account before your physician is able to contact you. Your physician or   their representative will relay the results to you with their   recommendations at their soonest availability.  Thank you,  RESTRICTIONS:  During your procedure today, you received medications for sedation.  These   medications may affect your judgment, balance and coordination.  Therefore,   for 24 hours, you have the following restrictions:   - DO NOT drive a car, operate machinery, make legal/financial decisions,   sign important papers or drink alcohol.    ACTIVITY:  Today: no heavy lifting, straining or running due to procedural   sedation/anesthesia.  The following day: return to full activity including work.  DIET:  Eat and drink normally unless instructed otherwise.     TREATMENT FOR COMMON SIDE EFFECTS:  - Mild abdominal pain, nausea, belching, bloating or excessive gas:  rest,   eat lightly and use a heating pad.  - Sore Throat: treat with throat lozenges and/or gargle with warm salt   water.  - Because air was used during the procedure, expelling large amounts of air   from your rectum or belching is normal.  - If a bowel prep was taken, you may not have a bowel movement for 1-3 days.    This is normal.  SYMPTOMS TO WATCH FOR AND REPORT TO YOUR PHYSICIAN:  1. Abdominal pain or bloating, other than gas cramps.  2. Chest pain.  3. Back pain.  4. Signs of infection such as: chills or fever occurring within 24 hours   after the procedure.  5. Rectal bleeding, which would show as bright red, maroon, or black stools.   (A tablespoon of blood from the rectum is not serious, especially  if   hemorrhoids are present.)  6. Vomiting.  7. Weakness or dizziness.  GO DIRECTLY TO THE NEAREST EMERGENCY ROOM IF YOU HAVE ANY OF THE FOLLOWING:      Difficulty breathing              Chills and/or fever over 101 F   Persistent vomiting and/or vomiting blood   Severe abdominal pain   Severe chest pain   Black, tarry stools   Bleeding- more than one tablespoon   Any other symptom or condition that you feel may need urgent attention  Your doctor recommends these additional instructions:  If any biopsies were taken, your doctors clinic will contact you in 1 to 2   weeks with any results.  Recommendations:  - Patient has a contact number available for emergencies.  The signs and   symptoms of potential delayed complications were discussed with the   patient.  Return to normal activities tomorrow.  Written discharge   instructions were provided to the patient.   - Discharge patient to home.   - Resume previous diet.   - Continue present medications.   - Await pathology results.   - Repeat colonoscopy in 3 years because the bowel preparation was   suboptimal.  Impressions:  - Preparation of the colon was fair.   - The examined portion of the ileum was normal.   - One 2 mm polyp in the transverse colon, removed with a cold snare.    Resected and retrieved.   - One 2 mm polyp in the rectum, removed with a cold snare.  Resected and   retrieved.   - Internal hemorrhoids.  For questions, problems or results please call your physician - Emerald Paz MD at Work:  [phone], Work:  [phone].  [facility] , EMERGENCY ROOM PHONE NUMBER: [phone]  IF A COMPLICATION OR EMERGENCY SITUATION ARISES AND YOU ARE UNABLE TO REACH   YOUR PHYSICIAN - GO DIRECTLY TO THE EMERGENCY ROOM.  Emerald Paz MD  5/19/2025 2:18:22 PM  This report has been verified and signed electronically.  Dear patient,  As a result of recent federal legislation (The Federal Cures Act), you may   receive lab or  pathology results from your procedure in your [facility]   account before your physician is able to contact you. Your physician or   their representative will relay the results to you with their   recommendations at their soonest availability.  Thank you,  PROVATION

## 2025-05-20 VITALS
RESPIRATION RATE: 20 BRPM | WEIGHT: 234.63 LBS | DIASTOLIC BLOOD PRESSURE: 78 MMHG | BODY MASS INDEX: 39.09 KG/M2 | TEMPERATURE: 98 F | HEART RATE: 76 BPM | SYSTOLIC BLOOD PRESSURE: 108 MMHG | OXYGEN SATURATION: 96 % | HEIGHT: 65 IN

## 2025-05-20 NOTE — ANESTHESIA POSTPROCEDURE EVALUATION
Anesthesia Post Evaluation    Patient: Mynor Recio    Procedure(s) Performed: Procedure(s) (LRB):  COLONOSCOPY, WITH POLYPECTOMY USING SNARE (N/A)    Final Anesthesia Type: general      Patient location during evaluation: PACU  Patient participation: Yes- Able to Participate  Level of consciousness: awake and responds to stimulation  Post-procedure vital signs: reviewed and stable  Pain management: adequate  Airway patency: patent    PONV status at discharge: No PONV  Anesthetic complications: no      Cardiovascular status: blood pressure returned to baseline  Respiratory status: unassisted  Hydration status: euvolemic  Follow-up not needed.            Vitals Value Taken Time   /78 05/19/25 14:30   Temp 36.6 05/20/25 08:43   Pulse 76 05/19/25 14:30   Resp 20 05/19/25 14:30   SpO2 96 % 05/19/25 14:30         No case tracking events are documented in the log.      Pain/Bebo Score: Bebo Score: 8 (5/19/2025  2:07 PM)

## 2025-05-21 ENCOUNTER — RESULTS FOLLOW-UP (OUTPATIENT)
Dept: GASTROENTEROLOGY | Facility: CLINIC | Age: 55
End: 2025-05-21

## 2025-05-21 DIAGNOSIS — I50.42 CHRONIC COMBINED SYSTOLIC AND DIASTOLIC HEART FAILURE: ICD-10-CM

## 2025-05-21 DIAGNOSIS — M25.551 HIP PAIN, RIGHT: ICD-10-CM

## 2025-05-21 LAB
ESTROGEN SERPL-MCNC: NORMAL PG/ML
INSULIN SERPL-ACNC: NORMAL U[IU]/ML
LAB AP CLINICAL INFORMATION: NORMAL
LAB AP GROSS DESCRIPTION: NORMAL
LAB AP REPORT FOOTNOTES: NORMAL

## 2025-05-21 RX ORDER — FINASTERIDE 5 MG/1
5 TABLET, FILM COATED ORAL DAILY
Qty: 90 TABLET | Refills: 2 | Status: SHIPPED | OUTPATIENT
Start: 2025-05-21 | End: 2026-02-15

## 2025-05-21 RX ORDER — IBUPROFEN 800 MG/1
800 TABLET, FILM COATED ORAL EVERY 8 HOURS PRN
Qty: 90 TABLET | Refills: 1 | Status: CANCELLED | OUTPATIENT
Start: 2025-05-21 | End: 2026-02-15

## 2025-05-22 DIAGNOSIS — M25.551 HIP PAIN, RIGHT: ICD-10-CM

## 2025-05-22 RX ORDER — IBUPROFEN 800 MG/1
800 TABLET, FILM COATED ORAL EVERY 8 HOURS PRN
Qty: 90 TABLET | Refills: 1 | Status: SHIPPED | OUTPATIENT
Start: 2025-05-22 | End: 2026-02-16

## 2025-05-26 ENCOUNTER — OFFICE VISIT (OUTPATIENT)
Dept: INTERNAL MEDICINE | Facility: CLINIC | Age: 55
End: 2025-05-26
Payer: COMMERCIAL

## 2025-05-26 VITALS
HEART RATE: 76 BPM | SYSTOLIC BLOOD PRESSURE: 109 MMHG | DIASTOLIC BLOOD PRESSURE: 63 MMHG | HEIGHT: 65 IN | TEMPERATURE: 98 F | BODY MASS INDEX: 39.27 KG/M2 | RESPIRATION RATE: 16 BRPM | OXYGEN SATURATION: 96 % | WEIGHT: 235.69 LBS

## 2025-05-26 DIAGNOSIS — M25.551 HIP PAIN, RIGHT: ICD-10-CM

## 2025-05-26 DIAGNOSIS — I50.42 CHRONIC COMBINED SYSTOLIC AND DIASTOLIC HEART FAILURE: ICD-10-CM

## 2025-05-26 DIAGNOSIS — E78.2 MIXED HYPERLIPIDEMIA: ICD-10-CM

## 2025-05-26 DIAGNOSIS — F32.A DEPRESSIVE DISORDER: Chronic | ICD-10-CM

## 2025-05-26 DIAGNOSIS — I10 BENIGN ESSENTIAL HYPERTENSION: Chronic | ICD-10-CM

## 2025-05-26 DIAGNOSIS — B35.1 ONYCHOMYCOSIS: ICD-10-CM

## 2025-05-26 DIAGNOSIS — E11.9 TYPE 2 DIABETES MELLITUS WITHOUT COMPLICATION, WITHOUT LONG-TERM CURRENT USE OF INSULIN: Primary | ICD-10-CM

## 2025-05-26 PROBLEM — R19.5 POSITIVE FIT (FECAL IMMUNOCHEMICAL TEST): Status: RESOLVED | Noted: 2025-05-19 | Resolved: 2025-05-26

## 2025-05-26 PROCEDURE — 3074F SYST BP LT 130 MM HG: CPT | Mod: CPTII,,,

## 2025-05-26 PROCEDURE — G2211 COMPLEX E/M VISIT ADD ON: HCPCS | Mod: S$PBB,,,

## 2025-05-26 PROCEDURE — 99214 OFFICE O/P EST MOD 30 MIN: CPT | Mod: S$PBB,,,

## 2025-05-26 PROCEDURE — 1160F RVW MEDS BY RX/DR IN RCRD: CPT | Mod: CPTII,,,

## 2025-05-26 PROCEDURE — 4010F ACE/ARB THERAPY RXD/TAKEN: CPT | Mod: CPTII,,,

## 2025-05-26 PROCEDURE — 3078F DIAST BP <80 MM HG: CPT | Mod: CPTII,,,

## 2025-05-26 PROCEDURE — 3008F BODY MASS INDEX DOCD: CPT | Mod: CPTII,,,

## 2025-05-26 PROCEDURE — 99214 OFFICE O/P EST MOD 30 MIN: CPT | Mod: PBBFAC

## 2025-05-26 PROCEDURE — 3044F HG A1C LEVEL LT 7.0%: CPT | Mod: CPTII,,,

## 2025-05-26 PROCEDURE — 1159F MED LIST DOCD IN RCRD: CPT | Mod: CPTII,,,

## 2025-05-26 RX ORDER — TERBINAFINE HYDROCHLORIDE 250 MG/1
250 TABLET ORAL DAILY
Qty: 90 TABLET | Refills: 0 | Status: SHIPPED | OUTPATIENT
Start: 2025-05-26 | End: 2025-08-24

## 2025-05-26 RX ORDER — FINASTERIDE 5 MG/1
5 TABLET, FILM COATED ORAL DAILY
Qty: 90 TABLET | Refills: 2 | Status: SHIPPED | OUTPATIENT
Start: 2025-05-26 | End: 2026-02-20

## 2025-05-26 RX ORDER — GLIMEPIRIDE 4 MG/1
4 TABLET ORAL 2 TIMES DAILY WITH MEALS
Qty: 180 TABLET | Refills: 2 | Status: SHIPPED | OUTPATIENT
Start: 2025-05-26 | End: 2026-02-20

## 2025-05-26 RX ORDER — IBUPROFEN 800 MG/1
800 TABLET, FILM COATED ORAL EVERY 8 HOURS PRN
Qty: 90 TABLET | Refills: 2 | Status: SHIPPED | OUTPATIENT
Start: 2025-05-26 | End: 2026-02-20

## 2025-05-26 RX ORDER — METOPROLOL SUCCINATE 50 MG/1
50 TABLET, EXTENDED RELEASE ORAL DAILY
Qty: 90 TABLET | Refills: 2 | Status: SHIPPED | OUTPATIENT
Start: 2025-05-26 | End: 2026-02-20

## 2025-05-26 RX ORDER — SITAGLIPTIN 100 MG/1
100 TABLET, FILM COATED ORAL DAILY
Qty: 90 TABLET | Refills: 2 | Status: SHIPPED | OUTPATIENT
Start: 2025-05-26 | End: 2026-02-20

## 2025-05-26 RX ORDER — METFORMIN HYDROCHLORIDE 1000 MG/1
1000 TABLET ORAL 2 TIMES DAILY WITH MEALS
Qty: 180 TABLET | Refills: 2 | Status: SHIPPED | OUTPATIENT
Start: 2025-05-26 | End: 2026-02-20

## 2025-05-26 RX ORDER — FLUOXETINE 20 MG/1
20 CAPSULE ORAL DAILY
Qty: 90 CAPSULE | Refills: 2 | Status: SHIPPED | OUTPATIENT
Start: 2025-05-26 | End: 2026-02-20

## 2025-05-26 NOTE — ASSESSMENT & PLAN NOTE
Rx terbinafine 250 mg po daily x 12 weeks.  Come to lab in 6 weeks for LFT, will contact with results  Dry feet, in between toes and toenails thoroughly after bathing.  Dry feet and change socks frequently with excessive sweating.  Apply vapor rub to toenails BID and apply socks x 4-6 months.  Clip and trim loose nails frequently.

## 2025-05-26 NOTE — ASSESSMENT & PLAN NOTE
Cardiology managing.  At goal  Continue fenofibrate, zetia, atorvastatin  Follow a low cholesterol, low saturated fat diet with less than 200 mg of cholesterol a day.   Avoid fried foods and high saturated fats (de jesus, sausage, cookies, cakes, chips, cheese, whole milk, butter, mayonnaise, creamy dressings, gravy and cream sauces).  Add flax seed or fish oil supplements to diet.   Increase dietary fiber.   Regular exercise improves cholesterol levels.  Physical activity 5 times a week for 30 minutes per day (or 150 minutes per week).   Stressed importance of dietary modifications.

## 2025-05-26 NOTE — ASSESSMENT & PLAN NOTE
A1C 5.7- at goal  Continue glipizide, metformin and januvia  Continue medications as prescribed.  Follow ADA diet.  Avoid soda, simple sweets, and limit rice/pasta/bread/starches and consume brown options when possible.   Maintain healthy weight with BMI goal <30.   Perform aerobic exercise for 150 minutes per week (or 5 days a week for 30 minutes each day).   Examine feet daily.   Obtain annual dilated eye exam.    Prescription called into the [facility] at [phone] as noted and approved by Dr. Rangel

## 2025-05-26 NOTE — ASSESSMENT & PLAN NOTE
Refilled ibuprofen for PRN use  Perform stretches daily  Exercise as tolerated (low impact)  Avoid activities that increase the pain or cause stiffness.  Apply heating pad, ice pack, OTC topical as needed.  Massage to loosen muscles.  Continues NSAID as needed  F/U if worsening

## 2025-05-26 NOTE — PROGRESS NOTES
Ria Galeas, CECILE   [facility]  [address] [address]      PATIENT NAME: Mynor Recio  : 1970  DATE: 25  MRN: [id number]      Reason for Visit / Chief Complaint: Follow-up (Labs completed. Had colonoscopy last week )       History of Present Illness / Problem Focused Workflow     Mynor Recio presents to the clinic with Follow-up (Labs completed. Had colonoscopy last week )     55 y.o. male presents to the clinic. Medical problems include diabetes, hypertension, HLD, HFrEF (LVEF 30-35%; 2019), and s/p PCI with stent x2 (OM1 and mid circumflex; 2019), ventral hernia, morbid obesity, depression, mild DDD lumbar, and tobacco abuse. Followed by [facility] cardio clinic.      09/10/24  Pt presents to clinic for DM follow up. Pt did not complete his labs prior to the apt. Will go today and RTC for lab review in 2-3 weeks. BP is at goal today and pt has lost approx 20 lbs since last visit with diet efforts. He declines vaccines today. Medications reviewed and refilled appropriately. DM foot and eye exam performed today. He is agreeable to FIT test for colorectal cancer screening.     10/15/24  Pt presents for DM lab review, A1C at goal at 5.6. pt labs otherwise unremarkable. FIT test was positive, informed pt and referral placed for colonoscopy. Denies acte complaints, declines vaccines.     25  Pt presents for wellness. Labs with stable anemia, A1C and lipids at goal. Overall unremarkable. Colonoscopy completed, polyps removed and 3 year follow up recommended. He is reporting fungal toenail infection for years, he has tried topical treatment in the past which was unsuccessful. He would like to move forward with oral tx, reviewed no alcohol use during treatment. Come to lab in 6 weeks for LFT monitoring, will have staff contact with results. He otherwise does not have any acute complaints. He is compliant  with medication, refills provided. RTC 6 months for routine HTN, DM follow up with POC A1C          Review of Systems     Review of Systems   Constitutional:  Negative for fatigue, fever and unexpected weight change.   HENT:  Negative for ear pain, hearing loss, trouble swallowing and voice change.    Respiratory:  Negative for cough and shortness of breath.    Cardiovascular:  Negative for chest pain and palpitations.   Gastrointestinal:  Negative for abdominal pain, diarrhea and vomiting.   Genitourinary:  Negative for dysuria.   Musculoskeletal:  Negative for gait problem.   Skin:  Negative for rash and wound.   Neurological:  Negative for weakness.   Psychiatric/Behavioral:  Negative for suicidal ideas.          Medications and Allergies     Medications  Medication List with Changes/Refills   New Medications    TERBINAFINE HCL (LAMISIL) 250 MG TABLET    Take 1 tablet (250 mg total) by mouth once daily.   Current Medications    ASPIRIN (ECOTRIN) 81 MG EC TABLET    Take 1 tablet (81 mg total) by mouth once daily.    ATORVASTATIN (LIPITOR) 80 MG TABLET    Take 1 tablet (80 mg total) by mouth once daily.    BLOOD PRESSURE TEST KIT-LARGE KIT    1 Device by Misc.(Non-Drug; Combo Route) route as needed.    EMPAGLIFLOZIN (JARDIANCE) 10 MG TABLET    Take 1 tablet (10 mg total) by mouth once daily.    EZETIMIBE (ZETIA) 10 MG TABLET    Take 1 tablet (10 mg total) by mouth once daily.    FENOFIBRATE (TRICOR) 145 MG TABLET    Take 1 tablet (145 mg total) by mouth once daily.    SACUBITRIL-VALSARTAN (ENTRESTO)  MG PER TABLET    Take 1 tablet by mouth 2 (two) times daily.   Changed and/or Refilled Medications    Modified Medication Previous Medication    FINASTERIDE (PROSCAR) 5 MG TABLET finasteride (PROSCAR) 5 mg tablet       Take 1 tablet (5 mg total) by mouth once daily.    Take 1 tablet (5 mg total) by mouth once daily.    FLUOXETINE 20 MG CAPSULE FLUoxetine 20 MG capsule       Take 1 capsule (20 mg total) by mouth  once daily.    Take 1 capsule (20 mg total) by mouth once daily.    GLIMEPIRIDE (AMARYL) 4 MG TABLET glimepiride (AMARYL) 4 MG tablet       Take 1 tablet (4 mg total) by mouth 2 (two) times daily with meals.    Take 1 tablet (4 mg total) by mouth 2 (two) times daily with meals.    IBUPROFEN (ADVIL,MOTRIN) 800 MG TABLET ibuprofen (ADVIL,MOTRIN) 800 MG tablet       Take 1 tablet (800 mg total) by mouth every 8 (eight) hours as needed for Pain.    Take 1 tablet (800 mg total) by mouth every 8 (eight) hours as needed for Pain.    JANUVIA 100 MG TAB JANUVIA 100 mg Tab       Take 1 tablet (100 mg total) by mouth once daily.    Take 1 tablet (100 mg total) by mouth once daily.    METFORMIN (GLUCOPHAGE) 1000 MG TABLET metFORMIN (GLUCOPHAGE) 1000 MG tablet       Take 1 tablet (1,000 mg total) by mouth 2 (two) times daily with meals.    Take 1 tablet (1,000 mg total) by mouth 2 (two) times daily with meals.    METOPROLOL SUCCINATE (TOPROL-XL) 50 MG 24 HR TABLET metoprolol succinate (TOPROL-XL) 50 MG 24 hr tablet       Take 1 tablet (50 mg total) by mouth once daily.    Take 1 tablet (50 mg total) by mouth once daily.   Discontinued Medications    LOSARTAN (COZAAR) 25 MG TABLET    Take 1 tablet (25 mg total) by mouth once daily.         Allergies  Review of patient's allergies indicates:  No Known Allergies    Physical Examination     Vitals:    05/26/25 1259   BP: 109/63   Pulse: 76   Resp: 16   Temp: 98.2 °F (36.8 °C)     Physical Exam  Vitals and nursing note reviewed.   Constitutional:       General: He is not in acute distress.     Appearance: He is not ill-appearing.   HENT:      Head: Normocephalic and atraumatic.      Mouth/Throat:      Mouth: Mucous membranes are moist.      Pharynx: Oropharynx is clear.   Eyes:      General: No scleral icterus.     Extraocular Movements: Extraocular movements intact.      Conjunctiva/sclera: Conjunctivae normal.      Pupils: Pupils are equal, round, and reactive to light.   Neck:       Vascular: No carotid bruit.   Cardiovascular:      Rate and Rhythm: Normal rate and regular rhythm.      Heart sounds: No murmur heard.     No friction rub. No gallop.   Pulmonary:      Effort: Pulmonary effort is normal. No respiratory distress.      Breath sounds: Normal breath sounds. No wheezing, rhonchi or rales.   Abdominal:      General: Abdomen is flat. Bowel sounds are normal. There is no distension.      Palpations: Abdomen is soft. There is no mass.      Tenderness: There is no abdominal tenderness.   Musculoskeletal:         General: Normal range of motion.      Cervical back: Normal range of motion and neck supple.   Skin:     General: Skin is warm and dry.   Neurological:      General: No focal deficit present.      Mental Status: He is alert.   Psychiatric:         Mood and Affect: Mood normal.           Results     Lab Results   Component Value Date    WBC 8.57 04/15/2025    RBC 4.06 (L) 04/15/2025    HGB 13.3 (L) 04/15/2025    HCT 38.9 (L) 04/15/2025    MCV 95.8 (H) 04/15/2025    MCH 32.8 (H) 04/15/2025    MCHC 34.2 04/15/2025    RDW 12.4 04/15/2025     04/15/2025    MPV 10.7 (H) 04/15/2025     Sodium   Date Value Ref Range Status   05/12/2025 141 136 - 145 mmol/L Final     Potassium   Date Value Ref Range Status   05/12/2025 3.7 3.5 - 5.1 mmol/L Final     Chloride   Date Value Ref Range Status   05/12/2025 108 (H) 98 - 107 mmol/L Final     CO2   Date Value Ref Range Status   05/12/2025 25 22 - 29 mmol/L Final     Glucose   Date Value Ref Range Status   05/12/2025 135 (H) 74 - 100 mg/dL Final     Blood Urea Nitrogen   Date Value Ref Range Status   05/12/2025 24.8 8.4 - 25.7 mg/dL Final     Creatinine   Date Value Ref Range Status   05/12/2025 0.87 0.72 - 1.25 mg/dL Final     Calcium   Date Value Ref Range Status   05/12/2025 9.5 8.4 - 10.2 mg/dL Final     Protein Total   Date Value Ref Range Status   04/15/2025 6.8 6.4 - 8.3 gm/dL Final     Albumin   Date Value Ref Range Status   04/15/2025  3.7 3.5 - 5.0 g/dL Final     Bilirubin Total   Date Value Ref Range Status   04/15/2025 0.4 <=1.5 mg/dL Final     ALP   Date Value Ref Range Status   04/15/2025 48 40 - 150 unit/L Final     AST   Date Value Ref Range Status   04/15/2025 18 11 - 45 unit/L Final     ALT   Date Value Ref Range Status   04/15/2025 22 0 - 55 unit/L Final     Estimated GFR-Non    Date Value Ref Range Status   04/12/2022 72 >=90      Lab Results   Component Value Date    CHOL 118 04/15/2025     Lab Results   Component Value Date    HDL 43 04/15/2025     Lab Results   Component Value Date    TRIG 80 04/15/2025     Lab Results   Component Value Date    VLDL 16 04/15/2025     Lab Results   Component Value Date    LDL 59.00 04/15/2025     Lab Results   Component Value Date    TSH 1.071 04/15/2025     Lab Results   Component Value Date    PHUR 5.5 04/15/2025    PROTEINUA Trace (A) 04/15/2025    GLUCUA 3+ (A) 04/15/2025    KETONESU Negative 03/03/2022    OCCULTUA Negative 04/15/2025    NITRITE Negative 04/15/2025    LEUKOCYTESUR Negative 04/15/2025     Lab Results   Component Value Date    HGBA1C 5.7 04/15/2025    HGBA1C 5.6 09/10/2024    HGBA1C 6.7 08/15/2023           Assessment         ICD-10-CM ICD-9-CM   1. Type 2 diabetes mellitus without complication, without long-term current use of insulin  E11.9 250.00   2. Chronic combined systolic and diastolic heart failure  I50.42 428.42   3. Hip pain, right  M25.551 719.45   4. Onychomycosis  B35.1 110.1   5. Mixed hyperlipidemia  E78.2 272.2   6. Depressive disorder  F32.A 311   7. Benign essential hypertension  I10 401.1       Plan      Problem List Items Addressed This Visit       Benign essential hypertension (Chronic)    Current Assessment & Plan   Continue entresto.  Keep cardio appts/diagnostics as scheduled.  Follow a low sodium (less than 2 grams of sodium per day), DASH diet.   Continue medications as prescribed.  Monitor blood pressure and report any consistent values  greater than 140/90 and keep a log.  Maintain healthy weight with a BMI goal of <30.   Aerobic exercise for 150 minutes per week (or 5 days a week for 30 minutes each day).         Chronic combined systolic and diastolic heart failure    Current Assessment & Plan   Continue entresto.   Keep Cardiology Clinic appts as scheduled.  Notify the clinic if you gain 3 or more pounds in one day or 5 or more pounds in one week.  Stressed importance of daily morning weights after urination but prior to breakfast on the same scale.  Low Sodium Diet (Dash Diet - less than 2 grams of sodium per day).  Follow a low cholesterol, low saturated fat diet with less that 200mg of cholesterol a day.  Cut down on alcohol if you have more than 1 drink a day (for women) or 2 drinks a day (for men).  Maintain healthy weight with goal BMI <30. Perform 30 minutes of daily physical activity 5 days per week.  Report to ER for chest pain, SOB, difficulty breathing, abdominal distention or significant edema to lower extremities.            Relevant Medications    finasteride (PROSCAR) 5 mg tablet    metoprolol succinate (TOPROL-XL) 50 MG 24 hr tablet    Depressive disorder (Chronic)    Current Assessment & Plan   Continue fluoxetine.  Denies SI/HI.  Read positive daily meditations, avoid negative media, set healthy boundaries.  Exercise daily, keep consistent sleep pattern, eat a healthy diet.  Establish good social support, make changes to reduce stress.  Do not drink alcohol or use illicit drugs.  Reports any symptoms of suicidal/homicidal ideations or self harm immediately, go to nearest emergency room.          Relevant Medications    FLUoxetine 20 MG capsule    Mixed hyperlipidemia    Current Assessment & Plan   Cardiology managing.  At goal  Continue fenofibrate, zetia, atorvastatin  Follow a low cholesterol, low saturated fat diet with less than 200 mg of cholesterol a day.   Avoid fried foods and high saturated fats (de jesus, sausage,  cookies, cakes, chips, cheese, whole milk, butter, mayonnaise, creamy dressings, gravy and cream sauces).  Add flax seed or fish oil supplements to diet.   Increase dietary fiber.   Regular exercise improves cholesterol levels.  Physical activity 5 times a week for 30 minutes per day (or 150 minutes per week).   Stressed importance of dietary modifications.          Type 2 diabetes mellitus without complication, without long-term current use of insulin - Primary    Current Assessment & Plan   A1C 5.7- at goal  Continue glipizide, metformin and januvia  Continue medications as prescribed.  Follow ADA diet.  Avoid soda, simple sweets, and limit rice/pasta/bread/starches and consume brown options when possible.   Maintain healthy weight with BMI goal <30.   Perform aerobic exercise for 150 minutes per week (or 5 days a week for 30 minutes each day).   Examine feet daily.   Obtain annual dilated eye exam.          Relevant Medications    glimepiride (AMARYL) 4 MG tablet    metFORMIN (GLUCOPHAGE) 1000 MG tablet    JANUVIA 100 mg Tab    Onychomycosis    Current Assessment & Plan   Rx terbinafine 250 mg po daily x 12 weeks.  Come to lab in 6 weeks for LFT, will contact with results  Dry feet, in between toes and toenails thoroughly after bathing.  Dry feet and change socks frequently with excessive sweating.  Apply vapor rub to toenails BID and apply socks x 4-6 months.  Clip and trim loose nails frequently.           Relevant Medications    terbinafine HCL (LAMISIL) 250 mg tablet    Other Relevant Orders    Comprehensive Metabolic Panel    Hip pain, right    Current Assessment & Plan   Refilled ibuprofen for PRN use  Perform stretches daily  Exercise as tolerated (low impact)  Avoid activities that increase the pain or cause stiffness.  Apply heating pad, ice pack, OTC topical as needed.  Massage to loosen muscles.  Continues NSAID as needed  F/U if worsening            Relevant Medications    ibuprofen (ADVIL,MOTRIN) 800  MG tablet       Future Appointments   Date Time Provider Department Center   8/19/2025  1:00 PM Raghavendra Delgado MD [facility] [place]   12/1/2025 12:00 PM Ria Galeas FNP [facility] [place] [place]            Signature:      [facility]  [address] [address]    Date of encounter: 5/26/25

## 2025-06-05 DIAGNOSIS — I50.42 CHRONIC COMBINED SYSTOLIC AND DIASTOLIC HEART FAILURE: ICD-10-CM

## 2025-06-05 RX ORDER — ATORVASTATIN CALCIUM 80 MG/1
80 TABLET, FILM COATED ORAL DAILY
Qty: 90 TABLET | Refills: 3 | Status: SHIPPED | OUTPATIENT
Start: 2025-06-05

## 2025-07-02 DIAGNOSIS — E78.2 MIXED HYPERLIPIDEMIA: ICD-10-CM

## 2025-07-02 RX ORDER — FENOFIBRATE 145 MG/1
145 TABLET, FILM COATED ORAL DAILY
Qty: 180 TABLET | Refills: 3 | Status: SHIPPED | OUTPATIENT
Start: 2025-07-02

## (undated) DEVICE — KIT SURGICAL COLON .25 1.1OZ

## (undated) DEVICE — SNARE EXACTO COLD

## (undated) DEVICE — TRAPEASE POLYP SINGLE 29-750

## (undated) DEVICE — MANIFOLD 4 PORT

## (undated) DEVICE — DEFOAMER WATER SOLUBLE ENDO